# Patient Record
Sex: MALE | Race: WHITE | NOT HISPANIC OR LATINO | Employment: OTHER | URBAN - METROPOLITAN AREA
[De-identification: names, ages, dates, MRNs, and addresses within clinical notes are randomized per-mention and may not be internally consistent; named-entity substitution may affect disease eponyms.]

---

## 2023-05-05 LAB
ALANINE AMINOTRANSFERASE (SGPT) (U/L) IN SER/PLAS: 15 U/L (ref 10–52)
ALBUMIN (G/DL) IN SER/PLAS: 4.5 G/DL (ref 3.4–5)
ALKALINE PHOSPHATASE (U/L) IN SER/PLAS: 58 U/L (ref 33–136)
ANION GAP IN SER/PLAS: 12 MMOL/L (ref 10–20)
ASPARTATE AMINOTRANSFERASE (SGOT) (U/L) IN SER/PLAS: 15 U/L (ref 9–39)
BILIRUBIN TOTAL (MG/DL) IN SER/PLAS: 0.9 MG/DL (ref 0–1.2)
CALCIUM (MG/DL) IN SER/PLAS: 9.5 MG/DL (ref 8.6–10.3)
CARBON DIOXIDE, TOTAL (MMOL/L) IN SER/PLAS: 32 MMOL/L (ref 21–32)
CHLORIDE (MMOL/L) IN SER/PLAS: 102 MMOL/L (ref 98–107)
CREATININE (MG/DL) IN SER/PLAS: 0.96 MG/DL (ref 0.5–1.3)
GFR MALE: 84 ML/MIN/1.73M2
GLUCOSE (MG/DL) IN SER/PLAS: 153 MG/DL (ref 74–99)
POTASSIUM (MMOL/L) IN SER/PLAS: 3.9 MMOL/L (ref 3.5–5.3)
PROSTATE SPECIFIC AG (NG/ML) IN SER/PLAS: 0.35 NG/ML (ref 0–4)
PROTEIN TOTAL: 6.9 G/DL (ref 6.4–8.2)
SEDIMENTATION RATE, ERYTHROCYTE: 11 MM/H (ref 0–20)
SODIUM (MMOL/L) IN SER/PLAS: 142 MMOL/L (ref 136–145)
UREA NITROGEN (MG/DL) IN SER/PLAS: 13 MG/DL (ref 6–23)

## 2023-05-11 ENCOUNTER — OFFICE VISIT (OUTPATIENT)
Dept: PRIMARY CARE | Facility: CLINIC | Age: 73
End: 2023-05-11
Payer: MEDICARE

## 2023-05-11 VITALS
SYSTOLIC BLOOD PRESSURE: 126 MMHG | HEIGHT: 69 IN | HEART RATE: 65 BPM | BODY MASS INDEX: 22.96 KG/M2 | TEMPERATURE: 98.7 F | WEIGHT: 155 LBS | RESPIRATION RATE: 14 BRPM | OXYGEN SATURATION: 96 % | DIASTOLIC BLOOD PRESSURE: 60 MMHG

## 2023-05-11 DIAGNOSIS — I20.9 ANGINA PECTORIS (CMS-HCC): ICD-10-CM

## 2023-05-11 DIAGNOSIS — J44.9 CHRONIC OBSTRUCTIVE PULMONARY DISEASE, UNSPECIFIED COPD TYPE (MULTI): ICD-10-CM

## 2023-05-11 DIAGNOSIS — C61 PROSTATE CANCER (MULTI): Primary | ICD-10-CM

## 2023-05-11 DIAGNOSIS — R73.9 ELEVATED BLOOD SUGAR: ICD-10-CM

## 2023-05-11 DIAGNOSIS — I25.10 CORONARY ARTERY DISEASE INVOLVING NATIVE CORONARY ARTERY OF NATIVE HEART WITHOUT ANGINA PECTORIS: ICD-10-CM

## 2023-05-11 PROCEDURE — 1160F RVW MEDS BY RX/DR IN RCRD: CPT | Performed by: INTERNAL MEDICINE

## 2023-05-11 PROCEDURE — 1159F MED LIST DOCD IN RCRD: CPT | Performed by: INTERNAL MEDICINE

## 2023-05-11 PROCEDURE — 99213 OFFICE O/P EST LOW 20 MIN: CPT | Performed by: INTERNAL MEDICINE

## 2023-05-11 RX ORDER — ALBUTEROL SULFATE 90 UG/1
AEROSOL, METERED RESPIRATORY (INHALATION)
COMMUNITY
End: 2023-11-09 | Stop reason: SDUPTHER

## 2023-05-11 RX ORDER — RIVAROXABAN 20 MG/1
20 TABLET, FILM COATED ORAL DAILY
COMMUNITY
Start: 2023-05-08 | End: 2023-10-10 | Stop reason: ALTCHOICE

## 2023-05-11 RX ORDER — ROSUVASTATIN CALCIUM 40 MG/1
40 TABLET, COATED ORAL DAILY
COMMUNITY
End: 2023-05-11 | Stop reason: ALTCHOICE

## 2023-05-11 RX ORDER — BUPROPION HYDROCHLORIDE 150 MG/1
150 TABLET ORAL DAILY
COMMUNITY
Start: 2022-05-16 | End: 2023-05-11 | Stop reason: ALTCHOICE

## 2023-05-11 RX ORDER — BUDESONIDE 0.5 MG/2ML
0.5 INHALANT ORAL
COMMUNITY
Start: 2023-05-05 | End: 2024-05-04 | Stop reason: ENTERED-IN-ERROR

## 2023-05-11 RX ORDER — ASPIRIN 81 MG/1
1 TABLET ORAL DAILY
COMMUNITY
End: 2023-05-11 | Stop reason: ALTCHOICE

## 2023-05-11 RX ORDER — APIXABAN 5 MG/1
5 TABLET, FILM COATED ORAL 2 TIMES DAILY
COMMUNITY
Start: 2022-12-16 | End: 2023-05-11 | Stop reason: ALTCHOICE

## 2023-05-11 RX ORDER — NEBIVOLOL 2.5 MG/1
2.5 TABLET ORAL DAILY
COMMUNITY
Start: 2023-05-08 | End: 2023-11-09 | Stop reason: SDUPTHER

## 2023-05-11 RX ORDER — ACETAMINOPHEN 500 MG
TABLET ORAL
COMMUNITY
End: 2023-11-09 | Stop reason: ALTCHOICE

## 2023-05-11 RX ORDER — IPRATROPIUM BROMIDE AND ALBUTEROL SULFATE 2.5; .5 MG/3ML; MG/3ML
3 SOLUTION RESPIRATORY (INHALATION) 4 TIMES DAILY PRN
COMMUNITY
Start: 2023-05-05

## 2023-05-11 RX ORDER — TAMSULOSIN HYDROCHLORIDE 0.4 MG/1
1 CAPSULE ORAL DAILY
COMMUNITY
End: 2023-11-09 | Stop reason: SDUPTHER

## 2023-05-11 RX ORDER — TADALAFIL 5 MG/1
1 TABLET ORAL DAILY
COMMUNITY
Start: 2020-06-13 | End: 2023-05-11 | Stop reason: ALTCHOICE

## 2023-05-11 RX ORDER — PRAVASTATIN SODIUM 10 MG/1
10 TABLET ORAL DAILY
Qty: 90 TABLET | Refills: 3 | Status: SHIPPED | OUTPATIENT
Start: 2023-05-11 | End: 2023-11-09 | Stop reason: ALTCHOICE

## 2023-05-11 ASSESSMENT — ENCOUNTER SYMPTOMS
PALPITATIONS: 0
DIARRHEA: 0
WHEEZING: 1
SINUS PAIN: 0
SHORTNESS OF BREATH: 1
HYPERTENSION: 1
HEMATURIA: 1
COUGH: 1
CONSTIPATION: 0
ABDOMINAL PAIN: 0

## 2023-05-11 NOTE — PROGRESS NOTES
"Subjective   Patient ID: Raheel Cloud is a 72 y.o. male who presents for Hypertension (Follow up hypertension.).    Hypertension  Associated symptoms include shortness of breath. Pertinent negatives include no chest pain or palpitations.        Review of Systems   HENT:  Negative for postnasal drip and sinus pain.    Respiratory:  Positive for cough, shortness of breath and wheezing.    Cardiovascular:  Negative for chest pain and palpitations.   Gastrointestinal:  Negative for abdominal pain, constipation and diarrhea.   Genitourinary:  Positive for hematuria.        Prostate cancer       Objective   /60 (BP Location: Left arm, Patient Position: Standing, BP Cuff Size: Adult)   Pulse 65   Temp 37.1 °C (98.7 °F) (Tympanic)   Resp 14   Ht 1.753 m (5' 9\")   Wt 70.3 kg (155 lb)   SpO2 96%   BMI 22.89 kg/m²     Physical Exam  Vitals reviewed.   Constitutional:       Appearance: Normal appearance.   HENT:      Head: Normocephalic.   Cardiovascular:      Rate and Rhythm: Normal rate.   Pulmonary:      Effort: Pulmonary effort is normal.   Musculoskeletal:         General: Normal range of motion.   Neurological:      General: No focal deficit present.      Mental Status: He is alert.   Psychiatric:         Mood and Affect: Mood normal.         Assessment/Plan   Problem List Items Addressed This Visit          Circulatory    Angina pectoris (CMS/HCC)    Relevant Medications    nebivolol (Bystolic) 2.5 mg tablet       Genitourinary    Prostate cancer (CMS/HCC) - Primary     Other Visit Diagnoses       Chronic obstructive pulmonary disease, unspecified COPD type (CMS/HCC)        Elevated blood sugar        Relevant Orders    Basic Metabolic Panel    Hemoglobin A1C    Coronary artery disease involving native coronary artery of native heart without angina pectoris        Relevant Medications    nebivolol (Bystolic) 2.5 mg tablet    pravastatin (Pravachol) 10 mg tablet        Discussed all of the above.    He " states he had to stop the crestor due to muscle pains and weakness.  He is agreeable to trying pravastatin.    COPD has been progressive.  He continues to smoke.  He does follow with pulmonology.  Reviewed recent labs.  PSA trending down.    Follow up in 6 months - sooner if any issues.

## 2023-05-16 DIAGNOSIS — U07.1 COVID-19: Primary | ICD-10-CM

## 2023-05-20 DIAGNOSIS — J43.9 PULMONARY EMPHYSEMA, UNSPECIFIED EMPHYSEMA TYPE (MULTI): Primary | ICD-10-CM

## 2023-05-20 RX ORDER — PREDNISONE 10 MG/1
TABLET ORAL
Qty: 30 TABLET | Refills: 0 | Status: SHIPPED | OUTPATIENT
Start: 2023-05-20 | End: 2023-05-30

## 2023-05-22 ENCOUNTER — PATIENT OUTREACH (OUTPATIENT)
Dept: PRIMARY CARE | Facility: CLINIC | Age: 73
End: 2023-05-22
Payer: MEDICARE

## 2023-05-22 NOTE — PROGRESS NOTES
Discharge Facility: [facility]  Discharge Diagnosis: Hypoxia, COPD exacerbation, COVID  Admission Date:5/20/2023  Discharge Date: 5/21/2023    PCP Appointment Date:6/1/2023  Specialist Appointment Date: NONE  Hospital Encounter and Summary: Linked   See discharge assessment below for further details    Engagement  Call Start Time: 1148 (5/22/2023 11:48 AM)    Medications  Medications reviewed with patient/caregiver?: Yes (5/22/2023 11:48 AM)  Is the patient having any side effects they believe may be caused by any medication additions or changes?: No (5/22/2023 11:48 AM)  Does the patient have all medications ordered at discharge?: Yes (new meds: prednisone) (5/22/2023 11:48 AM)  Care Management Interventions: No intervention needed (5/22/2023 11:48 AM)  Is the patient taking all medications as directed (includes completed medication regime)?: Yes (5/22/2023 11:48 AM)  Medication Comments: see med list (5/22/2023 11:48 AM)    Appointments  Does the patient have a primary care provider?: Yes (5/22/2023 11:48 AM)  Care Management Interventions: Verified appointment date/time/provider (5/22/2023 11:48 AM)  Has the patient kept scheduled appointments due by today?: Yes (5/22/2023 11:48 AM)  Care Management Interventions: Advised patient to keep appointment (5/22/2023 11:48 AM)    Self Management  What is the home health agency?: none ordered (5/22/2023 11:48 AM)  Has home health visited the patient within 72 hours of discharge?: Not applicable (5/22/2023 11:48 AM)    Patient Teaching  Does the patient have access to their discharge instructions?: Yes (5/22/2023 11:48 AM)  Care Management Interventions: Reviewed instructions with patient (5/22/2023 11:48 AM)  What is the patient's perception of their health status since discharge?: Same (5/22/2023 11:48 AM)  Is the patient/caregiver able to teach back the hierarchy of who to call/visit for symptoms/problems? PCP, Specialist, Home Health nurse, Urgent Care, ED, 911: Yes  (5/22/2023 11:48 AM)    Wrap Up  Wrap Up Additional Comments: spoke with patient. he stated that he is doing about the same. he is checking his oxygen and is staying around 88-89%. has o2 at home. using as needed. 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 has follow up appt with pcp. (5/22/2023 11:48 AM)  Call End Time: 1151 (5/22/2023 11:48 AM)

## 2023-05-31 ENCOUNTER — TELEPHONE (OUTPATIENT)
Dept: PRIMARY CARE | Facility: CLINIC | Age: 73
End: 2023-05-31
Payer: MEDICARE

## 2023-06-01 ENCOUNTER — APPOINTMENT (OUTPATIENT)
Dept: PRIMARY CARE | Facility: CLINIC | Age: 73
End: 2023-06-01
Payer: MEDICARE

## 2023-06-01 PROBLEM — K42.9 UMBILICAL HERNIA WITHOUT OBSTRUCTION AND WITHOUT GANGRENE: Status: ACTIVE | Noted: 2023-06-01

## 2023-06-01 PROBLEM — R09.02 HYPOXIA: Status: ACTIVE | Noted: 2023-06-01

## 2023-06-01 PROBLEM — I49.3 FREQUENT PVCS: Status: ACTIVE | Noted: 2023-06-01

## 2023-06-01 PROBLEM — F17.200 SMOKER: Status: ACTIVE | Noted: 2023-06-01

## 2023-06-01 PROBLEM — N40.0 BPH (BENIGN PROSTATIC HYPERPLASIA): Status: ACTIVE | Noted: 2023-06-01

## 2023-06-01 PROBLEM — I49.1 PAC (PREMATURE ATRIAL CONTRACTION): Status: ACTIVE | Noted: 2023-06-01

## 2023-06-01 PROBLEM — E78.5 HLD (HYPERLIPIDEMIA): Status: ACTIVE | Noted: 2023-06-01

## 2023-06-06 ENCOUNTER — PATIENT OUTREACH (OUTPATIENT)
Dept: PRIMARY CARE | Facility: CLINIC | Age: 73
End: 2023-06-06
Payer: MEDICARE

## 2023-06-06 NOTE — PROGRESS NOTES
Unable to reach patient for call back after patient's scheduled follow up appointment with PCP. Patients appt was cancelled.   LVM with call back number for patient to call if needed   If no voicemail available call attempts x 2 were made to contact the patient to assist with any questions or concerns patient may have.

## 2023-07-06 ENCOUNTER — PATIENT OUTREACH (OUTPATIENT)
Dept: PRIMARY CARE | Facility: CLINIC | Age: 73
End: 2023-07-06
Payer: MEDICARE

## 2023-08-11 LAB — PROSTATE SPECIFIC AG (NG/ML) IN SER/PLAS: 0.43 NG/ML (ref 0–4)

## 2023-08-21 ENCOUNTER — PATIENT OUTREACH (OUTPATIENT)
Dept: PRIMARY CARE | Facility: CLINIC | Age: 73
End: 2023-08-21
Payer: MEDICARE

## 2023-09-29 ENCOUNTER — TRANSCRIBE ORDERS (OUTPATIENT)
Dept: CARDIAC REHAB | Facility: CLINIC | Age: 73
End: 2023-09-29
Payer: MEDICARE

## 2023-09-29 DIAGNOSIS — J44.9 CHRONIC OBSTRUCTIVE PULMONARY DISEASE, UNSPECIFIED COPD TYPE (MULTI): Primary | ICD-10-CM

## 2023-10-02 ENCOUNTER — CLINICAL SUPPORT (OUTPATIENT)
Dept: CARDIAC REHAB | Facility: CLINIC | Age: 73
End: 2023-10-02
Payer: MEDICARE

## 2023-10-02 DIAGNOSIS — J44.9 CHRONIC OBSTRUCTIVE PULMONARY DISEASE, UNSPECIFIED COPD TYPE (MULTI): ICD-10-CM

## 2023-10-02 PROCEDURE — 94625 PHY/QHP OP PULM RHB W/O MNTR: CPT | Performed by: INTERNAL MEDICINE

## 2023-10-04 ENCOUNTER — CLINICAL SUPPORT (OUTPATIENT)
Dept: CARDIAC REHAB | Facility: CLINIC | Age: 73
End: 2023-10-04
Payer: MEDICARE

## 2023-10-04 ENCOUNTER — APPOINTMENT (OUTPATIENT)
Dept: CARDIAC REHAB | Facility: CLINIC | Age: 73
End: 2023-10-04
Payer: MEDICARE

## 2023-10-04 ENCOUNTER — TRANSCRIBE ORDERS (OUTPATIENT)
Dept: CARDIAC REHAB | Facility: CLINIC | Age: 73
End: 2023-10-04
Payer: MEDICARE

## 2023-10-04 DIAGNOSIS — J44.9 CHRONIC OBSTRUCTIVE PULMONARY DISEASE, UNSPECIFIED COPD TYPE (MULTI): Primary | ICD-10-CM

## 2023-10-04 DIAGNOSIS — J44.9 CHRONIC OBSTRUCTIVE PULMONARY DISEASE, UNSPECIFIED COPD TYPE (MULTI): ICD-10-CM

## 2023-10-04 PROCEDURE — 94625 PHY/QHP OP PULM RHB W/O MNTR: CPT | Performed by: INTERNAL MEDICINE

## 2023-10-06 ENCOUNTER — APPOINTMENT (OUTPATIENT)
Dept: CARDIAC REHAB | Facility: CLINIC | Age: 73
End: 2023-10-06
Payer: MEDICARE

## 2023-10-06 ENCOUNTER — CLINICAL SUPPORT (OUTPATIENT)
Dept: CARDIAC REHAB | Facility: CLINIC | Age: 73
End: 2023-10-06
Payer: MEDICARE

## 2023-10-06 DIAGNOSIS — J44.9 CHRONIC OBSTRUCTIVE PULMONARY DISEASE, UNSPECIFIED COPD TYPE (MULTI): ICD-10-CM

## 2023-10-06 PROCEDURE — 94625 PHY/QHP OP PULM RHB W/O MNTR: CPT | Performed by: INTERNAL MEDICINE

## 2023-10-06 NOTE — PROGRESS NOTES
Cardiac Rehabilitation Phase II Physical Assessment    Raheel Cloud   [id number]   1950     AMB DESC CHEST PAIN: none,

## 2023-10-09 ENCOUNTER — TELEPHONE (OUTPATIENT)
Dept: PRIMARY CARE | Facility: CLINIC | Age: 73
End: 2023-10-09
Payer: MEDICARE

## 2023-10-09 ENCOUNTER — CLINICAL SUPPORT (OUTPATIENT)
Dept: CARDIAC REHAB | Facility: CLINIC | Age: 73
End: 2023-10-09
Payer: MEDICARE

## 2023-10-09 ENCOUNTER — APPOINTMENT (OUTPATIENT)
Dept: CARDIAC REHAB | Facility: CLINIC | Age: 73
End: 2023-10-09
Payer: MEDICARE

## 2023-10-09 DIAGNOSIS — J44.9 CHRONIC OBSTRUCTIVE PULMONARY DISEASE, UNSPECIFIED COPD TYPE (MULTI): ICD-10-CM

## 2023-10-09 PROCEDURE — 94625 PHY/QHP OP PULM RHB W/O MNTR: CPT | Performed by: INTERNAL MEDICINE

## 2023-10-10 ENCOUNTER — OFFICE VISIT (OUTPATIENT)
Dept: CARDIOLOGY | Facility: CLINIC | Age: 73
End: 2023-10-10
Payer: MEDICARE

## 2023-10-10 VITALS
HEART RATE: 75 BPM | BODY MASS INDEX: 23.85 KG/M2 | SYSTOLIC BLOOD PRESSURE: 120 MMHG | OXYGEN SATURATION: 95 % | DIASTOLIC BLOOD PRESSURE: 68 MMHG | WEIGHT: 161 LBS | HEIGHT: 69 IN

## 2023-10-10 DIAGNOSIS — I48.91 ATRIAL FIBRILLATION, UNSPECIFIED TYPE (MULTI): Primary | ICD-10-CM

## 2023-10-10 DIAGNOSIS — E78.5 HYPERLIPIDEMIA, UNSPECIFIED HYPERLIPIDEMIA TYPE: ICD-10-CM

## 2023-10-10 DIAGNOSIS — I48.0 PAROXYSMAL ATRIAL FIBRILLATION (MULTI): ICD-10-CM

## 2023-10-10 PROCEDURE — 1160F RVW MEDS BY RX/DR IN RCRD: CPT | Performed by: INTERNAL MEDICINE

## 2023-10-10 PROCEDURE — 99214 OFFICE O/P EST MOD 30 MIN: CPT | Performed by: INTERNAL MEDICINE

## 2023-10-10 PROCEDURE — 1126F AMNT PAIN NOTED NONE PRSNT: CPT | Performed by: INTERNAL MEDICINE

## 2023-10-10 PROCEDURE — 1159F MED LIST DOCD IN RCRD: CPT | Performed by: INTERNAL MEDICINE

## 2023-10-10 ASSESSMENT — ENCOUNTER SYMPTOMS
OCCASIONAL FEELINGS OF UNSTEADINESS: 0
DEPRESSION: 0
LOSS OF SENSATION IN FEET: 0

## 2023-10-10 ASSESSMENT — PAIN SCALES - GENERAL: PAINLEVEL: 0-NO PAIN

## 2023-10-10 NOTE — PROGRESS NOTES
Chief Complaint:   No chief complaint on file.     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    Patient had previously been seen by Dr. Valladares.    He had an episode of atrial fibrillation with rapid ventricular response.  Came to the hospital and converted shortly thereafter.  Was started on anticoagulation and has remained on it ever since.    Does have arthritic pains and used to use Aleve intermittently however has not been doing this because of his Xarelto use.    Denies any palpitations, chest pain or lightheadedness.  Does have shortness of breath which is likely combination of his COPD and recovering from COVID still.  Is on home oxygen but is hoping to get off of it soon.    Echocardiogram 1/10/2023: EF 60 to 65%.  Trace MR and trace to mild TR.    Treadmill stress echocardiogram 10/4/2022: Exercised for 2 minutes and 10 seconds on a standard Jose protocol achieving 95% of the age-predicted maximal heart rate.  EF 60% at baseline increasing to 80% at peak stress.     Past Medical History:  He has a past medical history of Personal history of other diseases of the respiratory system (06/23/2021).    Past Surgical History:  He has a past surgical history that includes Other surgical history (09/24/2020) and Other surgical history (09/24/2020).      Social History:  He reports that he has been smoking cigarettes. He has been smoking an average of 2 packs per day. He has never used smokeless tobacco. He reports that he does not currently use alcohol. He reports that he does not currently use drugs.    Family History:  No family history on file.     Allergies:  Patient has no known allergies.    Outpatient Medications:  Current Outpatient Medications   Medication Instructions    budesonide (PULMICORT) 0.5 mg,  "inhalation, Daily RT    cholecalciferol (Vitamin D-3) 50 mcg (2,000 unit) capsule oral    Flomax 0.4 mg 24 hr capsule 1 capsule, oral, Daily    ipratropium-albuteroL (Duo-Neb) 0.5-2.5 mg/3 mL nebulizer solution 3 mL, inhalation, 3 times daily    nebivolol (BYSTOLIC) 2.5 mg, oral, Daily    pravastatin (PRAVACHOL) 10 mg, oral, Daily    Ventolin HFA 90 mcg/actuation inhaler inhale 2 puffs by mouth and INTO THE LUNGS every 4 hours if neede...  (REFER TO PRESCRIPTION NOTES).    Xarelto 20 mg, oral, Daily       Last Recorded Vitals:  Visit Vitals  /68 (BP Location: Left arm, Patient Position: Sitting)   Pulse 75   Ht 1.753 m (5' 9\")   Wt 73 kg (161 lb)   SpO2 95%   BMI 23.78 kg/m²   Smoking Status Every Day   BSA 1.89 m²        Physical Exam:  In general: alert and in no acute distress.   HEENT: Mucous membranes moist, carotid upstrokes normal with no bruits. JVP is normal. No cervical lymphadenopathy. Cranial nerves II-XII grossly intact.  Pulmonary: Clear to auscultation bilaterally.  Cardiovascular: S1,S2, regular. No appreciable murmurs, rubs or gallops.   Lower extremities: Warm. 2+ distal pulses.  Trivial left lower extremity edema.  Skin: warm and dry. No obvious rashes visualized.  Psychiatric: Oriented to person, place and time. No obvious agitation.       Last Labs:  CBC -  Lab Results   Component Value Date    WBC 7.3 05/21/2023    HGB 12.7 (L) 05/21/2023    HCT 40.3 (L) 05/21/2023    MCV 90 05/21/2023     05/21/2023       CMP -  Lab Results   Component Value Date    CALCIUM 9.1 05/21/2023    PROT 7.1 05/21/2023    ALBUMIN 4.3 05/21/2023    AST 16 05/21/2023    ALT 13 05/21/2023    ALKPHOS 54 05/21/2023    BILITOT 0.6 05/21/2023       LIPID PANEL -   Lab Results   Component Value Date    CHOL 128 11/17/2022    TRIG 88 11/17/2022    HDL 46.0 11/17/2022    CHHDL 2.8 11/17/2022    LDLF 64 11/17/2022    VLDL 18 11/17/2022       RENAL FUNCTION PANEL -   Lab Results   Component Value Date    GLUCOSE 138 " (H) 05/21/2023     05/21/2023    K 4.2 05/21/2023     05/21/2023    CO2 26 05/21/2023    ANIONGAP 12 05/21/2023    BUN 15 05/21/2023    CREATININE 0.88 05/21/2023    GFRMALE >90 05/21/2023    CALCIUM 9.1 05/21/2023    ALBUMIN 4.3 05/21/2023        Lab Results   Component Value Date    BNP 50 01/03/2023         Assessment/Plan   1) atrial fibrillation: No symptomatic recurrences.  Would remain on rate control with nebivolol and anticoagulation with Xarelto.  We talked about the J-Rocket Trial in which the Japanese used a lower dose of Xarelto which showed similar stroke reduction risk.  Have changed Xarelto to 15 mg daily.    2) dyslipidemia: Continue statin therapy    3) arthritic pains: Told him that if he would really like to use Aleve intermittently that I would recommend using Pepcid routinely and then using Aleve only as needed.    For) follow-up: 6 months or sooner if needed      Paul Ibrahim MD

## 2023-10-11 ENCOUNTER — APPOINTMENT (OUTPATIENT)
Dept: CARDIAC REHAB | Facility: CLINIC | Age: 73
End: 2023-10-11
Payer: MEDICARE

## 2023-10-11 ENCOUNTER — CLINICAL SUPPORT (OUTPATIENT)
Dept: CARDIAC REHAB | Facility: CLINIC | Age: 73
End: 2023-10-11
Payer: MEDICARE

## 2023-10-11 DIAGNOSIS — J44.9 CHRONIC OBSTRUCTIVE PULMONARY DISEASE, UNSPECIFIED COPD TYPE (MULTI): ICD-10-CM

## 2023-10-11 PROCEDURE — 94625 PHY/QHP OP PULM RHB W/O MNTR: CPT | Performed by: INTERNAL MEDICINE

## 2023-10-13 ENCOUNTER — CLINICAL SUPPORT (OUTPATIENT)
Dept: CARDIAC REHAB | Facility: CLINIC | Age: 73
End: 2023-10-13
Payer: MEDICARE

## 2023-10-13 ENCOUNTER — APPOINTMENT (OUTPATIENT)
Dept: CARDIAC REHAB | Facility: CLINIC | Age: 73
End: 2023-10-13
Payer: MEDICARE

## 2023-10-13 DIAGNOSIS — J44.9 CHRONIC OBSTRUCTIVE PULMONARY DISEASE, UNSPECIFIED COPD TYPE (MULTI): ICD-10-CM

## 2023-10-13 PROCEDURE — 94625 PHY/QHP OP PULM RHB W/O MNTR: CPT | Performed by: INTERNAL MEDICINE

## 2023-10-16 ENCOUNTER — CLINICAL SUPPORT (OUTPATIENT)
Dept: CARDIAC REHAB | Facility: CLINIC | Age: 73
End: 2023-10-16
Payer: MEDICARE

## 2023-10-16 ENCOUNTER — APPOINTMENT (OUTPATIENT)
Dept: CARDIAC REHAB | Facility: CLINIC | Age: 73
End: 2023-10-16
Payer: MEDICARE

## 2023-10-16 DIAGNOSIS — J44.9 CHRONIC OBSTRUCTIVE PULMONARY DISEASE, UNSPECIFIED COPD TYPE (MULTI): ICD-10-CM

## 2023-10-16 PROCEDURE — 94625 PHY/QHP OP PULM RHB W/O MNTR: CPT | Performed by: INTERNAL MEDICINE

## 2023-10-18 ENCOUNTER — APPOINTMENT (OUTPATIENT)
Dept: CARDIAC REHAB | Facility: CLINIC | Age: 73
End: 2023-10-18
Payer: MEDICARE

## 2023-10-18 ENCOUNTER — CLINICAL SUPPORT (OUTPATIENT)
Dept: CARDIAC REHAB | Facility: CLINIC | Age: 73
End: 2023-10-18
Payer: MEDICARE

## 2023-10-18 DIAGNOSIS — J44.9 CHRONIC OBSTRUCTIVE PULMONARY DISEASE, UNSPECIFIED COPD TYPE (MULTI): ICD-10-CM

## 2023-10-18 PROCEDURE — 94625 PHY/QHP OP PULM RHB W/O MNTR: CPT | Performed by: INTERNAL MEDICINE

## 2023-10-20 ENCOUNTER — CLINICAL SUPPORT (OUTPATIENT)
Dept: CARDIAC REHAB | Facility: CLINIC | Age: 73
End: 2023-10-20
Payer: MEDICARE

## 2023-10-20 ENCOUNTER — APPOINTMENT (OUTPATIENT)
Dept: CARDIAC REHAB | Facility: CLINIC | Age: 73
End: 2023-10-20
Payer: MEDICARE

## 2023-10-20 DIAGNOSIS — J44.9 CHRONIC OBSTRUCTIVE PULMONARY DISEASE, UNSPECIFIED COPD TYPE (MULTI): ICD-10-CM

## 2023-10-20 PROCEDURE — 94625 PHY/QHP OP PULM RHB W/O MNTR: CPT | Performed by: INTERNAL MEDICINE

## 2023-10-23 ENCOUNTER — APPOINTMENT (OUTPATIENT)
Dept: CARDIAC REHAB | Facility: CLINIC | Age: 73
End: 2023-10-23
Payer: MEDICARE

## 2023-10-23 ENCOUNTER — CLINICAL SUPPORT (OUTPATIENT)
Dept: CARDIAC REHAB | Facility: CLINIC | Age: 73
End: 2023-10-23
Payer: MEDICARE

## 2023-10-23 DIAGNOSIS — J44.9 CHRONIC OBSTRUCTIVE PULMONARY DISEASE, UNSPECIFIED COPD TYPE (MULTI): ICD-10-CM

## 2023-10-23 PROCEDURE — 94625 PHY/QHP OP PULM RHB W/O MNTR: CPT | Performed by: INTERNAL MEDICINE

## 2023-10-25 ENCOUNTER — CLINICAL SUPPORT (OUTPATIENT)
Dept: CARDIAC REHAB | Facility: CLINIC | Age: 73
End: 2023-10-25
Payer: MEDICARE

## 2023-10-25 ENCOUNTER — APPOINTMENT (OUTPATIENT)
Dept: CARDIAC REHAB | Facility: CLINIC | Age: 73
End: 2023-10-25
Payer: MEDICARE

## 2023-10-25 DIAGNOSIS — J44.9 CHRONIC OBSTRUCTIVE PULMONARY DISEASE, UNSPECIFIED COPD TYPE (MULTI): ICD-10-CM

## 2023-10-25 PROCEDURE — 94625 PHY/QHP OP PULM RHB W/O MNTR: CPT | Performed by: INTERNAL MEDICINE

## 2023-10-27 ENCOUNTER — CLINICAL SUPPORT (OUTPATIENT)
Dept: CARDIAC REHAB | Facility: CLINIC | Age: 73
End: 2023-10-27
Payer: MEDICARE

## 2023-10-27 ENCOUNTER — APPOINTMENT (OUTPATIENT)
Dept: CARDIAC REHAB | Facility: CLINIC | Age: 73
End: 2023-10-27
Payer: MEDICARE

## 2023-10-27 DIAGNOSIS — J44.9 CHRONIC OBSTRUCTIVE PULMONARY DISEASE, UNSPECIFIED COPD TYPE (MULTI): ICD-10-CM

## 2023-10-27 PROCEDURE — 94625 PHY/QHP OP PULM RHB W/O MNTR: CPT | Performed by: INTERNAL MEDICINE

## 2023-10-30 ENCOUNTER — APPOINTMENT (OUTPATIENT)
Dept: CARDIAC REHAB | Facility: CLINIC | Age: 73
End: 2023-10-30
Payer: MEDICARE

## 2023-10-30 ENCOUNTER — CLINICAL SUPPORT (OUTPATIENT)
Dept: CARDIAC REHAB | Facility: CLINIC | Age: 73
End: 2023-10-30
Payer: MEDICARE

## 2023-10-30 DIAGNOSIS — J44.9 CHRONIC OBSTRUCTIVE PULMONARY DISEASE, UNSPECIFIED COPD TYPE (MULTI): ICD-10-CM

## 2023-10-30 PROCEDURE — 94625 PHY/QHP OP PULM RHB W/O MNTR: CPT | Performed by: INTERNAL MEDICINE

## 2023-11-01 ENCOUNTER — APPOINTMENT (OUTPATIENT)
Dept: CARDIAC REHAB | Facility: CLINIC | Age: 73
End: 2023-11-01
Payer: MEDICARE

## 2023-11-01 ENCOUNTER — CLINICAL SUPPORT (OUTPATIENT)
Dept: CARDIAC REHAB | Facility: CLINIC | Age: 73
End: 2023-11-01
Payer: MEDICARE

## 2023-11-01 DIAGNOSIS — J44.9 CHRONIC OBSTRUCTIVE PULMONARY DISEASE, UNSPECIFIED COPD TYPE (MULTI): ICD-10-CM

## 2023-11-01 PROCEDURE — 94625 PHY/QHP OP PULM RHB W/O MNTR: CPT

## 2023-11-02 NOTE — PROGRESS NOTES
FOLLOW UP PICTURE YOUR PLATE DIET ASSESSMENT  PULMONARY REHAB    SCORES:  Vegetables & Fruit (out of 12)                 8   Breads, Grains & Cereals (out of 12)        5  Red & Processed Meat (out of 12)         9  Poultry (out of 2)                                   2  Fish & Shellfish (out of 4)                      0  Beans, Nuts & Seeds (out of 4)             1  Milk & Dairy Foods (out of 6)              5  Toppings, Oils, Seasonings & Salt (out of 20)    14  Sweets, Snacks & Restaurant Food (out of 14)    10  Beverages (out of 10)          7     Overall Score (out of 96)    61  Pre vs Post Overall Score Change:  INCREASE/DECREASE: increase    GOALS  Aim to consume at least 5 fruits and vegetables/d.   Aim to limit intake of sugar sweetened beverages to <8oz/d.    GOALS MET:  YES /NO: Yes  YES /NO: No

## 2023-11-03 ENCOUNTER — CLINICAL SUPPORT (OUTPATIENT)
Dept: CARDIAC REHAB | Facility: CLINIC | Age: 73
End: 2023-11-03
Payer: MEDICARE

## 2023-11-03 ENCOUNTER — APPOINTMENT (OUTPATIENT)
Dept: CARDIAC REHAB | Facility: CLINIC | Age: 73
End: 2023-11-03
Payer: MEDICARE

## 2023-11-03 DIAGNOSIS — J44.9 CHRONIC OBSTRUCTIVE PULMONARY DISEASE, UNSPECIFIED COPD TYPE (MULTI): ICD-10-CM

## 2023-11-03 PROCEDURE — 94625 PHY/QHP OP PULM RHB W/O MNTR: CPT | Performed by: INTERNAL MEDICINE

## 2023-11-06 ENCOUNTER — APPOINTMENT (OUTPATIENT)
Dept: CARDIAC REHAB | Facility: CLINIC | Age: 73
End: 2023-11-06
Payer: MEDICARE

## 2023-11-06 ENCOUNTER — CLINICAL SUPPORT (OUTPATIENT)
Dept: CARDIAC REHAB | Facility: CLINIC | Age: 73
End: 2023-11-06
Payer: MEDICARE

## 2023-11-06 DIAGNOSIS — J44.9 CHRONIC OBSTRUCTIVE PULMONARY DISEASE, UNSPECIFIED COPD TYPE (MULTI): ICD-10-CM

## 2023-11-06 PROCEDURE — 94625 PHY/QHP OP PULM RHB W/O MNTR: CPT | Performed by: INTERNAL MEDICINE

## 2023-11-08 ENCOUNTER — APPOINTMENT (OUTPATIENT)
Dept: CARDIAC REHAB | Facility: CLINIC | Age: 73
End: 2023-11-08
Payer: MEDICARE

## 2023-11-08 ENCOUNTER — CLINICAL SUPPORT (OUTPATIENT)
Dept: CARDIAC REHAB | Facility: CLINIC | Age: 73
End: 2023-11-08
Payer: MEDICARE

## 2023-11-08 DIAGNOSIS — J44.9 CHRONIC OBSTRUCTIVE PULMONARY DISEASE, UNSPECIFIED COPD TYPE (MULTI): ICD-10-CM

## 2023-11-08 PROCEDURE — 94625 PHY/QHP OP PULM RHB W/O MNTR: CPT | Performed by: INTERNAL MEDICINE

## 2023-11-09 ENCOUNTER — OFFICE VISIT (OUTPATIENT)
Dept: PRIMARY CARE | Facility: CLINIC | Age: 73
End: 2023-11-09
Payer: MEDICARE

## 2023-11-09 VITALS
RESPIRATION RATE: 16 BRPM | HEART RATE: 65 BPM | BODY MASS INDEX: 23.55 KG/M2 | HEIGHT: 69 IN | TEMPERATURE: 97.6 F | OXYGEN SATURATION: 97 % | SYSTOLIC BLOOD PRESSURE: 130 MMHG | WEIGHT: 159 LBS | DIASTOLIC BLOOD PRESSURE: 80 MMHG

## 2023-11-09 DIAGNOSIS — E78.00 PURE HYPERCHOLESTEROLEMIA: ICD-10-CM

## 2023-11-09 DIAGNOSIS — R35.0 BENIGN PROSTATIC HYPERPLASIA WITH URINARY FREQUENCY: ICD-10-CM

## 2023-11-09 DIAGNOSIS — J44.9 CHRONIC OBSTRUCTIVE PULMONARY DISEASE, UNSPECIFIED COPD TYPE (MULTI): ICD-10-CM

## 2023-11-09 DIAGNOSIS — Z00.00 ROUTINE GENERAL MEDICAL EXAMINATION AT HEALTH CARE FACILITY: ICD-10-CM

## 2023-11-09 DIAGNOSIS — N40.1 BENIGN PROSTATIC HYPERPLASIA WITH URINARY FREQUENCY: ICD-10-CM

## 2023-11-09 DIAGNOSIS — D64.9 ANEMIA, UNSPECIFIED TYPE: ICD-10-CM

## 2023-11-09 DIAGNOSIS — R73.9 ELEVATED BLOOD SUGAR: ICD-10-CM

## 2023-11-09 DIAGNOSIS — I48.0 PAROXYSMAL ATRIAL FIBRILLATION (MULTI): Primary | ICD-10-CM

## 2023-11-09 DIAGNOSIS — Z12.5 SCREENING FOR PROSTATE CANCER: ICD-10-CM

## 2023-11-09 PROCEDURE — 1159F MED LIST DOCD IN RCRD: CPT | Performed by: INTERNAL MEDICINE

## 2023-11-09 PROCEDURE — G0439 PPPS, SUBSEQ VISIT: HCPCS | Performed by: INTERNAL MEDICINE

## 2023-11-09 PROCEDURE — 1126F AMNT PAIN NOTED NONE PRSNT: CPT | Performed by: INTERNAL MEDICINE

## 2023-11-09 PROCEDURE — 1160F RVW MEDS BY RX/DR IN RCRD: CPT | Performed by: INTERNAL MEDICINE

## 2023-11-09 PROCEDURE — 1170F FXNL STATUS ASSESSED: CPT | Performed by: INTERNAL MEDICINE

## 2023-11-09 PROCEDURE — 99214 OFFICE O/P EST MOD 30 MIN: CPT | Performed by: INTERNAL MEDICINE

## 2023-11-09 RX ORDER — ROFLUMILAST 250 UG/1
500 TABLET ORAL DAILY
Qty: 180 TABLET | Refills: 3 | Status: SHIPPED | OUTPATIENT
Start: 2023-11-09 | End: 2024-05-20 | Stop reason: ALTCHOICE

## 2023-11-09 RX ORDER — ALBUTEROL SULFATE 90 UG/1
2 AEROSOL, METERED RESPIRATORY (INHALATION) EVERY 4 HOURS PRN
Qty: 18 G | Refills: 3 | Status: SHIPPED | OUTPATIENT
Start: 2023-11-09 | End: 2024-01-18 | Stop reason: SDUPTHER

## 2023-11-09 RX ORDER — NEBIVOLOL 2.5 MG/1
2.5 TABLET ORAL DAILY
Qty: 90 TABLET | Refills: 3 | Status: SHIPPED | OUTPATIENT
Start: 2023-11-09 | End: 2024-01-30 | Stop reason: SDUPTHER

## 2023-11-09 RX ORDER — TAMSULOSIN HYDROCHLORIDE 0.4 MG/1
0.4 CAPSULE ORAL DAILY
Qty: 90 CAPSULE | Refills: 3 | Status: SHIPPED | OUTPATIENT
Start: 2023-11-09 | End: 2024-01-30 | Stop reason: SDUPTHER

## 2023-11-09 ASSESSMENT — ENCOUNTER SYMPTOMS
ABDOMINAL PAIN: 0
COUGH: 0
PALPITATIONS: 0
WHEEZING: 0
SHORTNESS OF BREATH: 0

## 2023-11-09 ASSESSMENT — ACTIVITIES OF DAILY LIVING (ADL)
GROCERY_SHOPPING: INDEPENDENT
DOING_HOUSEWORK: INDEPENDENT
MANAGING_FINANCES: INDEPENDENT
BATHING: INDEPENDENT
DRESSING: INDEPENDENT
TAKING_MEDICATION: INDEPENDENT

## 2023-11-09 ASSESSMENT — PATIENT HEALTH QUESTIONNAIRE - PHQ9
2. FEELING DOWN, DEPRESSED OR HOPELESS: NOT AT ALL
SUM OF ALL RESPONSES TO PHQ9 QUESTIONS 1 AND 2: 0
1. LITTLE INTEREST OR PLEASURE IN DOING THINGS: NOT AT ALL

## 2023-11-09 NOTE — PROGRESS NOTES
"Subjective   Patient ID: Raheel Cloud is a 73 y.o. male who presents for medical follow up and Medicare Annual Wellness Visit Subsequent.    Feeling well.  He has completed pulmonary rehab and is feeling really well.  No issues with CP, palpitations and SOB is stable.    We reviewed recent test results and current medications.      Review of Systems   Respiratory:  Negative for cough, shortness of breath and wheezing.    Cardiovascular:  Negative for chest pain and palpitations.   Gastrointestinal:  Negative for abdominal pain.       Objective   /80 (BP Location: Left arm, Patient Position: Sitting, BP Cuff Size: Adult)   Pulse 65   Temp 36.4 °C (97.6 °F) (Tympanic)   Resp 16   Ht 1.753 m (5' 9\")   Wt 72.1 kg (159 lb)   SpO2 97% Comment: 2 lpm  BMI 23.48 kg/m²     Physical Exam  Vitals reviewed.   Constitutional:       Appearance: Normal appearance.   HENT:      Head: Normocephalic.   Cardiovascular:      Rate and Rhythm: Normal rate and regular rhythm.   Pulmonary:      Effort: Pulmonary effort is normal.      Breath sounds: Normal breath sounds.   Musculoskeletal:         General: Normal range of motion.   Neurological:      General: No focal deficit present.      Mental Status: He is alert.   Psychiatric:         Mood and Affect: Mood normal.         Assessment/Plan   Problem List Items Addressed This Visit             ICD-10-CM    BPH (benign prostatic hyperplasia) N40.0    Relevant Medications    Flomax 0.4 mg 24 hr capsule    HLD (hyperlipidemia) E78.5    Relevant Orders    Lipid Panel    Comprehensive Metabolic Panel    Chronic obstructive pulmonary disease (CMS/HCC) J44.9    Relevant Medications    Ventolin HFA 90 mcg/actuation inhaler    roflumilast (Daliresp) 250 mcg tablet    Paroxysmal atrial fibrillation (CMS/HCC) - Primary I48.0    Relevant Medications    nebivolol (Bystolic) 2.5 mg tablet    Other Relevant Orders    Thyroid Stimulating Hormone     Other Visit Diagnoses         Codes    " Anemia, unspecified type     D64.9    Relevant Orders    CBC    Elevated blood sugar     R73.9    Relevant Orders    Hemoglobin A1C    Screening for prostate cancer     Z12.5    Relevant Orders    Prostate Specific Antigen    Routine general medical examination at health care facility     Z00.00        We reviewed and discussed all of the above.    We reviewed labs and previous scans including PET scan.   Previous elevated blood sugar, but likely due to prednisone.  We will continue to monitor.    COPD stable.    HTN stable.  A. Fib is clinically stable.  We discussed risk vs benefit of anticoagulation.    We will continue to monitor is ongoing medical issues.  Stressed the need to continue to work on own pulmonary rehab with exercise and physical activity.    Annual Wellness Visit questions and answers were reviewed and discussed including the importance of discussing end of life wishes as well as having a living will and health care power of .

## 2024-01-18 DIAGNOSIS — J44.9 CHRONIC OBSTRUCTIVE PULMONARY DISEASE, UNSPECIFIED COPD TYPE (MULTI): ICD-10-CM

## 2024-01-18 RX ORDER — ALBUTEROL SULFATE 90 UG/1
2 AEROSOL, METERED RESPIRATORY (INHALATION) EVERY 4 HOURS PRN
Qty: 18 G | Refills: 3 | Status: SHIPPED | OUTPATIENT
Start: 2024-01-18 | End: 2025-01-17

## 2024-01-30 DIAGNOSIS — I48.0 PAROXYSMAL ATRIAL FIBRILLATION (MULTI): ICD-10-CM

## 2024-01-30 DIAGNOSIS — N40.1 BENIGN PROSTATIC HYPERPLASIA WITH URINARY FREQUENCY: ICD-10-CM

## 2024-01-30 DIAGNOSIS — R35.0 BENIGN PROSTATIC HYPERPLASIA WITH URINARY FREQUENCY: ICD-10-CM

## 2024-01-30 RX ORDER — NEBIVOLOL 2.5 MG/1
2.5 TABLET ORAL DAILY
Qty: 90 TABLET | Refills: 3 | Status: SHIPPED | OUTPATIENT
Start: 2024-01-30 | End: 2024-05-27 | Stop reason: HOSPADM

## 2024-01-30 RX ORDER — TAMSULOSIN HYDROCHLORIDE 0.4 MG/1
0.4 CAPSULE ORAL DAILY
Qty: 90 CAPSULE | Refills: 3 | Status: SHIPPED | OUTPATIENT
Start: 2024-01-30 | End: 2025-01-29

## 2024-04-17 ENCOUNTER — LAB (OUTPATIENT)
Dept: LAB | Facility: LAB | Age: 74
End: 2024-04-17
Payer: MEDICARE

## 2024-04-17 DIAGNOSIS — C61 MALIGNANT NEOPLASM OF PROSTATE (MULTI): Primary | ICD-10-CM

## 2024-04-17 DIAGNOSIS — D64.9 ANEMIA, UNSPECIFIED TYPE: ICD-10-CM

## 2024-04-17 DIAGNOSIS — R73.9 ELEVATED BLOOD SUGAR: ICD-10-CM

## 2024-04-17 DIAGNOSIS — I48.0 PAROXYSMAL ATRIAL FIBRILLATION (MULTI): ICD-10-CM

## 2024-04-17 DIAGNOSIS — Z12.5 SCREENING FOR PROSTATE CANCER: ICD-10-CM

## 2024-04-17 DIAGNOSIS — E78.00 PURE HYPERCHOLESTEROLEMIA: ICD-10-CM

## 2024-04-17 LAB
ALBUMIN SERPL BCP-MCNC: 4.6 G/DL (ref 3.4–5)
ALP SERPL-CCNC: 61 U/L (ref 33–136)
ALT SERPL W P-5'-P-CCNC: 12 U/L (ref 10–52)
ANION GAP SERPL CALC-SCNC: 10 MMOL/L (ref 10–20)
AST SERPL W P-5'-P-CCNC: 13 U/L (ref 9–39)
BILIRUB SERPL-MCNC: 0.8 MG/DL (ref 0–1.2)
BUN SERPL-MCNC: 14 MG/DL (ref 6–23)
CALCIUM SERPL-MCNC: 9.5 MG/DL (ref 8.6–10.3)
CHLORIDE SERPL-SCNC: 105 MMOL/L (ref 98–107)
CHOLEST SERPL-MCNC: 160 MG/DL (ref 0–199)
CHOLESTEROL/HDL RATIO: 3.8
CO2 SERPL-SCNC: 31 MMOL/L (ref 21–32)
CREAT SERPL-MCNC: 0.89 MG/DL (ref 0.5–1.3)
EGFRCR SERPLBLD CKD-EPI 2021: 90 ML/MIN/1.73M*2
ERYTHROCYTE [DISTWIDTH] IN BLOOD BY AUTOMATED COUNT: 13.3 % (ref 11.5–14.5)
EST. AVERAGE GLUCOSE BLD GHB EST-MCNC: 117 MG/DL
GLUCOSE SERPL-MCNC: 92 MG/DL (ref 74–99)
HBA1C MFR BLD: 5.7 %
HCT VFR BLD AUTO: 40.2 % (ref 41–52)
HDLC SERPL-MCNC: 41.9 MG/DL
HGB BLD-MCNC: 12.9 G/DL (ref 13.5–17.5)
LDLC SERPL CALC-MCNC: 95 MG/DL
MCH RBC QN AUTO: 29.1 PG (ref 26–34)
MCHC RBC AUTO-ENTMCNC: 32.1 G/DL (ref 32–36)
MCV RBC AUTO: 91 FL (ref 80–100)
NON HDL CHOLESTEROL: 118 MG/DL (ref 0–149)
NRBC BLD-RTO: 0 /100 WBCS (ref 0–0)
PLATELET # BLD AUTO: 213 X10*3/UL (ref 150–450)
POTASSIUM SERPL-SCNC: 4.4 MMOL/L (ref 3.5–5.3)
PROT SERPL-MCNC: 6.8 G/DL (ref 6.4–8.2)
PSA SERPL-MCNC: 3.13 NG/ML
RBC # BLD AUTO: 4.43 X10*6/UL (ref 4.5–5.9)
SODIUM SERPL-SCNC: 142 MMOL/L (ref 136–145)
TRIGL SERPL-MCNC: 118 MG/DL (ref 0–149)
TSH SERPL-ACNC: 2.45 MIU/L (ref 0.44–3.98)
VLDL: 24 MG/DL (ref 0–40)
WBC # BLD AUTO: 6.5 X10*3/UL (ref 4.4–11.3)

## 2024-04-17 PROCEDURE — 84154 ASSAY OF PSA FREE: CPT

## 2024-04-17 PROCEDURE — 85027 COMPLETE CBC AUTOMATED: CPT

## 2024-04-17 PROCEDURE — 84443 ASSAY THYROID STIM HORMONE: CPT

## 2024-04-17 PROCEDURE — 80061 LIPID PANEL: CPT

## 2024-04-17 PROCEDURE — 84153 ASSAY OF PSA TOTAL: CPT

## 2024-04-17 PROCEDURE — 80053 COMPREHEN METABOLIC PANEL: CPT

## 2024-04-17 PROCEDURE — G0103 PSA SCREENING: HCPCS

## 2024-04-17 PROCEDURE — 83036 HEMOGLOBIN GLYCOSYLATED A1C: CPT

## 2024-04-17 PROCEDURE — 36415 COLL VENOUS BLD VENIPUNCTURE: CPT

## 2024-04-19 LAB
PSA FREE MFR SERPL: 3 %
PSA FREE SERPL-MCNC: 0.1 NG/ML
PSA SERPL IA-MCNC: 3.3 NG/ML (ref 0–4)

## 2024-04-25 ENCOUNTER — APPOINTMENT (OUTPATIENT)
Dept: RADIOLOGY | Facility: HOSPITAL | Age: 74
End: 2024-04-25
Payer: MEDICARE

## 2024-05-04 ENCOUNTER — APPOINTMENT (OUTPATIENT)
Dept: RADIOLOGY | Facility: HOSPITAL | Age: 74
DRG: 177 | End: 2024-05-04
Payer: MEDICARE

## 2024-05-04 ENCOUNTER — HOSPITAL ENCOUNTER (INPATIENT)
Facility: HOSPITAL | Age: 74
LOS: 8 days | Discharge: HOME HEALTH | DRG: 177 | End: 2024-05-12
Attending: EMERGENCY MEDICINE | Admitting: STUDENT IN AN ORGANIZED HEALTH CARE EDUCATION/TRAINING PROGRAM
Payer: MEDICARE

## 2024-05-04 ENCOUNTER — APPOINTMENT (OUTPATIENT)
Dept: CARDIOLOGY | Facility: HOSPITAL | Age: 74
DRG: 177 | End: 2024-05-04
Payer: MEDICARE

## 2024-05-04 DIAGNOSIS — I47.29 NONSUSTAINED VENTRICULAR TACHYCARDIA (MULTI): ICD-10-CM

## 2024-05-04 DIAGNOSIS — I47.10 SUPRAVENTRICULAR TACHYCARDIA (CMS-HCC): ICD-10-CM

## 2024-05-04 DIAGNOSIS — J18.9 COMMUNITY ACQUIRED PNEUMONIA, UNSPECIFIED LATERALITY: ICD-10-CM

## 2024-05-04 DIAGNOSIS — J44.1 COPD EXACERBATION (MULTI): Primary | ICD-10-CM

## 2024-05-04 LAB
ALBUMIN SERPL BCP-MCNC: 4.4 G/DL (ref 3.4–5)
ALP SERPL-CCNC: 48 U/L (ref 33–136)
ALT SERPL W P-5'-P-CCNC: 28 U/L (ref 10–52)
ANION GAP BLDV CALCULATED.4IONS-SCNC: 9 MMOL/L (ref 10–25)
ANION GAP SERPL CALC-SCNC: 11 MMOL/L (ref 10–20)
APTT PPP: 36 SECONDS (ref 27–38)
AST SERPL W P-5'-P-CCNC: 22 U/L (ref 9–39)
BASE EXCESS BLDV CALC-SCNC: 4.8 MMOL/L (ref -2–3)
BASOPHILS # BLD AUTO: 0.02 X10*3/UL (ref 0–0.1)
BASOPHILS NFR BLD AUTO: 0.2 %
BILIRUB SERPL-MCNC: 0.8 MG/DL (ref 0–1.2)
BNP SERPL-MCNC: 81 PG/ML (ref 0–99)
BODY TEMPERATURE: ABNORMAL
BUN SERPL-MCNC: 15 MG/DL (ref 6–23)
CA-I BLDV-SCNC: 1.23 MMOL/L (ref 1.1–1.33)
CALCIUM SERPL-MCNC: 9.5 MG/DL (ref 8.6–10.3)
CARDIAC TROPONIN I PNL SERPL HS: 4 NG/L (ref 0–20)
CARDIAC TROPONIN I PNL SERPL HS: 4 NG/L (ref 0–20)
CHLORIDE BLDV-SCNC: 103 MMOL/L (ref 98–107)
CHLORIDE SERPL-SCNC: 103 MMOL/L (ref 98–107)
CO2 SERPL-SCNC: 31 MMOL/L (ref 21–32)
CREAT SERPL-MCNC: 0.87 MG/DL (ref 0.5–1.3)
EGFRCR SERPLBLD CKD-EPI 2021: >90 ML/MIN/1.73M*2
EOSINOPHIL # BLD AUTO: 0.01 X10*3/UL (ref 0–0.4)
EOSINOPHIL NFR BLD AUTO: 0.1 %
ERYTHROCYTE [DISTWIDTH] IN BLOOD BY AUTOMATED COUNT: 13.5 % (ref 11.5–14.5)
FLUAV RNA RESP QL NAA+PROBE: NOT DETECTED
FLUBV RNA RESP QL NAA+PROBE: NOT DETECTED
GLUCOSE BLDV-MCNC: 176 MG/DL (ref 74–99)
GLUCOSE SERPL-MCNC: 172 MG/DL (ref 74–99)
HCO3 BLDV-SCNC: 30.9 MMOL/L (ref 22–26)
HCT VFR BLD AUTO: 39.9 % (ref 41–52)
HCT VFR BLD EST: 38 % (ref 41–52)
HGB BLD-MCNC: 12.6 G/DL (ref 13.5–17.5)
HGB BLDV-MCNC: 12.8 G/DL (ref 13.5–17.5)
HOLD SPECIMEN: NORMAL
IMM GRANULOCYTES # BLD AUTO: 0.02 X10*3/UL (ref 0–0.5)
IMM GRANULOCYTES NFR BLD AUTO: 0.2 % (ref 0–0.9)
INHALED O2 CONCENTRATION: 32 %
INR PPP: 1.5 (ref 0.9–1.1)
LACTATE BLDV-SCNC: 1.1 MMOL/L (ref 0.4–2)
LYMPHOCYTES # BLD AUTO: 0.19 X10*3/UL (ref 0.8–3)
LYMPHOCYTES NFR BLD AUTO: 2 %
MCH RBC QN AUTO: 29.1 PG (ref 26–34)
MCHC RBC AUTO-ENTMCNC: 31.6 G/DL (ref 32–36)
MCV RBC AUTO: 92 FL (ref 80–100)
MONOCYTES # BLD AUTO: 0.74 X10*3/UL (ref 0.05–0.8)
MONOCYTES NFR BLD AUTO: 8 %
NEUTROPHILS # BLD AUTO: 8.32 X10*3/UL (ref 1.6–5.5)
NEUTROPHILS NFR BLD AUTO: 89.5 %
NRBC BLD-RTO: 0 /100 WBCS (ref 0–0)
OXYHGB MFR BLDV: 93.8 % (ref 45–75)
PCO2 BLDV: 51 MM HG (ref 41–51)
PH BLDV: 7.39 PH (ref 7.33–7.43)
PLATELET # BLD AUTO: 207 X10*3/UL (ref 150–450)
PO2 BLDV: 75 MM HG (ref 35–45)
POTASSIUM BLDV-SCNC: 4.1 MMOL/L (ref 3.5–5.3)
POTASSIUM SERPL-SCNC: 4.2 MMOL/L (ref 3.5–5.3)
PROT SERPL-MCNC: 7.3 G/DL (ref 6.4–8.2)
PROTHROMBIN TIME: 17.5 SECONDS (ref 9.8–12.8)
RBC # BLD AUTO: 4.33 X10*6/UL (ref 4.5–5.9)
SAO2 % BLDV: 97 % (ref 45–75)
SARS-COV-2 RNA RESP QL NAA+PROBE: NOT DETECTED
SODIUM BLDV-SCNC: 139 MMOL/L (ref 136–145)
SODIUM SERPL-SCNC: 141 MMOL/L (ref 136–145)
WBC # BLD AUTO: 9.3 X10*3/UL (ref 4.4–11.3)

## 2024-05-04 PROCEDURE — 87635 SARS-COV-2 COVID-19 AMP PRB: CPT | Performed by: EMERGENCY MEDICINE

## 2024-05-04 PROCEDURE — 2500000004 HC RX 250 GENERAL PHARMACY W/ HCPCS (ALT 636 FOR OP/ED): Performed by: EMERGENCY MEDICINE

## 2024-05-04 PROCEDURE — 94660 CPAP INITIATION&MGMT: CPT

## 2024-05-04 PROCEDURE — 94640 AIRWAY INHALATION TREATMENT: CPT

## 2024-05-04 PROCEDURE — 84132 ASSAY OF SERUM POTASSIUM: CPT | Mod: 91 | Performed by: EMERGENCY MEDICINE

## 2024-05-04 PROCEDURE — 87081 CULTURE SCREEN ONLY: CPT | Mod: STJLAB | Performed by: STUDENT IN AN ORGANIZED HEALTH CARE EDUCATION/TRAINING PROGRAM

## 2024-05-04 PROCEDURE — 36415 COLL VENOUS BLD VENIPUNCTURE: CPT | Performed by: EMERGENCY MEDICINE

## 2024-05-04 PROCEDURE — 71045 X-RAY EXAM CHEST 1 VIEW: CPT | Performed by: RADIOLOGY

## 2024-05-04 PROCEDURE — 2500000004 HC RX 250 GENERAL PHARMACY W/ HCPCS (ALT 636 FOR OP/ED): Performed by: STUDENT IN AN ORGANIZED HEALTH CARE EDUCATION/TRAINING PROGRAM

## 2024-05-04 PROCEDURE — 2500000005 HC RX 250 GENERAL PHARMACY W/O HCPCS: Performed by: STUDENT IN AN ORGANIZED HEALTH CARE EDUCATION/TRAINING PROGRAM

## 2024-05-04 PROCEDURE — 83880 ASSAY OF NATRIURETIC PEPTIDE: CPT | Performed by: EMERGENCY MEDICINE

## 2024-05-04 PROCEDURE — 96375 TX/PRO/DX INJ NEW DRUG ADDON: CPT

## 2024-05-04 PROCEDURE — 87449 NOS EACH ORGANISM AG IA: CPT | Mod: STJLAB | Performed by: STUDENT IN AN ORGANIZED HEALTH CARE EDUCATION/TRAINING PROGRAM

## 2024-05-04 PROCEDURE — 2500000002 HC RX 250 W HCPCS SELF ADMINISTERED DRUGS (ALT 637 FOR MEDICARE OP, ALT 636 FOR OP/ED): Performed by: EMERGENCY MEDICINE

## 2024-05-04 PROCEDURE — 93005 ELECTROCARDIOGRAM TRACING: CPT

## 2024-05-04 PROCEDURE — S4991 NICOTINE PATCH NONLEGEND: HCPCS | Performed by: STUDENT IN AN ORGANIZED HEALTH CARE EDUCATION/TRAINING PROGRAM

## 2024-05-04 PROCEDURE — 85610 PROTHROMBIN TIME: CPT | Performed by: EMERGENCY MEDICINE

## 2024-05-04 PROCEDURE — 2060000001 HC INTERMEDIATE ICU ROOM DAILY

## 2024-05-04 PROCEDURE — 84484 ASSAY OF TROPONIN QUANT: CPT | Performed by: EMERGENCY MEDICINE

## 2024-05-04 PROCEDURE — 96365 THER/PROPH/DIAG IV INF INIT: CPT

## 2024-05-04 PROCEDURE — 99223 1ST HOSP IP/OBS HIGH 75: CPT | Performed by: STUDENT IN AN ORGANIZED HEALTH CARE EDUCATION/TRAINING PROGRAM

## 2024-05-04 PROCEDURE — 2500000001 HC RX 250 WO HCPCS SELF ADMINISTERED DRUGS (ALT 637 FOR MEDICARE OP): Performed by: STUDENT IN AN ORGANIZED HEALTH CARE EDUCATION/TRAINING PROGRAM

## 2024-05-04 PROCEDURE — 87205 SMEAR GRAM STAIN: CPT | Mod: STJLAB | Performed by: STUDENT IN AN ORGANIZED HEALTH CARE EDUCATION/TRAINING PROGRAM

## 2024-05-04 PROCEDURE — 87632 RESP VIRUS 6-11 TARGETS: CPT | Performed by: STUDENT IN AN ORGANIZED HEALTH CARE EDUCATION/TRAINING PROGRAM

## 2024-05-04 PROCEDURE — 2500000006 HC RX 250 W HCPCS SELF ADMINISTERED DRUGS (ALT 637 FOR ALL PAYERS): Mod: MUE | Performed by: STUDENT IN AN ORGANIZED HEALTH CARE EDUCATION/TRAINING PROGRAM

## 2024-05-04 PROCEDURE — 93010 ELECTROCARDIOGRAM REPORT: CPT | Performed by: INTERNAL MEDICINE

## 2024-05-04 PROCEDURE — 96367 TX/PROPH/DG ADDL SEQ IV INF: CPT

## 2024-05-04 PROCEDURE — 99285 EMERGENCY DEPT VISIT HI MDM: CPT | Performed by: EMERGENCY MEDICINE

## 2024-05-04 PROCEDURE — 87040 BLOOD CULTURE FOR BACTERIA: CPT | Mod: 59,STJLAB | Performed by: EMERGENCY MEDICINE

## 2024-05-04 PROCEDURE — 2500000002 HC RX 250 W HCPCS SELF ADMINISTERED DRUGS (ALT 637 FOR MEDICARE OP, ALT 636 FOR OP/ED): Performed by: STUDENT IN AN ORGANIZED HEALTH CARE EDUCATION/TRAINING PROGRAM

## 2024-05-04 PROCEDURE — 85730 THROMBOPLASTIN TIME PARTIAL: CPT | Performed by: EMERGENCY MEDICINE

## 2024-05-04 PROCEDURE — 85025 COMPLETE CBC W/AUTO DIFF WBC: CPT | Performed by: EMERGENCY MEDICINE

## 2024-05-04 PROCEDURE — 87899 AGENT NOS ASSAY W/OPTIC: CPT | Mod: STJLAB | Performed by: STUDENT IN AN ORGANIZED HEALTH CARE EDUCATION/TRAINING PROGRAM

## 2024-05-04 PROCEDURE — 71045 X-RAY EXAM CHEST 1 VIEW: CPT

## 2024-05-04 PROCEDURE — 99285 EMERGENCY DEPT VISIT HI MDM: CPT | Mod: 25

## 2024-05-04 PROCEDURE — 2500000005 HC RX 250 GENERAL PHARMACY W/O HCPCS: Performed by: EMERGENCY MEDICINE

## 2024-05-04 RX ORDER — BUDESONIDE 1 MG/2ML
1 INHALANT ORAL
COMMUNITY

## 2024-05-04 RX ORDER — CEFTRIAXONE 2 G/50ML
2 INJECTION, SOLUTION INTRAVENOUS ONCE
Status: COMPLETED | OUTPATIENT
Start: 2024-05-04 | End: 2024-05-04

## 2024-05-04 RX ORDER — CEFTRIAXONE 2 G/50ML
2 INJECTION, SOLUTION INTRAVENOUS EVERY 24 HOURS
Status: DISCONTINUED | OUTPATIENT
Start: 2024-05-05 | End: 2024-05-04

## 2024-05-04 RX ORDER — IPRATROPIUM BROMIDE AND ALBUTEROL SULFATE 2.5; .5 MG/3ML; MG/3ML
3 SOLUTION RESPIRATORY (INHALATION)
Status: DISCONTINUED | OUTPATIENT
Start: 2024-05-04 | End: 2024-05-07

## 2024-05-04 RX ORDER — ACETAMINOPHEN 325 MG/1
650 TABLET ORAL EVERY 4 HOURS PRN
Status: DISCONTINUED | OUTPATIENT
Start: 2024-05-04 | End: 2024-05-12 | Stop reason: HOSPADM

## 2024-05-04 RX ORDER — ALBUTEROL SULFATE 0.83 MG/ML
2.5 SOLUTION RESPIRATORY (INHALATION) EVERY 2 HOUR PRN
Status: DISCONTINUED | OUTPATIENT
Start: 2024-05-04 | End: 2024-05-12 | Stop reason: HOSPADM

## 2024-05-04 RX ORDER — TRIAMCINOLONE ACETONIDE 1 MG/G
1 OINTMENT TOPICAL 2 TIMES DAILY PRN
COMMUNITY

## 2024-05-04 RX ORDER — ONDANSETRON HYDROCHLORIDE 2 MG/ML
4 INJECTION, SOLUTION INTRAVENOUS EVERY 8 HOURS PRN
Status: DISCONTINUED | OUTPATIENT
Start: 2024-05-04 | End: 2024-05-12 | Stop reason: HOSPADM

## 2024-05-04 RX ORDER — NEBIVOLOL 2.5 MG/1
2.5 TABLET ORAL DAILY
Status: DISCONTINUED | OUTPATIENT
Start: 2024-05-04 | End: 2024-05-12 | Stop reason: HOSPADM

## 2024-05-04 RX ORDER — IBUPROFEN 200 MG
1 TABLET ORAL DAILY
Status: DISCONTINUED | OUTPATIENT
Start: 2024-05-04 | End: 2024-05-12 | Stop reason: HOSPADM

## 2024-05-04 RX ORDER — IPRATROPIUM BROMIDE AND ALBUTEROL SULFATE 2.5; .5 MG/3ML; MG/3ML
9 SOLUTION RESPIRATORY (INHALATION) ONCE
Status: COMPLETED | OUTPATIENT
Start: 2024-05-04 | End: 2024-05-04

## 2024-05-04 RX ORDER — FLUTICASONE PROPIONATE 50 MCG
2 SPRAY, SUSPENSION (ML) NASAL 2 TIMES DAILY
Status: DISCONTINUED | OUTPATIENT
Start: 2024-05-04 | End: 2024-05-12 | Stop reason: HOSPADM

## 2024-05-04 RX ORDER — ROFLUMILAST 500 UG/1
500 TABLET ORAL DAILY
Status: DISCONTINUED | OUTPATIENT
Start: 2024-05-04 | End: 2024-05-12 | Stop reason: HOSPADM

## 2024-05-04 RX ORDER — IPRATROPIUM BROMIDE AND ALBUTEROL 20; 100 UG/1; UG/1
1 SPRAY, METERED RESPIRATORY (INHALATION) EVERY 4 HOURS PRN
COMMUNITY

## 2024-05-04 RX ORDER — DOXYCYCLINE 100 MG/1
100 CAPSULE ORAL EVERY 12 HOURS SCHEDULED
Status: DISCONTINUED | OUTPATIENT
Start: 2024-05-05 | End: 2024-05-04

## 2024-05-04 RX ORDER — MAGNESIUM SULFATE HEPTAHYDRATE 40 MG/ML
2 INJECTION, SOLUTION INTRAVENOUS ONCE
Status: COMPLETED | OUTPATIENT
Start: 2024-05-04 | End: 2024-05-04

## 2024-05-04 RX ORDER — AMMONIUM LACTATE 12 G/100G
1 CREAM TOPICAL 2 TIMES DAILY PRN
Status: DISCONTINUED | OUTPATIENT
Start: 2024-05-04 | End: 2024-05-12 | Stop reason: HOSPADM

## 2024-05-04 RX ORDER — MICONAZOLE NITRATE 2 %
2 CREAM (GRAM) TOPICAL AS NEEDED
Status: DISCONTINUED | OUTPATIENT
Start: 2024-05-04 | End: 2024-05-12 | Stop reason: HOSPADM

## 2024-05-04 RX ORDER — ACETAMINOPHEN 500 MG
5 TABLET ORAL DAILY
Status: DISCONTINUED | OUTPATIENT
Start: 2024-05-04 | End: 2024-05-12 | Stop reason: HOSPADM

## 2024-05-04 RX ORDER — MAGNESIUM SULFATE HEPTAHYDRATE 40 MG/ML
2 INJECTION, SOLUTION INTRAVENOUS ONCE
Status: COMPLETED | OUTPATIENT
Start: 2024-05-04 | End: 2024-05-05

## 2024-05-04 RX ORDER — AMMONIUM LACTATE 12 G/100G
1 CREAM TOPICAL 2 TIMES DAILY PRN
COMMUNITY

## 2024-05-04 RX ORDER — METOPROLOL SUCCINATE 25 MG/1
25 TABLET, EXTENDED RELEASE ORAL DAILY
Status: DISCONTINUED | OUTPATIENT
Start: 2024-05-04 | End: 2024-05-04

## 2024-05-04 RX ORDER — GUAIFENESIN/DEXTROMETHORPHAN 100-10MG/5
5 SYRUP ORAL EVERY 4 HOURS PRN
Status: DISCONTINUED | OUTPATIENT
Start: 2024-05-04 | End: 2024-05-05

## 2024-05-04 RX ORDER — TAMSULOSIN HYDROCHLORIDE 0.4 MG/1
0.4 CAPSULE ORAL NIGHTLY
Status: DISCONTINUED | OUTPATIENT
Start: 2024-05-04 | End: 2024-05-12 | Stop reason: HOSPADM

## 2024-05-04 RX ORDER — BUDESONIDE 0.5 MG/2ML
0.5 INHALANT ORAL
Status: DISCONTINUED | OUTPATIENT
Start: 2024-05-04 | End: 2024-05-12 | Stop reason: HOSPADM

## 2024-05-04 RX ADMIN — RIVAROXABAN 15 MG: 15 TABLET, FILM COATED ORAL at 18:56

## 2024-05-04 RX ADMIN — Medication 5 MG: at 23:37

## 2024-05-04 RX ADMIN — FLUTICASONE PROPIONATE 2 SPRAY: 50 SPRAY, METERED NASAL at 21:00

## 2024-05-04 RX ADMIN — Medication 3 L/MIN: at 14:30

## 2024-05-04 RX ADMIN — IPRATROPIUM BROMIDE AND ALBUTEROL SULFATE 3 ML: 2.5; .5 SOLUTION RESPIRATORY (INHALATION) at 20:26

## 2024-05-04 RX ADMIN — BUDESONIDE 0.5 MG: 0.5 INHALANT RESPIRATORY (INHALATION) at 20:25

## 2024-05-04 RX ADMIN — Medication 3 L/MIN: at 15:10

## 2024-05-04 RX ADMIN — IPRATROPIUM BROMIDE AND ALBUTEROL SULFATE 9 ML: 2.5; .5 SOLUTION RESPIRATORY (INHALATION) at 14:35

## 2024-05-04 RX ADMIN — METHYLPREDNISOLONE SODIUM SUCCINATE 125 MG: 125 INJECTION, POWDER, FOR SOLUTION INTRAMUSCULAR; INTRAVENOUS at 15:25

## 2024-05-04 RX ADMIN — MAGNESIUM SULFATE HEPTAHYDRATE 2 G: 40 INJECTION, SOLUTION INTRAVENOUS at 14:43

## 2024-05-04 RX ADMIN — NEBIVOLOL HYDROCHLORIDE 2.5 MG: 2.5 TABLET ORAL at 23:37

## 2024-05-04 RX ADMIN — TAMSULOSIN HYDROCHLORIDE 0.4 MG: 0.4 CAPSULE ORAL at 23:37

## 2024-05-04 RX ADMIN — PIPERACILLIN SODIUM AND TAZOBACTAM SODIUM 3.38 G: 3; .375 INJECTION, SOLUTION INTRAVENOUS at 18:56

## 2024-05-04 RX ADMIN — Medication 3 L/MIN: at 18:30

## 2024-05-04 RX ADMIN — DOXYCYCLINE 100 MG: 100 INJECTION, POWDER, LYOPHILIZED, FOR SOLUTION INTRAVENOUS at 16:09

## 2024-05-04 RX ADMIN — METHYLPREDNISOLONE SODIUM SUCCINATE 40 MG: 40 INJECTION, POWDER, FOR SOLUTION INTRAMUSCULAR; INTRAVENOUS at 23:33

## 2024-05-04 RX ADMIN — ROFLUMILAST 500 MCG: 500 TABLET ORAL at 23:38

## 2024-05-04 RX ADMIN — Medication 3 L/MIN: at 23:16

## 2024-05-04 RX ADMIN — NICOTINE 1 PATCH: 21 PATCH, EXTENDED RELEASE TRANSDERMAL at 18:56

## 2024-05-04 RX ADMIN — CEFTRIAXONE SODIUM 2 G: 2 INJECTION, SOLUTION INTRAVENOUS at 15:26

## 2024-05-04 SDOH — HEALTH STABILITY: MENTAL HEALTH: EXPERIENCED ANY OF THE FOLLOWING LIFE EVENTS: DEATH OF FAMILY/FRIEND

## 2024-05-04 SDOH — ECONOMIC STABILITY: INCOME INSECURITY: HOW HARD IS IT FOR YOU TO PAY FOR THE VERY BASICS LIKE FOOD, HOUSING, MEDICAL CARE, AND HEATING?: NOT HARD AT ALL

## 2024-05-04 SDOH — SOCIAL STABILITY: SOCIAL INSECURITY: ABUSE: ADULT

## 2024-05-04 SDOH — SOCIAL STABILITY: SOCIAL INSECURITY: ARE THERE ANY APPARENT SIGNS OF INJURIES/BEHAVIORS THAT COULD BE RELATED TO ABUSE/NEGLECT?: NO

## 2024-05-04 SDOH — ECONOMIC STABILITY: HOUSING INSECURITY
IN THE LAST 12 MONTHS, WAS THERE A TIME WHEN YOU DID NOT HAVE A STEADY PLACE TO SLEEP OR SLEPT IN A SHELTER (INCLUDING NOW)?: NO

## 2024-05-04 SDOH — SOCIAL STABILITY: SOCIAL INSECURITY: WERE YOU ABLE TO COMPLETE ALL THE BEHAVIORAL HEALTH SCREENINGS?: YES

## 2024-05-04 SDOH — ECONOMIC STABILITY: INCOME INSECURITY: IN THE LAST 12 MONTHS, WAS THERE A TIME WHEN YOU WERE NOT ABLE TO PAY THE MORTGAGE OR RENT ON TIME?: NO

## 2024-05-04 SDOH — ECONOMIC STABILITY: HOUSING INSECURITY: IN THE LAST 12 MONTHS, HOW MANY PLACES HAVE YOU LIVED?: 1

## 2024-05-04 SDOH — SOCIAL STABILITY: SOCIAL INSECURITY: DO YOU FEEL ANYONE HAS EXPLOITED OR TAKEN ADVANTAGE OF YOU FINANCIALLY OR OF YOUR PERSONAL PROPERTY?: NO

## 2024-05-04 SDOH — SOCIAL STABILITY: SOCIAL INSECURITY: DO YOU FEEL UNSAFE GOING BACK TO THE PLACE WHERE YOU ARE LIVING?: NO

## 2024-05-04 SDOH — SOCIAL STABILITY: SOCIAL INSECURITY: HAVE YOU HAD THOUGHTS OF HARMING ANYONE ELSE?: NO

## 2024-05-04 SDOH — SOCIAL STABILITY: SOCIAL INSECURITY: DOES ANYONE TRY TO KEEP YOU FROM HAVING/CONTACTING OTHER FRIENDS OR DOING THINGS OUTSIDE YOUR HOME?: NO

## 2024-05-04 SDOH — SOCIAL STABILITY: SOCIAL INSECURITY: HAS ANYONE EVER THREATENED TO HURT YOUR FAMILY OR YOUR PETS?: NO

## 2024-05-04 SDOH — ECONOMIC STABILITY: TRANSPORTATION INSECURITY
IN THE PAST 12 MONTHS, HAS LACK OF TRANSPORTATION KEPT YOU FROM MEETINGS, WORK, OR FROM GETTING THINGS NEEDED FOR DAILY LIVING?: NO

## 2024-05-04 SDOH — SOCIAL STABILITY: SOCIAL INSECURITY: ARE YOU OR HAVE YOU BEEN THREATENED OR ABUSED PHYSICALLY, EMOTIONALLY, OR SEXUALLY BY ANYONE?: NO

## 2024-05-04 SDOH — ECONOMIC STABILITY: TRANSPORTATION INSECURITY
IN THE PAST 12 MONTHS, HAS THE LACK OF TRANSPORTATION KEPT YOU FROM MEDICAL APPOINTMENTS OR FROM GETTING MEDICATIONS?: NO

## 2024-05-04 SDOH — SOCIAL STABILITY: SOCIAL INSECURITY: HAVE YOU HAD ANY THOUGHTS OF HARMING ANYONE ELSE?: NO

## 2024-05-04 ASSESSMENT — LIFESTYLE VARIABLES
SUBSTANCE_ABUSE_PAST_12_MONTHS: NO
SKIP TO QUESTIONS 9-10: 1
HOW OFTEN DO YOU HAVE A DRINK CONTAINING ALCOHOL: NEVER
HOW MANY STANDARD DRINKS CONTAINING ALCOHOL DO YOU HAVE ON A TYPICAL DAY: PATIENT DOES NOT DRINK
HAVE YOU EVER FELT YOU SHOULD CUT DOWN ON YOUR DRINKING: NO
AUDIT-C TOTAL SCORE: 0
HOW MANY STANDARD DRINKS CONTAINING ALCOHOL DO YOU HAVE ON A TYPICAL DAY: PATIENT DOES NOT DRINK
AUDIT-C TOTAL SCORE: 0
PRESCIPTION_ABUSE_PAST_12_MONTHS: NO
EVER HAD A DRINK FIRST THING IN THE MORNING TO STEADY YOUR NERVES TO GET RID OF A HANGOVER: NO
TOTAL SCORE: 0
AUDIT-C TOTAL SCORE: 0
SKIP TO QUESTIONS 9-10: 1
EVER FELT BAD OR GUILTY ABOUT YOUR DRINKING: NO
HOW OFTEN DO YOU HAVE 6 OR MORE DRINKS ON ONE OCCASION: NEVER
HOW OFTEN DO YOU HAVE A DRINK CONTAINING ALCOHOL: NEVER
HOW OFTEN DO YOU HAVE 6 OR MORE DRINKS ON ONE OCCASION: NEVER
HAVE PEOPLE ANNOYED YOU BY CRITICIZING YOUR DRINKING: NO
AUDIT-C TOTAL SCORE: 0

## 2024-05-04 ASSESSMENT — PATIENT HEALTH QUESTIONNAIRE - PHQ9
2. FEELING DOWN, DEPRESSED OR HOPELESS: SEVERAL DAYS
1. LITTLE INTEREST OR PLEASURE IN DOING THINGS: SEVERAL DAYS
SUM OF ALL RESPONSES TO PHQ9 QUESTIONS 1 & 2: 4
1. LITTLE INTEREST OR PLEASURE IN DOING THINGS: MORE THAN HALF THE DAYS
2. FEELING DOWN, DEPRESSED OR HOPELESS: MORE THAN HALF THE DAYS
SUM OF ALL RESPONSES TO PHQ9 QUESTIONS 1 & 2: 2

## 2024-05-04 ASSESSMENT — PAIN SCALES - GENERAL
PAINLEVEL_OUTOF10: 0 - NO PAIN

## 2024-05-04 ASSESSMENT — COGNITIVE AND FUNCTIONAL STATUS - GENERAL
DAILY ACTIVITIY SCORE: 24
MOBILITY SCORE: 24
WALKING IN HOSPITAL ROOM: A LITTLE
MOVING TO AND FROM BED TO CHAIR: A LITTLE
MOBILITY SCORE: 20
DAILY ACTIVITIY SCORE: 24
PATIENT BASELINE BEDBOUND: NO
CLIMB 3 TO 5 STEPS WITH RAILING: A LITTLE
STANDING UP FROM CHAIR USING ARMS: A LITTLE

## 2024-05-04 ASSESSMENT — PAIN - FUNCTIONAL ASSESSMENT
PAIN_FUNCTIONAL_ASSESSMENT: 0-10

## 2024-05-04 ASSESSMENT — ACTIVITIES OF DAILY LIVING (ADL)
GROOMING: INDEPENDENT
BATHING: INDEPENDENT
WALKS IN HOME: INDEPENDENT
PATIENT'S MEMORY ADEQUATE TO SAFELY COMPLETE DAILY ACTIVITIES?: YES
HEARING - RIGHT EAR: FUNCTIONAL
HEARING - LEFT EAR: FUNCTIONAL
FEEDING YOURSELF: INDEPENDENT
JUDGMENT_ADEQUATE_SAFELY_COMPLETE_DAILY_ACTIVITIES: YES
DRESSING YOURSELF: INDEPENDENT
ADEQUATE_TO_COMPLETE_ADL: YES
TOILETING: NEEDS ASSISTANCE

## 2024-05-04 NOTE — ED PROVIDER NOTES
EMERGENCY DEPARTMENT ENCOUNTER      Pt Name: Raheel Cloud  MRN: [id number]  Birthdate 1950  Date of evaluation: 2024  Provider: Maico Palumbo DO    CHIEF COMPLAINT       Chief Complaint   Patient presents with    Shortness of Breath     HISTORY OF PRESENT ILLNESS    Raheel Cloud is a 73 y.o. year old male who presents to the ER for dyspnea for at least 1 week.  Grandkids recently visited with croup.  Cough, chest congestion, hasn't been productive  Spouse  10 days ago, burial was 2 days ago  No missed xarelto doses  Appetite has been diminished due to grief, no unexpected weight loss, no chest pain, no abdominal pain    On 4 days of azithro and prednisone 40qd  Last duoneb 1230    PMH COPD on 2-3L, AF on xarelto, prostate cancer treated with chemo and radiation; last leupron 1 year ago  PSH pSBO, inguinal herniorrhaphy  NKDA  100pk yr tobacco, no alcohol, no drugs  PAST MEDICAL HISTORY     Past Medical History:   Diagnosis Date    Personal history of other diseases of the respiratory system 2021    History of chronic obstructive lung disease     CURRENT MEDICATIONS       Current Discharge Medication List        CONTINUE these medications which have NOT CHANGED    Details   ammonium lactate (Amlactin) 12 % cream Apply 1 Application topically 2 times a day as needed for dry skin.      budesonide (Pulmicort) 1 mg/2 mL nebulizer solution Take 2 mL (1 mg) by nebulization 2 times a day. Rinse mouth with water after use to reduce aftertaste and incidence of candidiasis. Do not swallow.      Flomax 0.4 mg 24 hr capsule Take 1 capsule (0.4 mg) by mouth once daily.  Qty: 90 capsule, Refills: 3    Associated Diagnoses: Benign prostatic hyperplasia with urinary frequency      ipratropium-albuteroL (Combivent Respimat)  mcg/actuation inhaler Inhale 1 puff every 4 hours if needed for wheezing.      ipratropium-albuteroL (Duo-Neb) 0.5-2.5 mg/3 mL nebulizer solution Inhale 3 mL 4 times a day.       nebivolol (Bystolic) 2.5 mg tablet Take 1 tablet (2.5 mg) by mouth once daily.  Qty: 90 tablet, Refills: 3    Associated Diagnoses: Paroxysmal atrial fibrillation (Multi)      rivaroxaban (Xarelto) 15 mg tablet Take 1 tablet (15 mg) by mouth once daily in the evening. Take with meals. Take with food.  Qty: 30 tablet, Refills: 11    Associated Diagnoses: Atrial fibrillation, unspecified type (Multi)      roflumilast (Daliresp) 250 mcg tablet Take 2 tablets (500 mcg) by mouth once daily.  Qty: 180 tablet, Refills: 3    Associated Diagnoses: Chronic obstructive pulmonary disease, unspecified COPD type (Multi)      triamcinolone (Kenalog) 0.1 % ointment Apply 1 Application topically 2 times a day as needed.      Ventolin HFA 90 mcg/actuation inhaler Inhale 2 puffs every 4 hours if needed for wheezing.  Qty: 18 g, Refills: 3    Associated Diagnoses: Chronic obstructive pulmonary disease, unspecified COPD type (Multi)           SURGICAL HISTORY       Past Surgical History:   Procedure Laterality Date    OTHER SURGICAL HISTORY  09/24/2020    Umbilical hernia repair laparoscopic    OTHER SURGICAL HISTORY  09/24/2020    Peritoneal adhesiolysis     ALLERGIES     Patient has no known allergies.  FAMILY HISTORY     No family history on file.  SOCIAL HISTORY       Social History     Tobacco Use    Smoking status: Every Day     Current packs/day: 2.00     Types: Cigarettes    Smokeless tobacco: Never   Vaping Use    Vaping status: Never Used   Substance Use Topics    Alcohol use: Not Currently    Drug use: Not Currently     PHYSICAL EXAM  (up to 7 for level 4, 8 or more for level 5)     ED Triage Vitals   Temp Pulse Resp BP   -- -- -- --      SpO2 Temp src Heart Rate Source Patient Position   -- -- -- --      BP Location FiO2 (%)     -- --       Physical Exam  Vitals and nursing note reviewed.   Constitutional:       General: He is not in acute distress.     Appearance: Normal appearance. He is ill-appearing.   HENT:      Head:  Normocephalic and atraumatic. No raccoon eyes, Winslow's sign, contusion or laceration.      Jaw: No trismus or malocclusion.      Right Ear: External ear normal.      Left Ear: External ear normal.      Mouth/Throat:      Mouth: Mucous membranes are moist.      Pharynx: Oropharynx is clear.   Eyes:      Extraocular Movements: Extraocular movements intact.      Pupils: Pupils are equal, round, and reactive to light.   Neck:      Trachea: No tracheal deviation.   Cardiovascular:      Rate and Rhythm: Normal rate and regular rhythm.      Pulses: Normal pulses.   Pulmonary:      Effort: Tachypnea present. No respiratory distress.      Breath sounds: No stridor. Examination of the right-upper field reveals decreased breath sounds. Examination of the left-upper field reveals decreased breath sounds. Examination of the right-middle field reveals decreased breath sounds. Examination of the left-middle field reveals decreased breath sounds. Examination of the right-lower field reveals decreased breath sounds. Examination of the left-lower field reveals decreased breath sounds. Decreased breath sounds present. No wheezing, rhonchi or rales.      Comments: Wet cough, tripoding  Chest:      Chest wall: No tenderness.   Abdominal:      General: Abdomen is flat.      Palpations: Abdomen is soft. There is no mass.      Tenderness: There is no abdominal tenderness.   Musculoskeletal:         General: No tenderness or signs of injury.      Cervical back: Normal range of motion. No tenderness.      Right lower leg: No edema.      Left lower leg: No edema.   Skin:     Coloration: Skin is not jaundiced or pale.      Findings: No petechiae, rash or wound.   Neurological:      General: No focal deficit present.      Mental Status: He is alert and oriented to person, place, and time.   Psychiatric:         Speech: Speech normal.         Thought Content: Thought content does not include homicidal or suicidal ideation.        DIAGNOSTIC  RESULTS   LABS:  Labs Reviewed   CBC WITH AUTO DIFFERENTIAL - Abnormal       Result Value    WBC 9.3      nRBC 0.0      RBC 4.33 (*)     Hemoglobin 12.6 (*)     Hematocrit 39.9 (*)     MCV 92      MCH 29.1      MCHC 31.6 (*)     RDW 13.5      Platelets 207      Neutrophils % 89.5      Immature Granulocytes %, Automated 0.2      Lymphocytes % 2.0      Monocytes % 8.0      Eosinophils % 0.1      Basophils % 0.2      Neutrophils Absolute 8.32 (*)     Immature Granulocytes Absolute, Automated 0.02      Lymphocytes Absolute 0.19 (*)     Monocytes Absolute 0.74      Eosinophils Absolute 0.01      Basophils Absolute 0.02     COMPREHENSIVE METABOLIC PANEL - Abnormal    Glucose 172 (*)     Sodium 141      Potassium 4.2      Chloride 103      Bicarbonate 31      Anion Gap 11      Urea Nitrogen 15      Creatinine 0.87      eGFR >90      Calcium 9.5      Albumin 4.4      Alkaline Phosphatase 48      Total Protein 7.3      AST 22      Bilirubin, Total 0.8      ALT 28     PROTIME-INR - Abnormal    Protime 17.5 (*)     INR 1.5 (*)    BLOOD GAS VENOUS FULL PANEL - Abnormal    POCT pH, Venous 7.39      POCT pCO2, Venous 51      POCT pO2, Venous 75 (*)     POCT SO2, Venous 97 (*)     POCT Oxy Hemoglobin, Venous 93.8 (*)     POCT Hematocrit Calculated, Venous 38.0 (*)     POCT Sodium, Venous 139      POCT Potassium, Venous 4.1      POCT Chloride, Venous 103      POCT Ionized Calicum, Venous 1.23      POCT Glucose, Venous 176 (*)     POCT Lactate, Venous 1.1      POCT Base Excess, Venous 4.8 (*)     POCT HCO3 Calculated, Venous 30.9 (*)     POCT Hemoglobin, Venous 12.8 (*)     POCT Anion Gap, Venous 9.0 (*)     Patient Temperature        FiO2 32     APTT - Normal    aPTT 36      Narrative:     The APTT is no longer used for monitoring Unfractionated Heparin Therapy. For monitoring Heparin Therapy, use the Heparin Assay.   B-TYPE NATRIURETIC PEPTIDE - Normal    BNP 81      Narrative:        <100 pg/mL - Heart failure unlikely  100-299  pg/mL - Intermediate probability of acute heart                  failure exacerbation. Correlate with clinical                  context and patient history.    >=300 pg/mL - Heart Failure likely. Correlate with clinical                  context and patient history.    BNP testing is performed using different testing methodology at [facility] than at other [facility]. Direct result comparisons should only be made within the same method.      SERIAL TROPONIN-INITIAL - Normal    Troponin I, High Sensitivity 4      Narrative:     Less than 99th percentile of normal range cutoff-  Female and children under 18 years old <14 ng/L; Male <21 ng/L: Negative  Repeat testing should be performed if clinically indicated.     Female and children under 18 years old 14-50 ng/L; Male 21-50 ng/L:  Consistent with possible cardiac damage and possible increased clinical   risk. Serial measurements may help to assess extent of myocardial damage.     >50 ng/L: Consistent with cardiac damage, increased clinical risk and  myocardial infarction. Serial measurements may help assess extent of   myocardial damage.      NOTE: Children less than 1 year old may have higher baseline troponin   levels and results should be interpreted in conjunction with the overall   clinical context.     NOTE: Troponin I testing is performed using a different   testing methodology at [facility] than at other   [facility]. Direct result comparisons should only   be made within the same method.   SARS-COV-2 PCR - Normal    Coronavirus 2019, PCR Not Detected      Narrative:     This assay has received FDA Emergency Use Authorization (EUA) and is only authorized for the duration of time that circumstances exist to justify the authorization of the emergency use of in vitro diagnostic tests for the detection of SARS-CoV-2 virus and/or diagnosis of COVID-19 infection under section 564(b)(1) of the Act, 21 U.S.C. 360bbb-3(b)(1).  This assay is an in vitro diagnostic nucleic acid amplification test for the qualitative detection of SARS-CoV-2 from nasopharyngeal specimens and has been validated for use at [facility]. Negative results do not preclude COVID-19 infections and should not be used as the sole basis for diagnosis, treatment, or other management decisions.     INFLUENZA A AND B PCR - Normal    Flu A Result Not Detected      Flu B Result Not Detected      Narrative:     This assay is an in vitro diagnostic multiplex nucleic acid amplification test for the detection and discrimination of Influenza A & B from nasopharyngeal specimens, and has been validated for use at [facility]. Negative results do not preclude Influenza A/B infections, and should not be used as the sole basis for diagnosis, treatment, or other management decisions. If Influenza A/B and RSV PCR results are negative, testing for Parainfluenza virus, Adenovirus and Metapneumovirus is routinely performed for [facility]ology and intensive care inpatients, and is available on other patients by placing an add-on request.   SERIAL TROPONIN, 1 HOUR - Normal    Troponin I, High Sensitivity 4      Narrative:     Less than 99th percentile of normal range cutoff-  Female and children under 18 years old <14 ng/L; Male <21 ng/L: Negative  Repeat testing should be performed if clinically indicated.     Female and children under 18 years old 14-50 ng/L; Male 21-50 ng/L:  Consistent with possible cardiac damage and possible increased clinical   risk. Serial measurements may help to assess extent of myocardial damage.     >50 ng/L: Consistent with cardiac damage, increased clinical risk and  myocardial infarction. Serial measurements may help assess extent of   myocardial damage.      NOTE: Children less than 1 year old may have higher baseline troponin   levels and results should be interpreted in conjunction with the overall    clinical context.     NOTE: Troponin I testing is performed using a different   testing methodology at [facility] than at other   [facility] hospitals. Direct result comparisons should only   be made within the same method.   BLOOD CULTURE   BLOOD CULTURE   RESPIRATORY CULTURE/SMEAR   LEGIONELLA ANTIGEN, URINE   STREPTOCOCCUS PNEUMONIAE ANTIGEN, URINE   STAPHYLOCOCCUS AUREUS/MRSA COLONIZATION, CULTURE   TROPONIN SERIES- (INITIAL, 1 HR)    Narrative:     The following orders were created for panel order Troponin I Series, High Sensitivity (0, 1 HR).  Procedure                               Abnormality         Status                     ---------                               -----------         ------                     Troponin I, High Sensiti...[[id number]]  Normal              Final result               Troponin, High Sensitivi...[[id number]]  Normal              Final result                 Please view results for these tests on the individual orders.   GRAY TOP    Extra Tube Hold for add-ons.     RESPIRATORY VIRAL PANEL   CBC   BASIC METABOLIC PANEL   MAGNESIUM   PHOSPHORUS     All other labs were within normal range or not returned as of this dictation.  Imaging  XR chest 1 view   Final Result   No focal infiltrate or pneumothorax is identified.        MACRO:   None.        Signed by: Alli Castellanos 5/4/2024 3:17 PM   Dictation workstation:   [id number]         Procedure  Procedures  EMERGENCY DEPARTMENT COURSE/MDM:   Medical Decision Making    Vitals:    Vitals:    05/04/24 1625 05/04/24 1655 05/04/24 1732 05/04/24 2016   BP: 126/56 (!) 113/39 132/55 (!) 122/48   BP Location:  Right arm Right arm Right arm   Patient Position:  Sitting Sitting    Pulse: 97 91 94 88   Resp: (!) 26 (!) 24 23 (!) 28   Temp:    36 °C (96.8 °F)   TempSrc:    Temporal   SpO2: 97% 98% 98% 96%   Weight:       Height:         Raheel Cloud is a male 73 y.o. who presents to the ER for dyspnea. On arrival the patients vital  signs were: Afebrile, Reguar HR, Normotensive, Tachypneic, and Oxygenating well on supplemental oxygen. History obtained from: patient and Son.  Given concern for COPD exacerbation supplemental oxygenation provided along with 3 DuoNebs, steroids, mag.  Cardiac workup initiated.  ED Course as of 05/04/24 2155   Sat May 04, 2024   1447 BLOOD GAS VENOUS FULL PANEL(!)  No significant acidosis or alkalosis, no hypercapnia [CB]   1448 CBC and Auto Differential(!)  No acute leukocytosis, leukopenia, thrombocytosis, thrombocytopenia, or anemia [CB]   1456 Protime-INR(!)  Therapeutically anticoagulated [CB]   1506 Comprehensive Metabolic Panel(!)  Normoglycemic, no acute electrolyte or hepatorenal abnormality [CB]   1506 Troponin I Series, High Sensitivity (0, 1 HR)  Initial value not elevated, ACS less likely [CB]   1508 BNP: 81  Not elevated, CHF exacerbation unlikely [CB]   1532 XR chest 1 view  Appears to have multifocal infiltrates per my independent interpretation.  Given failure of azithromycin plan for Rocephin and Doxy for commune acquired pneumonia treatment. [CB]   1532 Coronavirus 2019, PCR: Not Detected [CB]   1532 Flu B Result: Not Detected [CB]   1532 Flu A Result: Not Detected [CB]      ED Course User Index  [CB] Maico Palumbo,          Diagnoses as of 05/04/24 2155   COPD exacerbation (Multi)   Community acquired pneumonia, unspecified laterality       External Records Reviewed: I reviewed recent and relevant outside records including inpatient notes, outpatient records  Prescription Drug Consideration: Azithromycin failed, doxycycline provided for atypical coverage    Shared decision making for disposition  Patient and/or patient´s representative was counseled regarding labs, imaging, likely diagnosis. All questions were answered. Recommendation was made   for Admission given the need for further escalation of care to inpatient management. Patient agreed and was admitted in stable condition. Admitting  team was notified of any pending labs or imaging to ensure continuity of care.     ED Medications administered this visit:    Medications   oxygen (O2) therapy (has no administration in time range)   acetaminophen (Tylenol) tablet 650 mg (has no administration in time range)   ondansetron (Zofran) injection 4 mg (has no administration in time range)   dextromethorphan-guaifenesin (Robitussin DM)  mg/5 mL oral liquid 5 mL (has no administration in time range)   benzocaine-menthol (Cepastat Sore Throat) lozenge 1 lozenge (has no administration in time range)   ipratropium-albuteroL (Duo-Neb) 0.5-2.5 mg/3 mL nebulizer solution 3 mL (3 mL nebulization Given 5/4/24 2026)   albuterol 2.5 mg /3 mL (0.083 %) nebulizer solution 2.5 mg (has no administration in time range)   methylPREDNISolone sod succinate (SOLU-Medrol) 40 mg/mL injection 40 mg (has no administration in time range)   budesonide (Pulmicort) 0.5 mg/2 mL nebulizer solution 0.5 mg (0.5 mg nebulization Given 5/4/24 2025)   nicotine (Nicoderm CQ) 21 mg/24 hr patch 1 patch (1 patch transdermal Medication Applied 5/4/24 1856)   nicotine polacrilex (Nicorette) gum 2 mg (has no administration in time range)   fluticasone (Flonase) nasal spray 2 spray (has no administration in time range)   sodium chloride (Ocean) 0.65 % nasal spray 1 spray (has no administration in time range)   oxygen (O2) therapy (3 L/min inhalation Start 5/4/24 1830)   ammonium lactate (Amlactin) 12 % cream 1 Application (has no administration in time range)   tamsulosin (Flomax) 24 hr capsule 0.4 mg (has no administration in time range)   metoprolol succinate XL (Toprol-XL) 24 hr tablet 25 mg (25 mg oral Not Given 5/4/24 1830)   rivaroxaban (Xarelto) tablet 15 mg (15 mg oral Given 5/4/24 1856)   roflumilast (Daliresp) tablet 500 mcg (has no administration in time range)   piperacillin-tazobactam-dextrose (Zosyn) IV 3.375 g (3.375 g intravenous New Bag 5/4/24 6974)   ipratropium-albuteroL  (Duo-Neb) 0.5-2.5 mg/3 mL nebulizer solution 9 mL (9 mL nebulization Given 5/4/24 1435)   magnesium sulfate IV 2 g (0 g intravenous Stopped 5/4/24 1535)   methylPREDNISolone sod succinate (SOLU-Medrol) injection 125 mg (125 mg intravenous Given 5/4/24 1525)   cefTRIAXone (Rocephin) 2 g in dextrose 5% in water (D5W) 50 mL (0 g intravenous Stopped 5/4/24 1556)   doxycycline (Vibramycin) in sodium chloride 0.9 % 100 mL  mg (100 mg intravenous New Bag 5/4/24 1609)          Final Impression:   1. COPD exacerbation (Multi)    2. Community acquired pneumonia, unspecified laterality          Please excuse any misspellings or unintended errors related to the Dragon speech recognition software used to dictate this note.    I reviewed the case with the attending ED physician. The attending ED physician agrees with the plan.      Maico Palumbo,   Resident  05/04/24 6161

## 2024-05-04 NOTE — H&P
History Of Present Illness  Raheel Cloud is a 73 y.o. male presenting with shortness of breath.  Patient reports over the last 4 days he has had worsening dyspnea on exertion and even shortness of breath at rest.  He has been feeling rundown over the last 7 to 10 days as approximately 10 days ago his wife passed away.  He has had family visiting him who were also sick including grandkids with croup.  He states that he has not been sleeping or eating well during this time of grief.  He states that he then started to feel more short of breath and got to the point overnight that he felt like he could not sleep because it was either breath or sleep. He also reports cough and congestion. He also reports a history of COPD with as needed oxygen use.  He reports he primarily uses it with activity at baseline however over the last 4 days he has been using it continuously.    In the ED vital signs were significant for respiratory rate of 37.  Labs were significant for an INR of 1.5, hemoglobin 12.6.  Troponin was 4 x 2.  COVID and flu were negative.  VBG showed a pH of 7.39, pCO2 51, pO2 75.  Blood cultures were collected.  CXR read showed no focal infiltrate or pneumothorax however imaging suspicious for pneumonia.  Patient was given 2 g of magnesium, 125 mg of IV Solu-Medrol, 3 DuoNeb treatments as well as doxycycline and Rocephin.  Patient was noted to have respiratory distress and was placed on BiPAP with improvement.  He will be admitted for acute on chronic hypoxic respiratory failure secondary to COPD exacerbation with suspected pneumonia.     FULL CODE    Past Medical History  He has a past medical history of Personal history of other diseases of the respiratory system (06/23/2021).    Surgical History  He has a past surgical history that includes Other surgical history (09/24/2020) and Other surgical history (09/24/2020).     Social History  He reports that he has been smoking cigarettes. He has never used  smokeless tobacco. He reports that he does not currently use alcohol. He reports that he does not currently use drugs.    Family History  No family history on file.     Allergies  Patient has no known allergies.    Review of Systems   As per HPI    Physical Exam   Constitutional: Well developed, no distress though looks comfortable, sitting forward, calm and cooperative  Skin: Warm and dry  Eyes: EOMI, clear sclera  ENMT: mucous membranes moist  Respiratory: tight and diminished posteriorly, faint wheeze anteriorly, pursed lip breathing, NC in place, wet cough, mild conversational dyspnea    Cardiovascular: Regular, rate and rhythm  Abdominal: Nondistended, soft, non-tender, +BS  MSK: ROM intact, +1 bilateral LE pitting edema  Neuro: alert and oriented x3    Last Recorded Vitals  BP (!) 113/39 (BP Location: Right arm, Patient Position: Sitting)   Pulse 91   Temp 36.5 °C (97.7 °F) (Temporal)   Resp (!) 24   Wt 64.9 kg (143 lb)   SpO2 98%     Relevant Results  XR chest 1 view    Result Date: 5/4/2024  Interpreted By:  Alli Castellanos, STUDY: XR CHEST 1 VIEW  5/4/2024 2:46 pm   INDICATION: Signs/Symptoms:Chest Pain   COMPARISON: 05/21/2023   ACCESSION NUMBER(S): [id number]   ORDERING CLINICIAN: BESS TIWARI   TECHNIQUE: 2 AP portable radiographs of the chest were obtained   FINDINGS: Multiple cardiac monitoring leads are seen over the chest.   No focal infiltrate, pleural effusion or pneumothorax is identified. The cardiac silhouette is within normal limits for size.       No focal infiltrate or pneumothorax is identified.   MACRO: None.   Signed by: Alli Castellanos 5/4/2024 3:17 PM Dictation workstation:   [id number]     Results for orders placed or performed during the hospital encounter of 05/04/24 (from the past 24 hour(s))   CBC and Auto Differential   Result Value Ref Range    WBC 9.3 4.4 - 11.3 x10*3/uL    nRBC 0.0 0.0 - 0.0 /100 WBCs    RBC 4.33 (L) 4.50 - 5.90 x10*6/uL    Hemoglobin 12.6 (L) 13.5 - 17.5  g/dL    Hematocrit 39.9 (L) 41.0 - 52.0 %    MCV 92 80 - 100 fL    MCH 29.1 26.0 - 34.0 pg    MCHC 31.6 (L) 32.0 - 36.0 g/dL    RDW 13.5 11.5 - 14.5 %    Platelets 207 150 - 450 x10*3/uL    Neutrophils % 89.5 40.0 - 80.0 %    Immature Granulocytes %, Automated 0.2 0.0 - 0.9 %    Lymphocytes % 2.0 13.0 - 44.0 %    Monocytes % 8.0 2.0 - 10.0 %    Eosinophils % 0.1 0.0 - 6.0 %    Basophils % 0.2 0.0 - 2.0 %    Neutrophils Absolute 8.32 (H) 1.60 - 5.50 x10*3/uL    Immature Granulocytes Absolute, Automated 0.02 0.00 - 0.50 x10*3/uL    Lymphocytes Absolute 0.19 (L) 0.80 - 3.00 x10*3/uL    Monocytes Absolute 0.74 0.05 - 0.80 x10*3/uL    Eosinophils Absolute 0.01 0.00 - 0.40 x10*3/uL    Basophils Absolute 0.02 0.00 - 0.10 x10*3/uL   Comprehensive Metabolic Panel   Result Value Ref Range    Glucose 172 (H) 74 - 99 mg/dL    Sodium 141 136 - 145 mmol/L    Potassium 4.2 3.5 - 5.3 mmol/L    Chloride 103 98 - 107 mmol/L    Bicarbonate 31 21 - 32 mmol/L    Anion Gap 11 10 - 20 mmol/L    Urea Nitrogen 15 6 - 23 mg/dL    Creatinine 0.87 0.50 - 1.30 mg/dL    eGFR >90 >60 mL/min/1.73m*2    Calcium 9.5 8.6 - 10.3 mg/dL    Albumin 4.4 3.4 - 5.0 g/dL    Alkaline Phosphatase 48 33 - 136 U/L    Total Protein 7.3 6.4 - 8.2 g/dL    AST 22 9 - 39 U/L    Bilirubin, Total 0.8 0.0 - 1.2 mg/dL    ALT 28 10 - 52 U/L   aPTT   Result Value Ref Range    aPTT 36 27 - 38 seconds   Protime-INR   Result Value Ref Range    Protime 17.5 (H) 9.8 - 12.8 seconds    INR 1.5 (H) 0.9 - 1.1   B-Type Natriuretic Peptide   Result Value Ref Range    BNP 81 0 - 99 pg/mL   Troponin I, High Sensitivity, Initial   Result Value Ref Range    Troponin I, High Sensitivity 4 0 - 20 ng/L   Sars-CoV-2 PCR   Result Value Ref Range    Coronavirus 2019, PCR Not Detected Not Detected   Influenza A, and B PCR   Result Value Ref Range    Flu A Result Not Detected Not Detected    Flu B Result Not Detected Not Detected   BLOOD GAS VENOUS FULL PANEL   Result Value Ref Range    POCT pH,  Venous 7.39 7.33 - 7.43 pH    POCT pCO2, Venous 51 41 - 51 mm Hg    POCT pO2, Venous 75 (H) 35 - 45 mm Hg    POCT SO2, Venous 97 (H) 45 - 75 %    POCT Oxy Hemoglobin, Venous 93.8 (H) 45.0 - 75.0 %    POCT Hematocrit Calculated, Venous 38.0 (L) 41.0 - 52.0 %    POCT Sodium, Venous 139 136 - 145 mmol/L    POCT Potassium, Venous 4.1 3.5 - 5.3 mmol/L    POCT Chloride, Venous 103 98 - 107 mmol/L    POCT Ionized Calicum, Venous 1.23 1.10 - 1.33 mmol/L    POCT Glucose, Venous 176 (H) 74 - 99 mg/dL    POCT Lactate, Venous 1.1 0.4 - 2.0 mmol/L    POCT Base Excess, Venous 4.8 (H) -2.0 - 3.0 mmol/L    POCT HCO3 Calculated, Venous 30.9 (H) 22.0 - 26.0 mmol/L    POCT Hemoglobin, Venous 12.8 (L) 13.5 - 17.5 g/dL    POCT Anion Gap, Venous 9.0 (L) 10.0 - 25.0 mmol/L    Patient Temperature      FiO2 32 %   Gray Top   Result Value Ref Range    Extra Tube Hold for add-ons.    Troponin, High Sensitivity, 1 Hour   Result Value Ref Range    Troponin I, High Sensitivity 4 0 - 20 ng/L          Assessment/Plan   Principal Problem:    COPD exacerbation (Multi)  Pneumonia  Acute on chronic hypoxic respiratory failure   pAfib on xarelto   Hx prostate Ca  HLD  Nicotine use    -xray imaging suspicious for pneumonia despite read  -recently staying in the hospital with family, will broaden coverage   -start zosyn   -f/u MRSA, legionella, strep pn  -f/u respiratory viral panel  -f/u sputum culture   -f/u Bcx   -covid/flu neg   -home O2 2L PRN, currently on 3L and PRN bipap   -continue supplemental O2 to maintain SpO2 > 90%  -acapella  -duonebs q4hrs, PRN albuterol q2hr   -budesonide  -robitussin PRN cough  -received solumedrol 125mg in ED  -continue solumedrol 40mg q8hrs   -received magnesium 2g in ED  -flonase BID, PRN ocean nasal spray   -tele monitoring   -continue home meds     ANNAI   -encourage oral hydration   -replete electrolytes PRN  -regular diet   -GI ppx: n/a  -DVT ppx: SCDs, xarelto    Dispo: This is a 73-year-old male who is admitted  with acute on chronic hypoxic respiratory failure secondary to COPD exacerbation and suspected pneumonia.  Currently receiving frequent breathing treatments, IV steroids and IV antibiotics.  Will likely stay greater than 2 midnights.    Mago Sales, DO

## 2024-05-04 NOTE — NURSING NOTE
Arrived from ED via stretcher, assisted to bed, short of breath with any exertion, requesting to take a break from bipap at this time, RT aware, MD in to see patient, pt's son at bedside, see flowsheet for complete assessment

## 2024-05-05 ENCOUNTER — APPOINTMENT (OUTPATIENT)
Dept: RADIOLOGY | Facility: HOSPITAL | Age: 74
DRG: 177 | End: 2024-05-05
Payer: MEDICARE

## 2024-05-05 ENCOUNTER — APPOINTMENT (OUTPATIENT)
Dept: CARDIOLOGY | Facility: HOSPITAL | Age: 74
DRG: 177 | End: 2024-05-05
Payer: MEDICARE

## 2024-05-05 LAB
ANION GAP SERPL CALC-SCNC: 9 MMOL/L (ref 10–20)
ATRIAL RATE: 89 BPM
ATRIAL RATE: 91 BPM
BACTERIA SPEC RESP CULT: ABNORMAL
BUN SERPL-MCNC: 18 MG/DL (ref 6–23)
CALCIUM SERPL-MCNC: 8.9 MG/DL (ref 8.6–10.3)
CHLORIDE SERPL-SCNC: 104 MMOL/L (ref 98–107)
CO2 SERPL-SCNC: 31 MMOL/L (ref 21–32)
CREAT SERPL-MCNC: 0.88 MG/DL (ref 0.5–1.3)
EGFRCR SERPLBLD CKD-EPI 2021: >90 ML/MIN/1.73M*2
ERYTHROCYTE [DISTWIDTH] IN BLOOD BY AUTOMATED COUNT: 13.6 % (ref 11.5–14.5)
GLUCOSE SERPL-MCNC: 157 MG/DL (ref 74–99)
GRAM STN SPEC: ABNORMAL
HCT VFR BLD AUTO: 36.8 % (ref 41–52)
HGB BLD-MCNC: 11.1 G/DL (ref 13.5–17.5)
LEGIONELLA AG UR QL: NEGATIVE
MAGNESIUM SERPL-MCNC: 3.01 MG/DL (ref 1.6–2.4)
MCH RBC QN AUTO: 28.3 PG (ref 26–34)
MCHC RBC AUTO-ENTMCNC: 30.2 G/DL (ref 32–36)
MCV RBC AUTO: 94 FL (ref 80–100)
NRBC BLD-RTO: 0 /100 WBCS (ref 0–0)
P AXIS: 78 DEGREES
P AXIS: 78 DEGREES
P OFFSET: 220 MS
P OFFSET: 225 MS
P ONSET: 166 MS
P ONSET: 167 MS
PHOSPHATE SERPL-MCNC: 4.1 MG/DL (ref 2.5–4.9)
PLATELET # BLD AUTO: 210 X10*3/UL (ref 150–450)
POTASSIUM SERPL-SCNC: 4.5 MMOL/L (ref 3.5–5.3)
PR INTERVAL: 124 MS
PR INTERVAL: 124 MS
Q ONSET: 228 MS
Q ONSET: 229 MS
QRS COUNT: 14 BEATS
QRS COUNT: 15 BEATS
QRS DURATION: 84 MS
QRS DURATION: 86 MS
QT INTERVAL: 366 MS
QT INTERVAL: 370 MS
QTC CALCULATION(BAZETT): 445 MS
QTC CALCULATION(BAZETT): 455 MS
QTC FREDERICIA: 417 MS
QTC FREDERICIA: 425 MS
R AXIS: 89 DEGREES
R AXIS: 94 DEGREES
RBC # BLD AUTO: 3.92 X10*6/UL (ref 4.5–5.9)
S PNEUM AG UR QL: NEGATIVE
SODIUM SERPL-SCNC: 139 MMOL/L (ref 136–145)
T AXIS: 54 DEGREES
T AXIS: 62 DEGREES
T OFFSET: 411 MS
T OFFSET: 414 MS
VENTRICULAR RATE: 89 BPM
VENTRICULAR RATE: 91 BPM
WBC # BLD AUTO: 7.4 X10*3/UL (ref 4.4–11.3)

## 2024-05-05 PROCEDURE — 71250 CT THORAX DX C-: CPT

## 2024-05-05 PROCEDURE — 2060000001 HC INTERMEDIATE ICU ROOM DAILY

## 2024-05-05 PROCEDURE — 93005 ELECTROCARDIOGRAM TRACING: CPT

## 2024-05-05 PROCEDURE — 80048 BASIC METABOLIC PNL TOTAL CA: CPT | Performed by: STUDENT IN AN ORGANIZED HEALTH CARE EDUCATION/TRAINING PROGRAM

## 2024-05-05 PROCEDURE — 99233 SBSQ HOSP IP/OBS HIGH 50: CPT | Performed by: STUDENT IN AN ORGANIZED HEALTH CARE EDUCATION/TRAINING PROGRAM

## 2024-05-05 PROCEDURE — 84100 ASSAY OF PHOSPHORUS: CPT | Performed by: STUDENT IN AN ORGANIZED HEALTH CARE EDUCATION/TRAINING PROGRAM

## 2024-05-05 PROCEDURE — 2500000005 HC RX 250 GENERAL PHARMACY W/O HCPCS: Performed by: STUDENT IN AN ORGANIZED HEALTH CARE EDUCATION/TRAINING PROGRAM

## 2024-05-05 PROCEDURE — 2500000001 HC RX 250 WO HCPCS SELF ADMINISTERED DRUGS (ALT 637 FOR MEDICARE OP): Performed by: STUDENT IN AN ORGANIZED HEALTH CARE EDUCATION/TRAINING PROGRAM

## 2024-05-05 PROCEDURE — 94640 AIRWAY INHALATION TREATMENT: CPT

## 2024-05-05 PROCEDURE — 2500000004 HC RX 250 GENERAL PHARMACY W/ HCPCS (ALT 636 FOR OP/ED): Performed by: STUDENT IN AN ORGANIZED HEALTH CARE EDUCATION/TRAINING PROGRAM

## 2024-05-05 PROCEDURE — 71250 CT THORAX DX C-: CPT | Performed by: RADIOLOGY

## 2024-05-05 PROCEDURE — 2500000002 HC RX 250 W HCPCS SELF ADMINISTERED DRUGS (ALT 637 FOR MEDICARE OP, ALT 636 FOR OP/ED): Mod: MUE | Performed by: STUDENT IN AN ORGANIZED HEALTH CARE EDUCATION/TRAINING PROGRAM

## 2024-05-05 PROCEDURE — 36415 COLL VENOUS BLD VENIPUNCTURE: CPT | Performed by: STUDENT IN AN ORGANIZED HEALTH CARE EDUCATION/TRAINING PROGRAM

## 2024-05-05 PROCEDURE — 2500000006 HC RX 250 W HCPCS SELF ADMINISTERED DRUGS (ALT 637 FOR ALL PAYERS): Mod: MUE | Performed by: STUDENT IN AN ORGANIZED HEALTH CARE EDUCATION/TRAINING PROGRAM

## 2024-05-05 PROCEDURE — S4991 NICOTINE PATCH NONLEGEND: HCPCS | Performed by: STUDENT IN AN ORGANIZED HEALTH CARE EDUCATION/TRAINING PROGRAM

## 2024-05-05 PROCEDURE — 5A09357 ASSISTANCE WITH RESPIRATORY VENTILATION, LESS THAN 24 CONSECUTIVE HOURS, CONTINUOUS POSITIVE AIRWAY PRESSURE: ICD-10-PCS | Performed by: INTERNAL MEDICINE

## 2024-05-05 PROCEDURE — 85027 COMPLETE CBC AUTOMATED: CPT | Performed by: STUDENT IN AN ORGANIZED HEALTH CARE EDUCATION/TRAINING PROGRAM

## 2024-05-05 PROCEDURE — 83735 ASSAY OF MAGNESIUM: CPT | Performed by: STUDENT IN AN ORGANIZED HEALTH CARE EDUCATION/TRAINING PROGRAM

## 2024-05-05 PROCEDURE — 94660 CPAP INITIATION&MGMT: CPT

## 2024-05-05 RX ORDER — GUAIFENESIN/DEXTROMETHORPHAN 100-10MG/5
5 SYRUP ORAL EVERY 4 HOURS
Status: DISCONTINUED | OUTPATIENT
Start: 2024-05-05 | End: 2024-05-06

## 2024-05-05 RX ORDER — FORMOTEROL FUMARATE DIHYDRATE 20 UG/2ML
20 SOLUTION RESPIRATORY (INHALATION)
Status: DISCONTINUED | OUTPATIENT
Start: 2024-05-05 | End: 2024-05-07

## 2024-05-05 RX ADMIN — RIVAROXABAN 15 MG: 15 TABLET, FILM COATED ORAL at 17:00

## 2024-05-05 RX ADMIN — NEBIVOLOL HYDROCHLORIDE 2.5 MG: 2.5 TABLET ORAL at 22:24

## 2024-05-05 RX ADMIN — FLUTICASONE PROPIONATE 2 SPRAY: 50 SPRAY, METERED NASAL at 09:24

## 2024-05-05 RX ADMIN — GUAIFENESIN SYRUP AND DEXTROMETHORPHAN 5 ML: 100; 10 SYRUP ORAL at 22:21

## 2024-05-05 RX ADMIN — METHYLPREDNISOLONE SODIUM SUCCINATE 40 MG: 40 INJECTION, POWDER, FOR SOLUTION INTRAMUSCULAR; INTRAVENOUS at 06:35

## 2024-05-05 RX ADMIN — METHYLPREDNISOLONE SODIUM SUCCINATE 40 MG: 40 INJECTION, POWDER, FOR SOLUTION INTRAMUSCULAR; INTRAVENOUS at 15:58

## 2024-05-05 RX ADMIN — PIPERACILLIN SODIUM AND TAZOBACTAM SODIUM 3.38 G: 3; .375 INJECTION, SOLUTION INTRAVENOUS at 02:45

## 2024-05-05 RX ADMIN — MAGNESIUM SULFATE HEPTAHYDRATE 2 G: 40 INJECTION, SOLUTION INTRAVENOUS at 00:27

## 2024-05-05 RX ADMIN — PIPERACILLIN SODIUM AND TAZOBACTAM SODIUM 3.38 G: 3; .375 INJECTION, SOLUTION INTRAVENOUS at 17:56

## 2024-05-05 RX ADMIN — IPRATROPIUM BROMIDE AND ALBUTEROL SULFATE 3 ML: 2.5; .5 SOLUTION RESPIRATORY (INHALATION) at 03:00

## 2024-05-05 RX ADMIN — IPRATROPIUM BROMIDE AND ALBUTEROL SULFATE 3 ML: 2.5; .5 SOLUTION RESPIRATORY (INHALATION) at 08:15

## 2024-05-05 RX ADMIN — FORMOTEROL FUMARATE 20 MCG: 20 SOLUTION RESPIRATORY (INHALATION) at 08:16

## 2024-05-05 RX ADMIN — Medication 3 L/MIN: at 08:18

## 2024-05-05 RX ADMIN — PIPERACILLIN SODIUM AND TAZOBACTAM SODIUM 3.38 G: 3; .375 INJECTION, SOLUTION INTRAVENOUS at 10:48

## 2024-05-05 RX ADMIN — FLUTICASONE PROPIONATE 2 SPRAY: 50 SPRAY, METERED NASAL at 22:22

## 2024-05-05 RX ADMIN — BUDESONIDE 0.5 MG: 0.5 INHALANT RESPIRATORY (INHALATION) at 08:15

## 2024-05-05 RX ADMIN — IPRATROPIUM BROMIDE AND ALBUTEROL SULFATE 3 ML: 2.5; .5 SOLUTION RESPIRATORY (INHALATION) at 20:00

## 2024-05-05 RX ADMIN — IPRATROPIUM BROMIDE AND ALBUTEROL SULFATE 3 ML: 2.5; .5 SOLUTION RESPIRATORY (INHALATION) at 00:05

## 2024-05-05 RX ADMIN — ROFLUMILAST 500 MCG: 500 TABLET ORAL at 22:23

## 2024-05-05 RX ADMIN — GUAIFENESIN SYRUP AND DEXTROMETHORPHAN 5 ML: 100; 10 SYRUP ORAL at 15:59

## 2024-05-05 RX ADMIN — GUAIFENESIN SYRUP AND DEXTROMETHORPHAN 5 ML: 100; 10 SYRUP ORAL at 09:33

## 2024-05-05 RX ADMIN — IPRATROPIUM BROMIDE AND ALBUTEROL SULFATE 3 ML: 2.5; .5 SOLUTION RESPIRATORY (INHALATION) at 12:13

## 2024-05-05 RX ADMIN — Medication 5 MG: at 22:20

## 2024-05-05 RX ADMIN — FORMOTEROL FUMARATE 20 MCG: 20 SOLUTION RESPIRATORY (INHALATION) at 20:00

## 2024-05-05 RX ADMIN — NICOTINE 1 PATCH: 21 PATCH, EXTENDED RELEASE TRANSDERMAL at 09:24

## 2024-05-05 RX ADMIN — IPRATROPIUM BROMIDE AND ALBUTEROL SULFATE 3 ML: 2.5; .5 SOLUTION RESPIRATORY (INHALATION) at 22:44

## 2024-05-05 RX ADMIN — METHYLPREDNISOLONE SODIUM SUCCINATE 40 MG: 40 INJECTION, POWDER, FOR SOLUTION INTRAMUSCULAR; INTRAVENOUS at 22:19

## 2024-05-05 RX ADMIN — BUDESONIDE 0.5 MG: 0.5 INHALANT RESPIRATORY (INHALATION) at 20:00

## 2024-05-05 RX ADMIN — IPRATROPIUM BROMIDE AND ALBUTEROL SULFATE 3 ML: 2.5; .5 SOLUTION RESPIRATORY (INHALATION) at 16:39

## 2024-05-05 RX ADMIN — TAMSULOSIN HYDROCHLORIDE 0.4 MG: 0.4 CAPSULE ORAL at 22:20

## 2024-05-05 ASSESSMENT — COGNITIVE AND FUNCTIONAL STATUS - GENERAL
CLIMB 3 TO 5 STEPS WITH RAILING: A LITTLE
MOBILITY SCORE: 23
DAILY ACTIVITIY SCORE: 24

## 2024-05-05 ASSESSMENT — PAIN SCALES - GENERAL
PAINLEVEL_OUTOF10: 0 - NO PAIN

## 2024-05-05 ASSESSMENT — PAIN - FUNCTIONAL ASSESSMENT
PAIN_FUNCTIONAL_ASSESSMENT: 0-10

## 2024-05-05 NOTE — PROGRESS NOTES
Spiritual Care Visit    Clinical Encounter Type  Visited With: Patient and family together  Routine Visit: Introduction  Continue Visiting: Yes    Restoration Encounters  Restoration Needs: Prayer         Sacramental Encounters  Communion: Patient wants communion  Communion Given Indicator: Yes  Sacrament of Sick-Anointing: Anointed, Patient requested anointing                             Taxonomy  Intended Effects: Establish rapport and connectedness, Yenny affirmation, Build relationship of care and support, Demonstrate caring and concern    His wife Rissa Hurt  here last week.

## 2024-05-05 NOTE — PROGRESS NOTES
"Raheel Cloud \"Raheel MOORE" is a 73 y.o. male on day 1 of admission presenting with COPD exacerbation (Multi).    Subjective   Patient seen and examined at bedside.  Patient reports that he is feeling a little bit better today than he was yesterday.  He reports that he still did not get a lot of sleep last night.  He states that when he coughs he brings up phlegm and then when he has the BiPAP on he feels like he is forced to swallow it and then he cannot breathe.  He states he fell asleep from about 3 until 4:30am and slept well during that time, he hopes that maybe he can have less interruptions tonight.       Objective     Physical Exam    Constitutional: Well developed, no distress, looks comfortable, sitting forward, calm and cooperative  Skin: Warm and dry  Eyes: EOMI, clear sclera  ENMT: mucous membranes moist  Respiratory: improved aeration to bases, no wheezes noted, pursed lip breathing, NC in place, wet persistent cough, mild conversational dyspnea    Cardiovascular: Regular, rate and rhythm  Abdominal: Nondistended, soft, non-tender, +BS  MSK: ROM intact, +1 bilateral LE pitting edema  Neuro: alert and oriented x3    Last Recorded Vitals  Blood pressure (!) 108/46, pulse 84, temperature 36.4 °C (97.5 °F), temperature source Temporal, resp. rate 26, height 1.753 m (5' 9\"), weight 68 kg (149 lb 14.6 oz), SpO2 97%.  Intake/Output last 3 Shifts:  I/O last 3 completed shifts:  In: 121.7 (1.8 mL/kg) [I.V.:71.7 (1.1 mL/kg); IV Piggyback:50]  Out: 450 (6.6 mL/kg) [Urine:450 (0.2 mL/kg/hr)]  Weight: 68 kg     Relevant Results  Scheduled medications  budesonide, 0.5 mg, nebulization, BID  dextromethorphan-guaifenesin, 5 mL, oral, q4h  fluticasone, 2 spray, Each Nostril, BID  formoterol, 20 mcg, nebulization, BID  ipratropium-albuteroL, 3 mL, nebulization, q4h  melatonin, 5 mg, oral, Daily  methylPREDNISolone sodium succinate (PF), 40 mg, intravenous, q8h  nebivolol, 2.5 mg, oral, Daily  nicotine, 1 patch, " transdermal, Daily  piperacillin-tazobactam, 3.375 g, intravenous, q8h  rivaroxaban, 15 mg, oral, Daily with evening meal  roflumilast, 500 mcg, oral, Daily  tamsulosin, 0.4 mg, oral, Nightly      Continuous medications     PRN medications  PRN medications: acetaminophen, albuterol, ammonium lactate, benzocaine-menthol, nicotine polacrilex, ondansetron, oxygen, oxygen, sodium chloride  Results from last 7 days   Lab Units 05/05/24  0428 05/04/24  1433   WBC AUTO x10*3/uL 7.4 9.3   RBC AUTO x10*6/uL 3.92* 4.33*   HEMOGLOBIN g/dL 11.1* 12.6*     Results from last 7 days   Lab Units 05/05/24  0429 05/04/24  1433   SODIUM mmol/L 139 141   POTASSIUM mmol/L 4.5 4.2   CHLORIDE mmol/L 104 103   CO2 mmol/L 31 31   BUN mg/dL 18 15   CREATININE mg/dL 0.88 0.87   CALCIUM mg/dL 8.9 9.5   PHOSPHORUS mg/dL 4.1  --    MAGNESIUM mg/dL 3.01*  --    BILIRUBIN TOTAL mg/dL  --  0.8   ALT U/L  --  28   AST U/L  --  22       ECG 12 lead    Result Date: 5/4/2024  Normal sinus rhythm with sinus arrhythmia Normal ECG When compared with ECG of 20-MAY-2023 23:55, No significant change was found    ECG 12 lead    Result Date: 5/4/2024  Normal sinus rhythm Rightward axis Borderline ECG When compared with ECG of 04-MAY-2024 14:34, (unconfirmed) No significant change was found    XR chest 1 view    Result Date: 5/4/2024  Interpreted By:  Alli Castellanos, STUDY: XR CHEST 1 VIEW  5/4/2024 2:46 pm   INDICATION: Signs/Symptoms:Chest Pain   COMPARISON: 05/21/2023   ACCESSION NUMBER(S): [id number]   ORDERING CLINICIAN: BESS TIWARI   TECHNIQUE: 2 AP portable radiographs of the chest were obtained   FINDINGS: Multiple cardiac monitoring leads are seen over the chest.   No focal infiltrate, pleural effusion or pneumothorax is identified. The cardiac silhouette is within normal limits for size.       No focal infiltrate or pneumothorax is identified.   MACRO: None.   Signed by: Alli Castellanos 5/4/2024 3:17 PM Dictation workstation:   [id number]       Assessment/Plan   Principal Problem:    COPD exacerbation (Multi)  Pneumonia  Acute on chronic hypoxic respiratory failure   pAfib on xarelto   Hx prostate Ca  HLD  Nicotine use     -improved aeration on PE today   -xray imaging suspicious for pneumonia despite read  -f/u CT chest   -recently staying in the hospital with family, will broaden coverage   -continue zosyn   -f/u MRSA, legionella, strep pn  -f/u respiratory viral panel  -f/u sputum culture   -f/u Bcx   -covid/flu neg   -home O2 2L PRN, currently on 3L and PRN bipap   -continue supplemental O2 to maintain SpO2 > 90%  -acapella  -duonebs q4hrs, PRN albuterol q2hr   -budesonide/formoterol  -scheduled robitussin DM  -continue solumedrol 40mg q8hrs   -flonase BID, PRN ocean nasal spray   -tele monitoring   -continue home meds      FENGI   -encourage oral hydration   -replete electrolytes PRN  -regular diet   -GI ppx: n/a  -DVT ppx: SCDs, xarelto     Dispo: This is a 73-year-old male who is admitted with acute on chronic hypoxic respiratory failure secondary to COPD exacerbation and suspected pneumonia.  Currently receiving frequent breathing treatments, IV steroids and IV antibiotics.  Will likely stay greater than 2 midnights.     Mago Sales DO

## 2024-05-05 NOTE — CARE PLAN
The patient's goals for the shift include to not be transferred to ICU    The clinical goals for the shift include pulse ox will be >92% by end shift    Over the shift, the patient did not make progress toward the following goals. Barriers to progression include needing bipap today. Recommendations to address these barriers include continuing plan of care..    Patient relates is feeling better since admission, IV antibiotics and oxygen usage continue as order. Multiple family member in today providing emotional support. To aide in recovery, pt requests minimal interruptions if sleeping.

## 2024-05-05 NOTE — CARE PLAN
S: Shift note.   B: Pt admitted for copd exacerbation.   A: Pt with increased work of breathing at start of this shift while on 3lpm nasal cannula, pt sitting leaning forward onto bedside table, lungs diminished. Pt report no improvement in breathing after breathing treatment and being placed on bipap. Dr Alvarenga at bedside to evaluate patient at 2236 for further recommendations. Pt medicated with one time order for melatonin and iv magnesium. Pt slept at intervals throughout this shift, pt able to lay back into bed appearing more comfortable and less tense. Pt able to sit in bed and not need to lean onto table for breaths. Cough becoming more productive throughout this shift with thick yellow/white sputum.   R: Plan for ct of chest today. Continue with iv steroid and zoysn as ordered. Awaiting further recommendations from attending.

## 2024-05-06 ENCOUNTER — APPOINTMENT (OUTPATIENT)
Dept: CARDIOLOGY | Facility: HOSPITAL | Age: 74
DRG: 177 | End: 2024-05-06
Payer: MEDICARE

## 2024-05-06 LAB
ANION GAP SERPL CALC-SCNC: 11 MMOL/L (ref 10–20)
BUN SERPL-MCNC: 17 MG/DL (ref 6–23)
CALCIUM SERPL-MCNC: 9.1 MG/DL (ref 8.6–10.3)
CHLORIDE SERPL-SCNC: 101 MMOL/L (ref 98–107)
CO2 SERPL-SCNC: 33 MMOL/L (ref 21–32)
CREAT SERPL-MCNC: 0.88 MG/DL (ref 0.5–1.3)
EGFRCR SERPLBLD CKD-EPI 2021: >90 ML/MIN/1.73M*2
ERYTHROCYTE [DISTWIDTH] IN BLOOD BY AUTOMATED COUNT: 13.4 % (ref 11.5–14.5)
GLUCOSE SERPL-MCNC: 144 MG/DL (ref 74–99)
HCT VFR BLD AUTO: 38.9 % (ref 41–52)
HGB BLD-MCNC: 12 G/DL (ref 13.5–17.5)
MCH RBC QN AUTO: 28.9 PG (ref 26–34)
MCHC RBC AUTO-ENTMCNC: 30.8 G/DL (ref 32–36)
MCV RBC AUTO: 94 FL (ref 80–100)
NRBC BLD-RTO: 0 /100 WBCS (ref 0–0)
PLATELET # BLD AUTO: 223 X10*3/UL (ref 150–450)
POTASSIUM SERPL-SCNC: 4.7 MMOL/L (ref 3.5–5.3)
RBC # BLD AUTO: 4.15 X10*6/UL (ref 4.5–5.9)
SODIUM SERPL-SCNC: 140 MMOL/L (ref 136–145)
WBC # BLD AUTO: 8 X10*3/UL (ref 4.4–11.3)

## 2024-05-06 PROCEDURE — 93308 TTE F-UP OR LMTD: CPT | Performed by: INTERNAL MEDICINE

## 2024-05-06 PROCEDURE — 2060000001 HC INTERMEDIATE ICU ROOM DAILY

## 2024-05-06 PROCEDURE — 85027 COMPLETE CBC AUTOMATED: CPT | Performed by: STUDENT IN AN ORGANIZED HEALTH CARE EDUCATION/TRAINING PROGRAM

## 2024-05-06 PROCEDURE — 94640 AIRWAY INHALATION TREATMENT: CPT

## 2024-05-06 PROCEDURE — 2500000005 HC RX 250 GENERAL PHARMACY W/O HCPCS: Performed by: STUDENT IN AN ORGANIZED HEALTH CARE EDUCATION/TRAINING PROGRAM

## 2024-05-06 PROCEDURE — 93325 DOPPLER ECHO COLOR FLOW MAPG: CPT | Performed by: INTERNAL MEDICINE

## 2024-05-06 PROCEDURE — 93321 DOPPLER ECHO F-UP/LMTD STD: CPT | Performed by: INTERNAL MEDICINE

## 2024-05-06 PROCEDURE — 2500000002 HC RX 250 W HCPCS SELF ADMINISTERED DRUGS (ALT 637 FOR MEDICARE OP, ALT 636 FOR OP/ED): Performed by: STUDENT IN AN ORGANIZED HEALTH CARE EDUCATION/TRAINING PROGRAM

## 2024-05-06 PROCEDURE — 80048 BASIC METABOLIC PNL TOTAL CA: CPT | Performed by: STUDENT IN AN ORGANIZED HEALTH CARE EDUCATION/TRAINING PROGRAM

## 2024-05-06 PROCEDURE — 99233 SBSQ HOSP IP/OBS HIGH 50: CPT | Performed by: STUDENT IN AN ORGANIZED HEALTH CARE EDUCATION/TRAINING PROGRAM

## 2024-05-06 PROCEDURE — 2500000004 HC RX 250 GENERAL PHARMACY W/ HCPCS (ALT 636 FOR OP/ED): Performed by: STUDENT IN AN ORGANIZED HEALTH CARE EDUCATION/TRAINING PROGRAM

## 2024-05-06 PROCEDURE — 2500000001 HC RX 250 WO HCPCS SELF ADMINISTERED DRUGS (ALT 637 FOR MEDICARE OP): Performed by: STUDENT IN AN ORGANIZED HEALTH CARE EDUCATION/TRAINING PROGRAM

## 2024-05-06 PROCEDURE — S4991 NICOTINE PATCH NONLEGEND: HCPCS | Performed by: STUDENT IN AN ORGANIZED HEALTH CARE EDUCATION/TRAINING PROGRAM

## 2024-05-06 PROCEDURE — 2500000006 HC RX 250 W HCPCS SELF ADMINISTERED DRUGS (ALT 637 FOR ALL PAYERS): Performed by: STUDENT IN AN ORGANIZED HEALTH CARE EDUCATION/TRAINING PROGRAM

## 2024-05-06 PROCEDURE — 36415 COLL VENOUS BLD VENIPUNCTURE: CPT | Performed by: STUDENT IN AN ORGANIZED HEALTH CARE EDUCATION/TRAINING PROGRAM

## 2024-05-06 PROCEDURE — 94660 CPAP INITIATION&MGMT: CPT

## 2024-05-06 PROCEDURE — 93308 TTE F-UP OR LMTD: CPT

## 2024-05-06 RX ORDER — CEFTRIAXONE 1 G/50ML
1 INJECTION, SOLUTION INTRAVENOUS EVERY 24 HOURS
Status: DISCONTINUED | OUTPATIENT
Start: 2024-05-06 | End: 2024-05-06

## 2024-05-06 RX ORDER — GUAIFENESIN/DEXTROMETHORPHAN 100-10MG/5
10 SYRUP ORAL EVERY 4 HOURS
Status: DISCONTINUED | OUTPATIENT
Start: 2024-05-06 | End: 2024-05-07

## 2024-05-06 RX ORDER — CEFTRIAXONE 2 G/50ML
2 INJECTION, SOLUTION INTRAVENOUS EVERY 24 HOURS
Status: DISCONTINUED | OUTPATIENT
Start: 2024-05-06 | End: 2024-05-11

## 2024-05-06 RX ORDER — BISMUTH SUBSALICYLATE 262 MG/1
524 TABLET ORAL EVERY 6 HOURS PRN
Status: DISCONTINUED | OUTPATIENT
Start: 2024-05-06 | End: 2024-05-12 | Stop reason: HOSPADM

## 2024-05-06 RX ADMIN — METHYLPREDNISOLONE SODIUM SUCCINATE 40 MG: 40 INJECTION, POWDER, FOR SOLUTION INTRAMUSCULAR; INTRAVENOUS at 23:20

## 2024-05-06 RX ADMIN — FLUTICASONE PROPIONATE 2 SPRAY: 50 SPRAY, METERED NASAL at 09:07

## 2024-05-06 RX ADMIN — Medication 2 L/MIN: at 09:23

## 2024-05-06 RX ADMIN — CEFTRIAXONE SODIUM 2 G: 2 INJECTION, SOLUTION INTRAVENOUS at 21:33

## 2024-05-06 RX ADMIN — GUAIFENESIN SYRUP AND DEXTROMETHORPHAN 10 ML: 100; 10 SYRUP ORAL at 15:02

## 2024-05-06 RX ADMIN — FLUTICASONE PROPIONATE 2 SPRAY: 50 SPRAY, METERED NASAL at 23:20

## 2024-05-06 RX ADMIN — Medication 5 MG: at 21:33

## 2024-05-06 RX ADMIN — FORMOTEROL FUMARATE 20 MCG: 20 SOLUTION RESPIRATORY (INHALATION) at 22:35

## 2024-05-06 RX ADMIN — GUAIFENESIN SYRUP AND DEXTROMETHORPHAN 10 ML: 100; 10 SYRUP ORAL at 18:37

## 2024-05-06 RX ADMIN — FORMOTEROL FUMARATE 20 MCG: 20 SOLUTION RESPIRATORY (INHALATION) at 09:23

## 2024-05-06 RX ADMIN — BUDESONIDE 0.5 MG: 0.5 INHALANT RESPIRATORY (INHALATION) at 09:23

## 2024-05-06 RX ADMIN — GUAIFENESIN SYRUP AND DEXTROMETHORPHAN 10 ML: 100; 10 SYRUP ORAL at 21:34

## 2024-05-06 RX ADMIN — Medication 2 L/MIN: at 08:00

## 2024-05-06 RX ADMIN — GUAIFENESIN SYRUP AND DEXTROMETHORPHAN 5 ML: 100; 10 SYRUP ORAL at 05:44

## 2024-05-06 RX ADMIN — NEBIVOLOL HYDROCHLORIDE 2.5 MG: 2.5 TABLET ORAL at 21:00

## 2024-05-06 RX ADMIN — IPRATROPIUM BROMIDE AND ALBUTEROL SULFATE 3 ML: 2.5; .5 SOLUTION RESPIRATORY (INHALATION) at 09:23

## 2024-05-06 RX ADMIN — IPRATROPIUM BROMIDE AND ALBUTEROL SULFATE 3 ML: 2.5; .5 SOLUTION RESPIRATORY (INHALATION) at 17:44

## 2024-05-06 RX ADMIN — BISMUTH SUBSALICYLATE 524 MG: 262 TABLET, CHEWABLE ORAL at 09:17

## 2024-05-06 RX ADMIN — GUAIFENESIN SYRUP AND DEXTROMETHORPHAN 5 ML: 100; 10 SYRUP ORAL at 02:39

## 2024-05-06 RX ADMIN — ROFLUMILAST 500 MCG: 500 TABLET ORAL at 21:48

## 2024-05-06 RX ADMIN — PIPERACILLIN SODIUM AND TAZOBACTAM SODIUM 3.38 G: 3; .375 INJECTION, SOLUTION INTRAVENOUS at 13:56

## 2024-05-06 RX ADMIN — Medication 3 L/MIN: at 17:44

## 2024-05-06 RX ADMIN — RIVAROXABAN 15 MG: 15 TABLET, FILM COATED ORAL at 17:07

## 2024-05-06 RX ADMIN — IPRATROPIUM BROMIDE AND ALBUTEROL SULFATE 3 ML: 2.5; .5 SOLUTION RESPIRATORY (INHALATION) at 22:34

## 2024-05-06 RX ADMIN — NICOTINE 1 PATCH: 21 PATCH, EXTENDED RELEASE TRANSDERMAL at 09:08

## 2024-05-06 RX ADMIN — IPRATROPIUM BROMIDE AND ALBUTEROL SULFATE 3 ML: 2.5; .5 SOLUTION RESPIRATORY (INHALATION) at 02:43

## 2024-05-06 RX ADMIN — BUDESONIDE 0.5 MG: 0.5 INHALANT RESPIRATORY (INHALATION) at 22:34

## 2024-05-06 RX ADMIN — PIPERACILLIN SODIUM AND TAZOBACTAM SODIUM 3.38 G: 3; .375 INJECTION, SOLUTION INTRAVENOUS at 05:43

## 2024-05-06 RX ADMIN — TAMSULOSIN HYDROCHLORIDE 0.4 MG: 0.4 CAPSULE ORAL at 21:37

## 2024-05-06 RX ADMIN — GUAIFENESIN SYRUP AND DEXTROMETHORPHAN 5 ML: 100; 10 SYRUP ORAL at 11:20

## 2024-05-06 RX ADMIN — METHYLPREDNISOLONE SODIUM SUCCINATE 40 MG: 40 INJECTION, POWDER, FOR SOLUTION INTRAMUSCULAR; INTRAVENOUS at 15:02

## 2024-05-06 RX ADMIN — METHYLPREDNISOLONE SODIUM SUCCINATE 40 MG: 40 INJECTION, POWDER, FOR SOLUTION INTRAMUSCULAR; INTRAVENOUS at 05:43

## 2024-05-06 RX ADMIN — BISMUTH SUBSALICYLATE 524 MG: 262 TABLET, CHEWABLE ORAL at 17:07

## 2024-05-06 ASSESSMENT — PAIN - FUNCTIONAL ASSESSMENT
PAIN_FUNCTIONAL_ASSESSMENT: 0-10

## 2024-05-06 ASSESSMENT — COGNITIVE AND FUNCTIONAL STATUS - GENERAL: MOBILITY SCORE: 24

## 2024-05-06 ASSESSMENT — PAIN SCALES - GENERAL
PAINLEVEL_OUTOF10: 0 - NO PAIN

## 2024-05-06 NOTE — PROGRESS NOTES
"Raheel Cloud \"Raheel MOORE" is a 73 y.o. male on day 2 of admission presenting with COPD exacerbation (Multi).    Subjective   Patient seen and examined at bedside.  Patient reports that he is doing ok today. He still has ANN but doesn't feel as labored as he did when he came in. He thinks he slept solidly for about 3hrs last night. He dozed a little after that. He would like higher dose of robitussin. He had 2 episodes of diarrhea today, requested pepto, feels it is likely related to antibiotics. Daughter at bedside. All questions answered.        Objective     Physical Exam    Constitutional: Well developed, no distress, looks comfortable, calm and cooperative, looks improved today from prior   Skin: Warm and dry  Eyes: EOMI, clear sclera  ENMT: mucous membranes moist  Respiratory: improved aeration slightly diminished still, no wheezes or rhonchi noted, occasional pursed lip breathing, NC in place, wet cough, mild conversational dyspnea improving   Cardiovascular: Regular, rate and rhythm  Abdominal: Nondistended, soft, non-tender, +BS  MSK: ROM intact  Neuro: alert and oriented x3    Last Recorded Vitals  Blood pressure (!) 110/44, pulse 78, temperature 36.6 °C (97.9 °F), temperature source Temporal, resp. rate 22, height 1.753 m (5' 9\"), weight 68.5 kg (151 lb 0.2 oz), SpO2 97%.  Intake/Output last 3 Shifts:  I/O last 3 completed shifts:  In: 1751.7 (25.6 mL/kg) [P.O.:1580; I.V.:71.7 (1 mL/kg); IV Piggyback:100]  Out: 450 (6.6 mL/kg) [Urine:450 (0.2 mL/kg/hr)]  Weight: 68.5 kg     Relevant Results  Scheduled medications  budesonide, 0.5 mg, nebulization, BID  dextromethorphan-guaifenesin, 10 mL, oral, q4h  fluticasone, 2 spray, Each Nostril, BID  formoterol, 20 mcg, nebulization, BID  ipratropium-albuteroL, 3 mL, nebulization, q4h  melatonin, 5 mg, oral, Daily  methylPREDNISolone sodium succinate (PF), 40 mg, intravenous, q8h  nebivolol, 2.5 mg, oral, Daily  nicotine, 1 patch, transdermal, " Daily  piperacillin-tazobactam, 3.375 g, intravenous, q8h  rivaroxaban, 15 mg, oral, Daily with evening meal  roflumilast, 500 mcg, oral, Daily  tamsulosin, 0.4 mg, oral, Nightly      Continuous medications     PRN medications  PRN medications: acetaminophen, albuterol, ammonium lactate, benzocaine-menthol, bismuth subsalicylate, nicotine polacrilex, ondansetron, oxygen, oxygen, sodium chloride  Results from last 7 days   Lab Units 05/06/24 0758 05/05/24 0428 05/04/24  1433   WBC AUTO x10*3/uL 8.0 7.4 9.3   RBC AUTO x10*6/uL 4.15* 3.92* 4.33*   HEMOGLOBIN g/dL 12.0* 11.1* 12.6*     Results from last 7 days   Lab Units 05/06/24 0758 05/05/24  0429 05/04/24  1433   SODIUM mmol/L 140 139 141   POTASSIUM mmol/L 4.7 4.5 4.2   CHLORIDE mmol/L 101 104 103   CO2 mmol/L 33* 31 31   BUN mg/dL 17 18 15   CREATININE mg/dL 0.88 0.88 0.87   CALCIUM mg/dL 9.1 8.9 9.5   PHOSPHORUS mg/dL  --  4.1  --    MAGNESIUM mg/dL  --  3.01*  --    BILIRUBIN TOTAL mg/dL  --   --  0.8   ALT U/L  --   --  28   AST U/L  --   --  22       ECG 12 lead    Result Date: 5/5/2024  Normal sinus rhythm Rightward axis Borderline ECG When compared with ECG of 04-MAY-2024 14:34, (unconfirmed) No significant change was found Confirmed by Sedrick Myles ([id number]) on 5/5/2024 2:20:22 PM    ECG 12 lead    Result Date: 5/5/2024  Sinus rhythm with sinus arrhythmia Borderline Low voltage in frontal leads Otherwise normal ECG When compared with ECG of 20-MAY-2023 23:55, No significant change was found Confirmed by Sedrick Myles ([id number]) on 5/5/2024 2:19:50 PM    CT chest wo IV contrast    Result Date: 5/5/2024  Interpreted By:  Schoenberger, Joseph, STUDY: CT CHEST WO IV CONTRAST;  5/5/2024 8:53 am   INDICATION: Signs/Symptoms:Pneumonia vs viral bronchitis/AECOPD, hx of multiple pulmnary nodules.   COMPARISON: 05/21/2023   ACCESSION NUMBER(S): [id number]   ORDERING CLINICIAN: ROB CORREA   TECHNIQUE: Helical data acquisition of the chest was obtained   without IV contrast material.  Images were reformatted in axial, coronal, and sagittal planes.   FINDINGS: LUNGS AND AIRWAYS: The trachea and central airways are patent. No endobronchial lesion.   In the interval since the prior exam, there are multifocal peribronchovascular ground-glass opacities which are new from the prior exam. There are nonspecific though viral infectious etiology is consideration. There is no pleural effusion or pneumothorax or airspace consolidation. There are moderate 2 severe findings of some upper lung predominant centrilobular emphysema. The bulb lung nodule in the periphery of the anterolateral lower right lower lobe is stable from the prior measuring 12 mm in longitudinal dimension.   MEDIASTINUM AND CAROLYN, LOWER NECK AND AXILLA: The visualized thyroid gland is within normal limits.   No evidence of thoracic lymphadenopathy by CT criteria.   Esophagus appears within normal limits as seen.   HEART AND VESSELS: The thoracic aorta is normal in course and caliber with mild to moderate atherosclerotic calcifications.   Main pulmonary artery and its branches are normal in caliber.   There are moderate coronary atherosclerotic calcifications. The study is not optimized for evaluation of coronary arteries.   There is no enlargement of cardiac chambers.   There is trace pericardial effusion.   UPPER ABDOMEN: The images of the upper abdomen are unremarkable except for right renal cysts also apparent previously.   CHEST WALL AND OSSEOUS STRUCTURES: There are no suspicious osseous lesions. Multilevel degenerative changes are present       1.  In interval development of of peribronchial vascular ground-glass infiltrates superimposed upon emphysematous changes as detailed above. See discussion above   MACRO: None   Signed by: Joseph Schoenberger 5/5/2024 1:43 PM Dictation workstation:   [id number]    XR chest 1 view    Result Date: 5/4/2024  Interpreted By:  Alli Castellanos, STUDY: XR CHEST 1 VIEW   5/4/2024 2:46 pm   INDICATION: Signs/Symptoms:Chest Pain   COMPARISON: 05/21/2023   ACCESSION NUMBER(S): [id number]   ORDERING CLINICIAN: BESS TIWARI   TECHNIQUE: 2 AP portable radiographs of the chest were obtained   FINDINGS: Multiple cardiac monitoring leads are seen over the chest.   No focal infiltrate, pleural effusion or pneumothorax is identified. The cardiac silhouette is within normal limits for size.       No focal infiltrate or pneumothorax is identified.   MACRO: None.   Signed by: Alli Castellanos 5/4/2024 3:17 PM Dictation workstation:   [id number]      Assessment/Plan   Principal Problem:    COPD exacerbation (Multi)  Pneumonia  Acute on chronic hypoxic respiratory failure   pAfib on xarelto   Hx prostate Ca  HLD  Nicotine use     -improving  -xray imaging suspicious for pneumonia despite read  -CT chest reviewed - interval development of of peribronchial vascular ground-glass infiltrates superimposed upon emphysematous changes  -continue zosyn   -f/u MRSA  -legionella neg, strep pn neg  -f/u respiratory viral panel  -sputum culture had salivary contamination   -Bcx NG x1d  -covid/flu neg   -home O2 2L PRN, currently on 3L and PRN bipap   -continue supplemental O2 to maintain SpO2 > 90%  -acapella  -duonebs q4hrs, PRN albuterol q2hr   -budesonide/formoterol  -scheduled robitussin DM 10ml q4hr  -continue solumedrol 40mg q8hrs, anticipate transition to BID tomorrow   -flonase BID, PRN ocean nasal spray   -tele monitoring   -f/u echo  -continue home meds      FENGI   -encourage oral hydration   -replete electrolytes PRN  -regular diet   -GI ppx: n/a  -DVT ppx: SCDs, xarelto     Dispo: This is a 73-year-old male who is admitted with acute on chronic hypoxic respiratory failure secondary to COPD exacerbation and suspected pneumonia.  Currently receiving frequent breathing treatments, IV steroids and IV antibiotics.  Will likely stay greater than 2 midnights.     Mago Sales DO

## 2024-05-06 NOTE — NURSING NOTE
Pt called rn to room to have bipap mask removed. Pt placed back onto 3lpm nasal cannula. Pt requesting break from bipap. Notified patient to call rn when ready to be placed back on bipap. Call light remains in reach.

## 2024-05-06 NOTE — CARE PLAN
The patient's goals for the shift include to not be transferred to ICU.    The clinical goals for the shift include pt will remain free from respiratory distress.    Over the shift, the patient did not make progress toward the following goals. Barriers to progression include n/a. Recommendations to address these barriers include continue with respiratory treatments, steroids, antibiotics, and robitussin cough syrrup as ordered.  Pt tolerated bipap from approximately 2300 until 0230. Pt c/o sleeping ok but requesting a break from bipap. Placed on 2lpm nasal cannula per home routine, pulse ox remains >92%. Pt unable to fall back to sleep.

## 2024-05-06 NOTE — NURSING NOTE
An attempt was made to do LUNG program (COPD) education. The patient was either busy with staff or not available at this time. An additional attempt to initiate education will be made if warranted. There is a do not disturb sign as this patient is trying to get sleep. Per chart he recently lost his wife and that has taken a toll on him.

## 2024-05-06 NOTE — CARE PLAN
Problem: Discharge Planning  Goal: Discharge to home or other facility with appropriate resources  Outcome: Progressing     Problem: Chronic Conditions and Co-morbidities  Goal: Patient's chronic conditions and co-morbidity symptoms are monitored and maintained or improved  Outcome: Progressing     Problem: Respiratory  Goal: Clear secretions with interventions this shift  Outcome: Progressing  Goal: Minimal/no exertional discomfort or dyspnea this shift  Outcome: Progressing  Goal: No signs of respiratory distress (eg. Use of accessory muscles. Peds grunting)  Outcome: Progressing  Goal: Verbalize decreased shortness of breath this shift  Outcome: Progressing  Goal: Wean oxygen to maintain O2 saturation per order/standard this shift  Outcome: Progressing  Goal: Increase self care and/or family involvement in next 24 hours  Outcome: Progressing   The patient's goals for the shift include to not be transferred to ICU    The clinical goals for the shift include Pt will maintain pulse ox >92% this shift.

## 2024-05-07 ENCOUNTER — APPOINTMENT (OUTPATIENT)
Dept: PRIMARY CARE | Facility: CLINIC | Age: 74
End: 2024-05-07
Payer: MEDICARE

## 2024-05-07 LAB
ADENOVIRUS RVP, VIRC: NOT DETECTED
ANION GAP SERPL CALC-SCNC: 11 MMOL/L (ref 10–20)
BUN SERPL-MCNC: 17 MG/DL (ref 6–23)
CALCIUM SERPL-MCNC: 8.9 MG/DL (ref 8.6–10.3)
CHLORIDE SERPL-SCNC: 100 MMOL/L (ref 98–107)
CO2 SERPL-SCNC: 35 MMOL/L (ref 21–32)
CREAT SERPL-MCNC: 0.87 MG/DL (ref 0.5–1.3)
EGFRCR SERPLBLD CKD-EPI 2021: >90 ML/MIN/1.73M*2
EJECTION FRACTION APICAL 4 CHAMBER: 52.9
ENTEROVIRUS/RHINOVIRUS RVP, VIRC: NOT DETECTED
ERYTHROCYTE [DISTWIDTH] IN BLOOD BY AUTOMATED COUNT: 13.5 % (ref 11.5–14.5)
GLUCOSE SERPL-MCNC: 129 MG/DL (ref 74–99)
HCT VFR BLD AUTO: 38.9 % (ref 41–52)
HGB BLD-MCNC: 12 G/DL (ref 13.5–17.5)
HUMAN BOCAVIRUS RVP, VIRC: NOT DETECTED
HUMAN CORONAVIRUS RVP, VIRC: NOT DETECTED
INFLUENZA A , VIRC: NOT DETECTED
INFLUENZA A H1N1-09 , VIRC: NOT DETECTED
INFLUENZA B PCR, VIRC: NOT DETECTED
LEFT VENTRICLE INTERNAL DIMENSION DIASTOLE: 4.5 CM (ref 3.5–6)
MCH RBC QN AUTO: 28.8 PG (ref 26–34)
MCHC RBC AUTO-ENTMCNC: 30.8 G/DL (ref 32–36)
MCV RBC AUTO: 93 FL (ref 80–100)
METAPNEUMOVIRUS , VIRC: NOT DETECTED
MITRAL VALVE E/A RATIO: 0.71
NRBC BLD-RTO: 0 /100 WBCS (ref 0–0)
PARAINFLUENZA PCR, VIRC: NOT DETECTED
PLATELET # BLD AUTO: 228 X10*3/UL (ref 150–450)
POTASSIUM SERPL-SCNC: 4.6 MMOL/L (ref 3.5–5.3)
RBC # BLD AUTO: 4.17 X10*6/UL (ref 4.5–5.9)
RSV PCR, RVP, VIRC: NOT DETECTED
SODIUM SERPL-SCNC: 141 MMOL/L (ref 136–145)
STAPHYLOCOCCUS SPEC CULT: NORMAL
WBC # BLD AUTO: 8.8 X10*3/UL (ref 4.4–11.3)

## 2024-05-07 PROCEDURE — 99223 1ST HOSP IP/OBS HIGH 75: CPT | Performed by: INTERNAL MEDICINE

## 2024-05-07 PROCEDURE — 2500000004 HC RX 250 GENERAL PHARMACY W/ HCPCS (ALT 636 FOR OP/ED): Performed by: STUDENT IN AN ORGANIZED HEALTH CARE EDUCATION/TRAINING PROGRAM

## 2024-05-07 PROCEDURE — 2500000006 HC RX 250 W HCPCS SELF ADMINISTERED DRUGS (ALT 637 FOR ALL PAYERS): Performed by: STUDENT IN AN ORGANIZED HEALTH CARE EDUCATION/TRAINING PROGRAM

## 2024-05-07 PROCEDURE — 36415 COLL VENOUS BLD VENIPUNCTURE: CPT | Performed by: STUDENT IN AN ORGANIZED HEALTH CARE EDUCATION/TRAINING PROGRAM

## 2024-05-07 PROCEDURE — 2500000005 HC RX 250 GENERAL PHARMACY W/O HCPCS: Performed by: STUDENT IN AN ORGANIZED HEALTH CARE EDUCATION/TRAINING PROGRAM

## 2024-05-07 PROCEDURE — 82435 ASSAY OF BLOOD CHLORIDE: CPT | Performed by: STUDENT IN AN ORGANIZED HEALTH CARE EDUCATION/TRAINING PROGRAM

## 2024-05-07 PROCEDURE — 2500000001 HC RX 250 WO HCPCS SELF ADMINISTERED DRUGS (ALT 637 FOR MEDICARE OP): Performed by: STUDENT IN AN ORGANIZED HEALTH CARE EDUCATION/TRAINING PROGRAM

## 2024-05-07 PROCEDURE — S4991 NICOTINE PATCH NONLEGEND: HCPCS | Performed by: STUDENT IN AN ORGANIZED HEALTH CARE EDUCATION/TRAINING PROGRAM

## 2024-05-07 PROCEDURE — 85027 COMPLETE CBC AUTOMATED: CPT | Performed by: STUDENT IN AN ORGANIZED HEALTH CARE EDUCATION/TRAINING PROGRAM

## 2024-05-07 PROCEDURE — 99233 SBSQ HOSP IP/OBS HIGH 50: CPT | Performed by: STUDENT IN AN ORGANIZED HEALTH CARE EDUCATION/TRAINING PROGRAM

## 2024-05-07 PROCEDURE — 2060000001 HC INTERMEDIATE ICU ROOM DAILY

## 2024-05-07 PROCEDURE — 2500000002 HC RX 250 W HCPCS SELF ADMINISTERED DRUGS (ALT 637 FOR MEDICARE OP, ALT 636 FOR OP/ED): Performed by: STUDENT IN AN ORGANIZED HEALTH CARE EDUCATION/TRAINING PROGRAM

## 2024-05-07 PROCEDURE — 94640 AIRWAY INHALATION TREATMENT: CPT

## 2024-05-07 RX ORDER — BENZONATATE 100 MG/1
100 CAPSULE ORAL 3 TIMES DAILY PRN
Status: DISCONTINUED | OUTPATIENT
Start: 2024-05-07 | End: 2024-05-12 | Stop reason: HOSPADM

## 2024-05-07 RX ORDER — GUAIFENESIN/DEXTROMETHORPHAN 100-10MG/5
10 SYRUP ORAL EVERY 4 HOURS PRN
Status: DISCONTINUED | OUTPATIENT
Start: 2024-05-07 | End: 2024-05-12 | Stop reason: HOSPADM

## 2024-05-07 RX ORDER — GUAIFENESIN/DEXTROMETHORPHAN 100-10MG/5
10 SYRUP ORAL EVERY 4 HOURS PRN
Status: DISCONTINUED | OUTPATIENT
Start: 2024-05-07 | End: 2024-05-07

## 2024-05-07 RX ORDER — IPRATROPIUM BROMIDE AND ALBUTEROL SULFATE 2.5; .5 MG/3ML; MG/3ML
3 SOLUTION RESPIRATORY (INHALATION) EVERY 4 HOURS PRN
Status: DISCONTINUED | OUTPATIENT
Start: 2024-05-07 | End: 2024-05-08

## 2024-05-07 RX ORDER — IPRATROPIUM BROMIDE AND ALBUTEROL SULFATE 2.5; .5 MG/3ML; MG/3ML
3 SOLUTION RESPIRATORY (INHALATION) 4 TIMES DAILY
Status: DISCONTINUED | OUTPATIENT
Start: 2024-05-08 | End: 2024-05-08

## 2024-05-07 RX ORDER — LOPERAMIDE HYDROCHLORIDE 2 MG/1
2 CAPSULE ORAL 4 TIMES DAILY PRN
Status: DISCONTINUED | OUTPATIENT
Start: 2024-05-07 | End: 2024-05-12 | Stop reason: HOSPADM

## 2024-05-07 RX ADMIN — IPRATROPIUM BROMIDE AND ALBUTEROL SULFATE 3 ML: 2.5; .5 SOLUTION RESPIRATORY (INHALATION) at 05:22

## 2024-05-07 RX ADMIN — BENZOCAINE AND MENTHOL 1 LOZENGE: 15; 3.6 LOZENGE ORAL at 18:59

## 2024-05-07 RX ADMIN — BUDESONIDE 0.5 MG: 0.5 INHALANT RESPIRATORY (INHALATION) at 08:45

## 2024-05-07 RX ADMIN — IPRATROPIUM BROMIDE AND ALBUTEROL SULFATE 3 ML: 2.5; .5 SOLUTION RESPIRATORY (INHALATION) at 01:40

## 2024-05-07 RX ADMIN — METHYLPREDNISOLONE SODIUM SUCCINATE 40 MG: 40 INJECTION, POWDER, FOR SOLUTION INTRAMUSCULAR; INTRAVENOUS at 18:53

## 2024-05-07 RX ADMIN — BUDESONIDE 0.5 MG: 0.5 INHALANT RESPIRATORY (INHALATION) at 21:00

## 2024-05-07 RX ADMIN — IPRATROPIUM BROMIDE AND ALBUTEROL SULFATE 3 ML: 2.5; .5 SOLUTION RESPIRATORY (INHALATION) at 13:15

## 2024-05-07 RX ADMIN — FLUTICASONE PROPIONATE 2 SPRAY: 50 SPRAY, METERED NASAL at 21:18

## 2024-05-07 RX ADMIN — TAMSULOSIN HYDROCHLORIDE 0.4 MG: 0.4 CAPSULE ORAL at 21:18

## 2024-05-07 RX ADMIN — METHYLPREDNISOLONE SODIUM SUCCINATE 40 MG: 40 INJECTION, POWDER, FOR SOLUTION INTRAMUSCULAR; INTRAVENOUS at 06:26

## 2024-05-07 RX ADMIN — RIVAROXABAN 15 MG: 15 TABLET, FILM COATED ORAL at 18:53

## 2024-05-07 RX ADMIN — NEBIVOLOL HYDROCHLORIDE 2.5 MG: 2.5 TABLET ORAL at 21:18

## 2024-05-07 RX ADMIN — FLUTICASONE PROPIONATE 2 SPRAY: 50 SPRAY, METERED NASAL at 08:45

## 2024-05-07 RX ADMIN — Medication 2 L/MIN: at 19:47

## 2024-05-07 RX ADMIN — LOPERAMIDE HYDROCHLORIDE 2 MG: 2 CAPSULE ORAL at 05:21

## 2024-05-07 RX ADMIN — Medication 2.5 L/MIN: at 13:15

## 2024-05-07 RX ADMIN — CEFTRIAXONE SODIUM 2 G: 2 INJECTION, SOLUTION INTRAVENOUS at 21:17

## 2024-05-07 RX ADMIN — IPRATROPIUM BROMIDE AND ALBUTEROL SULFATE 3 ML: 2.5; .5 SOLUTION RESPIRATORY (INHALATION) at 08:45

## 2024-05-07 RX ADMIN — NICOTINE 1 PATCH: 21 PATCH, EXTENDED RELEASE TRANSDERMAL at 08:45

## 2024-05-07 RX ADMIN — IPRATROPIUM BROMIDE AND ALBUTEROL SULFATE 3 ML: 2.5; .5 SOLUTION RESPIRATORY (INHALATION) at 21:00

## 2024-05-07 RX ADMIN — SALINE NASAL SPRAY 1 SPRAY: 1.5 SOLUTION NASAL at 21:57

## 2024-05-07 RX ADMIN — LOPERAMIDE HYDROCHLORIDE 2 MG: 2 CAPSULE ORAL at 15:23

## 2024-05-07 RX ADMIN — ROFLUMILAST 500 MCG: 500 TABLET ORAL at 21:18

## 2024-05-07 ASSESSMENT — COGNITIVE AND FUNCTIONAL STATUS - GENERAL
MOBILITY SCORE: 24
DAILY ACTIVITIY SCORE: 24

## 2024-05-07 ASSESSMENT — PAIN - FUNCTIONAL ASSESSMENT
PAIN_FUNCTIONAL_ASSESSMENT: 0-10

## 2024-05-07 ASSESSMENT — PAIN SCALES - GENERAL
PAINLEVEL_OUTOF10: 0 - NO PAIN

## 2024-05-07 NOTE — CARE PLAN
Problem: Discharge Planning  Goal: Discharge to home or other facility with appropriate resources  Outcome: Progressing     Problem: Chronic Conditions and Co-morbidities  Goal: Patient's chronic conditions and co-morbidity symptoms are monitored and maintained or improved  Outcome: Progressing     Problem: Respiratory  Goal: Clear secretions with interventions this shift  Outcome: Progressing  Goal: Minimal/no exertional discomfort or dyspnea this shift  Outcome: Progressing  Goal: No signs of respiratory distress (eg. Use of accessory muscles. Peds grunting)  Outcome: Progressing  Goal: Tolerate mechanical ventilation evidenced by VS/agitation level this shift  Outcome: Progressing  Goal: Tolerate pulmonary toileting this shift  Outcome: Progressing  Goal: Verbalize decreased shortness of breath this shift  Outcome: Progressing  Goal: Wean oxygen to maintain O2 saturation per order/standard this shift  Outcome: Progressing  Goal: Increase self care and/or family involvement in next 24 hours  Outcome: Progressing   The patient's goals for the shift include to not be transferred to ICU    The clinical goals for the shift include Pt will maintain pulse ox >92% this shift.

## 2024-05-07 NOTE — DOCUMENTATION CLARIFICATION NOTE
"    PATIENT:               KENNA BRENNAN  ACCT #:                  [id number]  MRN:                       [id number]  :                       1950  ADMIT DATE:       2024 2:26 PM  DISCH DATE:  RESPONDING PROVIDER #:        [id number]          PROVIDER RESPONSE TEXT:    concern for gram negative pna    CDI QUERY TEXT:    Clarification    Instruction:    Based on your assessment of the patient and the clinical information, please provide the requested documentation by clicking on the appropriate radio button and enter any additional information if prompted.    Question: Please further specify the type of pneumonia being treated    When answering this query, please exercise your independent professional judgment. The fact that a question is being asked, does not imply that any particular answer is desired or expected.    The patient's clinical indicators include:  Clinical Information:  73M presents for SOB    Clinical Indicators:  - Strep. Pneumo/Legionella, : negative  - Flu/COVID, : negative  - Sputum, : contaminated  - CT chest, : In interval development of of peribronchial vascular ground-glass infiltrates superimposed upon emphysematous changes  - , , Alley: \"admitted for acute on chronic hypoxic respiratory failure secondary to COPD exacerbation with suspected pneumonia...recently staying in the hospital with family, will broaden coverage -start zosyn...home O2 2L PRN, currently on 3L and PRN bipap\"    Treatment:  Zosyn, IVF, CXR, CT chest, sputum    Risk Factors:  COPD, BiPAP, Chronic Resp Failure  Options provided:  -- Gram Negative PNA  -- Viral PNA  -- Other - I will add my own diagnosis  -- Refer to Clinical Documentation Reviewer    Query created by: Lorraine Meehan on 2024 9:56 AM      Electronically signed by:  KADIE LAWTON DO 2024 11:24 AM          "

## 2024-05-07 NOTE — PROGRESS NOTES
"Raheel Cloud \"Raheel MOORE" is a 73 y.o. male on day 3 of admission presenting with COPD exacerbation (Multi).    Subjective   Patient seen and examined at bedside.  Patient reports that he is doing fine today. He reports he was up with frequent diarrhea overnight. He states he took some imodium and hopes that will help a bit. He does not believe it is cdif and it is not watery but is explosive. He agrees it is likely related to antibiotic use. He reports his breathing is better and he is talking more and trying to be more active in the room. He still believes he is pursed lip breathing when he ambulates but not while at rest. He reports phlegm and cough worse when he lays down and would like to stop the robitussin now. He will consider switching to mucinex and is open to trying tessalon perles as needed. We discuss tele monitoring and echo results as well.        Objective     Physical Exam  Constitutional: Well developed, no distress, calm and cooperative, looks better today  Skin: Warm and dry  Eyes: EOMI, clear sclera  ENMT: mucous membranes moist  Respiratory: improved aeration slightly diminished still R>L, no wheezes or rhonchi noted, NC in place, wet cough, conversational dyspnea much improved and has long sentences only interrupted with occasional cough  Cardiovascular: Regular, rate and rhythm  Abdominal: Nondistended, soft, non-tender, +BS  MSK: ROM intact, 2+ pitting LE edema up to mid calf (1+ up to knee) with venous stasis skin changes  Neuro: alert and oriented x3    Last Recorded Vitals  Blood pressure 95/55, pulse 72, temperature 36.3 °C (97.3 °F), temperature source Temporal, resp. rate 20, height 1.753 m (5' 9\"), weight 66.7 kg (147 lb 0.8 oz), SpO2 94%.  Intake/Output last 3 Shifts:  I/O last 3 completed shifts:  In: 1130 (16.9 mL/kg) [P.O.:980; IV Piggyback:150]  Out: - (0 mL/kg)   Weight: 66.7 kg     Relevant Results  Scheduled medications  budesonide, 0.5 mg, nebulization, BID  cefTRIAXone, 2 g, " intravenous, q24h  fluticasone, 2 spray, Each Nostril, BID  ipratropium-albuteroL, 3 mL, nebulization, q4h  melatonin, 5 mg, oral, Daily  methylPREDNISolone sodium succinate (PF), 40 mg, intravenous, q12h  nebivolol, 2.5 mg, oral, Daily  nicotine, 1 patch, transdermal, Daily  rivaroxaban, 15 mg, oral, Daily with evening meal  roflumilast, 500 mcg, oral, Daily  tamsulosin, 0.4 mg, oral, Nightly      Continuous medications     PRN medications  PRN medications: acetaminophen, albuterol, ammonium lactate, benzocaine-menthol, benzonatate, bismuth subsalicylate, dextromethorphan-guaifenesin, loperamide, nicotine polacrilex, ondansetron, oxygen, oxygen, sodium chloride  Results from last 7 days   Lab Units 05/07/24  0741 05/06/24  0758 05/05/24  0428   WBC AUTO x10*3/uL 8.8 8.0 7.4   RBC AUTO x10*6/uL 4.17* 4.15* 3.92*   HEMOGLOBIN g/dL 12.0* 12.0* 11.1*     Results from last 7 days   Lab Units 05/07/24  0741 05/06/24  0758 05/05/24  0429 05/04/24  1433   SODIUM mmol/L 141 140 139 141   POTASSIUM mmol/L 4.6 4.7 4.5 4.2   CHLORIDE mmol/L 100 101 104 103   CO2 mmol/L 35* 33* 31 31   BUN mg/dL 17 17 18 15   CREATININE mg/dL 0.87 0.88 0.88 0.87   CALCIUM mg/dL 8.9 9.1 8.9 9.5   PHOSPHORUS mg/dL  --   --  4.1  --    MAGNESIUM mg/dL  --   --  3.01*  --    BILIRUBIN TOTAL mg/dL  --   --   --  0.8   ALT U/L  --   --   --  28   AST U/L  --   --   --  22       Transthoracic Echo (TTE) Limited    Result Date: 5/7/2024            [facility] [address] [facility] [address]    Tel [phone] Fax [phone] TRANSTHORACIC ECHOCARDIOGRAM REPORT  Patient Name:     KENNA MIKA Pardo Physician:   [id number] Paul Ibrahim MD Study Date:       5/6/2024             Ordering Provider:   [id number] KADIE LAWTON MRN/PID:          [id number]             Fellow: Accession#:       [id number]         Nurse:  Date of           1950 / 73 years Sonographer:         Cielo Myers RDCS Birth/Age: Gender:           M                    Additional Staff: Height:           175.26 cm            Admit Date: Weight:           68.49 kg             Admission Status:    Inpatient - Routine BSA / BMI:        1.83 m2 / 22.30      Department Location: [facility] Echo Lab                   kg/m2 Blood Pressure: 132 /57 mmHg Study Type:    TRANSTHORACIC ECHO (TTE) LIMITED Diagnosis/ICD: Supraventricular tachycardia-I47.1 Indication:    Vtach CPT Codes:     Echo Limited-[id number]  Study Detail: The following Echo studies were performed: 2D, Doppler and color               flow. Technically challenging study due to poor acoustic windows               and prominent lung artifact.  PHYSICIAN INTERPRETATION: Left Ventricle: Left ventricular systolic function is normal, with an estimated ejection fraction of 55%. Wall motion is abnormal. The left ventricular cavity size is normal. Spectral Doppler shows an impaired relaxation pattern of left ventricular diastolic filling. LV Wall Scoring: The basal and mid inferior septum and mid anteroseptal segment are hypokinetic. All remaining scored segments are normal. Left Atrium: The left atrium is normal in size. Right Ventricle: The right ventricle is normal in size. There is normal right ventricular global systolic function. Right Atrium: The right atrium is normal in size. Aortic Valve: The aortic valve is trileaflet. Aortic valve regurgitation was not assessed. Mitral Valve: The mitral valve is normal in structure. There is trace mitral valve regurgitation. Tricuspid Valve: The tricuspid valve is structurally normal. There is trace tricuspid regurgitation. Pulmonic Valve: The pulmonic valve is not well visualized. The pulmonic valve regurgitation was not assessed. Pericardium: There is a trivial pericardial effusion. Aorta: The aortic root is normal. Systemic Veins: The inferior vena cava appears to be  of normal size. There is IVC inspiratory collapse greater than 50%.  CONCLUSIONS:  1. Left ventricular systolic function is normal with a 55% estimated ejection fraction.  2. Basal and mid inferior septum and mid anteroseptal segment are abnormal.  3. Spectral Doppler shows an impaired relaxation pattern of left ventricular diastolic filling. QUANTITATIVE DATA SUMMARY: 2D MEASUREMENTS:                           Normal Ranges: IVSd:          0.79 cm    (0.6-1.1cm) LVPWd:         1.15 cm    (0.6-1.1cm) LVIDd:         4.50 cm    (3.9-5.9cm) LV Mass Index: 119.8 g/m2 LV SYSTOLIC FUNCTION BY 2D PLANIMETRY (MOD):                     Normal Ranges: EF-A4C View: 52.9 % (>=55%) LV DIASTOLIC FUNCTION:                     Normal Ranges: MV Peak E: 0.83 m/s (0.7-1.2 m/s) MV Peak A: 1.16 m/s (0.42-0.7 m/s) E/A Ratio: 0.71     (1.0-2.2) MITRAL VALVE:                 Normal Ranges: MV DT: 134 msec (150-240msec)  [id number] Paul Ibrahim MD Electronically signed on 5/7/2024 at 8:59:49 AM  Wall Scoring  ** Final **     Electrocardiogram, 12-lead PRN ACS symptoms    Result Date: 5/6/2024  Normal sinus rhythm Normal ECG When compared with ECG of 04-MAY-2024 15:51, No significant change was found      Assessment/Plan   Principal Problem:    COPD exacerbation (Multi)  Pneumonia  Acute on chronic hypoxic respiratory failure   Diarrhea   pAfib on xarelto   Hx prostate Ca  HLD  Nicotine use     -improving  -xray imaging suspicious for pneumonia despite read  -CT chest reviewed - interval development of of peribronchial vascular ground-glass infiltrates superimposed upon emphysematous changes  -deescalate abx to rocephin  -legionella neg, strep pn neg, MRSA neg   -f/u respiratory viral panel (spoke with microbiology, panel was a send out, will continue to watch for results)  -sputum culture had salivary contamination   -Bcx NG x2d  -covid/flu neg   -home O2 2L PRN, currently on 2L and PRN bipap   -continue supplemental O2 to maintain SpO2 >  90%  -acapella  -duonebs q4hrs, PRN albuterol q2hr   -budesonide  -d/c formoterol  -robitussin DM 10ml q4hr PRN  -tessalon perles PRN   -solumedrol 40mg q12hrs    -flonase BID, PRN ocean nasal spray   -tele monitoring   -echo - EF 55%, diastolic dysfunction, basal, mid inferior septum and mid anteroseptal segment are hypokinetic   -diarrhea, likely related to zosyn, which has now been d/c'd  -continue PRN pepto or imodium   -continue home meds      FENGI   -encourage oral hydration   -replete electrolytes PRN  -regular diet   -GI ppx: n/a  -DVT ppx: SCDs, xarelto     Dispo: This is a 73-year-old male who is admitted with acute on chronic hypoxic respiratory failure secondary to COPD exacerbation and suspected pneumonia.  Currently receiving frequent breathing treatments, IV steroids and IV antibiotics.  Will likely stay greater than 2 midnights.     Mago Sales DO

## 2024-05-07 NOTE — PROGRESS NOTES
I spoke with this patient about COPD and the benefits of self-management. The patient is aware that this is a guideline and does not replace medical advice from their physician. We discussed pursed-lip and diaphragmatic breathing, recognizing their personal yellow zone, and reviewed the  Living Well With COPD book.   Is this a 30 re-admission?   Smoking cessation? yes  Wears home oxygen? At night  Does the patient know why they are here? yes  Most recent PFT:     This patient is very pleasant to speak with and very knowledgeable about his condition. He does have some desire to quit smoking. He is interested in pulmonary rehab as well. 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

## 2024-05-07 NOTE — CONSULTS
Pulmonary Disease Consult    PATIENT NAME: Raheel Cloud    MRN: [id number]  SERVICE DATE:  5/7/2024   SERVICE TIME:  4:28 PM        PRIMARY CARE PHYSICIAN: Silvestre Rock DO  REASON FOR CONSULT: severe copd exacerbation  REQUESTING PHYSICIAN: Mónica        ASSESSMENT  PLAN:  Acute on chronic hypoxic hypercapnic respiratory failure    COPD and bronchiectasis exacerbation (Multi), around stage IV COPD  History of bronchiectasis and chronic bronchitis on home treatment with vest therapy and bronchodilators.  Atypical/viral ?pneumonia  Nicotine use  Diarrhea after antibiotics  pAfib on xarelto   Card arrhythmia      IMPRESSION:  1.  In interval development of of peribronchial vascular ground-glass  infiltrates superimposed upon emphysematous changes as detailed    Patient had almost severe exacerbation to the point he was tripoding to breathe initially on admission, had variable tolerance to the BiPAP with the work of breathing and increased sputum production.  Antibiotics and high doses of steroids were used between ceftriaxone and Zosyn, in addition to Solu-Medrol every 8 hourly.  He thinks his diarrhea is causing him also generalized tiredness and weakness and is most likely due to  the use of Zosyn, he is refusing to have Zosyn anymore.  To be resumed back on ceftriaxone daily as tolerated  Agree with the current prednisone treatment of PID 40 and continue with bronchodilators.  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  He is also known with chronic hypoxic respiratory failure on home oxygen 2 to 3 L/min, using vest regularly for sputum induction, and nebulizer therapy with DuoNeb and budesonide.  Unfortunately he lost his wife around 2 weeks ago after an acute critical illness.  He is also been under the workup for cardiac arrhythmia and cardiac evaluation.  He was admitted with worsening respiratory status with shortness of breath chest tightness work creased work of breathing despite being on oxygen after exposure to viral illness with one of his grandchildren.    He is to start feeling some improvement he denies any fever chills or hemoptysis  Still having difficulty with expectoration although able to give a sputum sample, usually he uses his vest at home  Mild leg swelling he thinks after the steroid use.  Been having also loose stools for the last few days but he thinks it is worse after the use of Zosyn          PAST MEDICAL HISTORY:   Past Medical History:   Diagnosis Date    Personal history of other diseases of the respiratory system 06/23/2021    History of chronic obstructive lung disease     PAST SURGICAL HISTORY:   Past Surgical History:   Procedure Laterality Date    OTHER SURGICAL HISTORY  09/24/2020    Umbilical hernia repair laparoscopic    OTHER SURGICAL HISTORY  09/24/2020    Peritoneal adhesiolysis     FAMILY HISTORY: No family history on file.  SOCIAL HISTORY:   Social History     Tobacco Use    Smoking status: Every Day     Current packs/day: 2.00     Types: Cigarettes    Smokeless tobacco: Never   Vaping Use    Vaping status: Never Used   Substance Use Topics    Alcohol use: Not Currently    Drug use: Not Currently     CURRENT ALLERGIES:   Allergies as of 05/04/2024    (No Known  Allergies)       MEDICATIONS:  Scheduled medications  budesonide, 0.5 mg, nebulization, BID  cefTRIAXone, 2 g, intravenous, q24h  fluticasone, 2 spray, Each Nostril, BID  ipratropium-albuteroL, 3 mL, nebulization, q4h  melatonin, 5 mg, oral, Daily  methylPREDNISolone sodium succinate (PF), 40 mg, intravenous, q12h  nebivolol, 2.5 mg, oral, Daily  nicotine, 1 patch, transdermal, Daily  rivaroxaban, 15 mg, oral, Daily with evening meal  roflumilast, 500 mcg, oral, Daily  tamsulosin, 0.4 mg, oral, Nightly      Continuous medications     PRN medications  PRN medications: acetaminophen, albuterol, ammonium lactate, benzocaine-menthol, benzonatate, bismuth subsalicylate, dextromethorphan-guaifenesin, loperamide, nicotine polacrilex, ondansetron, oxygen, oxygen, sodium chloride      ROS  Review of Systems      Physical Exam  Vitals reviewed.   Constitutional:       Appearance: Normal appearance.   HENT:      Head: Normocephalic and atraumatic.   Eyes:      Extraocular Movements: Extraocular movements intact.      Pupils: Pupils are equal, round, and reactive to light.   Cardiovascular:      Rate and Rhythm: Normal rate and regular rhythm.      Heart sounds: No murmur heard.  Pulmonary:      Effort: Decreased breath sounds bilateral and mild respiratory distress present.      Breath sounds: No stridor. Wheezing and rhonchi present.   Chest:      Chest wall: No tenderness.   Abdominal:      General: Bowel sounds are normal. There is no distension.      Palpations: There is no mass.      Tenderness: There is no abdominal tenderness. There is no rebound.   Musculoskeletal:         General: Bilateral leg swelling present. No tenderness or deformity.      Cervical back: No rigidity.   Lymphadenopathy:      Cervical: No cervical adenopathy.   Skin:     General: Skin is warm.      Coloration: Skin is pale. Skin is not jaundiced.      Findings: No bruising.   Neurological:      General: No focal deficit present.      Mental Status:  She is alert and oriented to person, place, and time. Mental status is at baseline.      Cranial Nerves: No cranial nerve deficit.      Motor: Weakness present.   Psychiatric:         Mood and Affect: Mood normal.         Behavior: Behavior normal.     Patient Vitals for the past 24 hrs:   BP Temp Temp src Pulse Resp SpO2 Weight   05/07/24 1600 112/53 36.2 °C (97.2 °F) Temporal 78 -- 94 % --   05/07/24 1500 -- -- -- 78 -- 97 % --   05/07/24 1400 -- -- -- 82 -- 95 % --   05/07/24 1300 -- -- -- 82 -- 95 % --   05/07/24 1220 125/50 36.6 °C (97.9 °F) Temporal 74 -- 94 % --   05/07/24 1215 -- -- -- 78 -- 93 % --   05/07/24 1200 -- -- -- 80 -- 93 % --   05/07/24 1100 -- -- -- 80 -- 93 % --   05/07/24 1000 -- -- -- 84 -- 94 % --   05/07/24 0900 95/55 -- -- 72 -- 94 % --   05/07/24 0845 (!) 106/36 36.3 °C (97.3 °F) Temporal 74 -- 92 % --   05/07/24 0800 -- -- -- 78 -- 93 % --   05/07/24 0700 -- -- -- 74 -- 97 % --   05/07/24 0600 -- -- -- 78 -- 97 % --   05/07/24 0400 (!) 114/49 36.6 °C (97.9 °F) Temporal 72 20 98 % 66.7 kg (147 lb 0.8 oz)   05/07/24 0000 (!) 114/43 36 °C (96.8 °F) Temporal 70 20 98 % --   05/06/24 2000 131/51 36.4 °C (97.5 °F) Temporal 76 20 96 % --     Body mass index is 21.72 kg/m².      Gen: NAD  Neck: symmetric, no mass  Cardiovascular: RRR  Respiratory: No distress   GI: Abd soft, nontender, non-distended  Extremities: no leg edema  Skin: Warm and dry.  Neuro: alert and oriented times 3  : no carias     Labs:  Lab Results   Component Value Date    WBC 8.8 05/07/2024    HGB 12.0 (L) 05/07/2024    HCT 38.9 (L) 05/07/2024    MCV 93 05/07/2024     05/07/2024     Lab Results   Component Value Date    GLUCOSE 129 (H) 05/07/2024    CALCIUM 8.9 05/07/2024     05/07/2024    K 4.6 05/07/2024    CO2 35 (H) 05/07/2024     05/07/2024    BUN 17 05/07/2024    CREATININE 0.87 05/07/2024   ESR: --  Lab Results   Component Value Date    SEDRATE 11 05/05/2023     Lab Results   Component Value Date     CRP 11.87 (A) 06/10/2020     Lab Results   Component Value Date    ALT 28 05/04/2024    AST 22 05/04/2024    ALKPHOS 48 05/04/2024    BILITOT 0.8 05/04/2024   @ABG@      DATA:   Diagnostic tests reviewed for today's visit:    Labs this admission reviewed  Imagings this admission reviewed  Cultures: Reviewed    Transthoracic Echo (TTE) Limited   Final Result      CT chest wo IV contrast   Final Result   1.  In interval development of of peribronchial vascular ground-glass   infiltrates superimposed upon emphysematous changes as detailed   above. See discussion above        MACRO:   None        Signed by: Joseph Schoenberger 5/5/2024 1:43 PM   Dictation workstation:   [id number]      XR chest 1 view   Final Result   No focal infiltrate or pneumothorax is identified.        MACRO:   None.        Signed by: Alli Castellanos 5/4/2024 3:17 PM   Dictation workstation:   [id number]               Will continue to follow     Thank you so much for this consultation     Aracelis Velazquez MD

## 2024-05-08 LAB
ANION GAP SERPL CALC-SCNC: 8 MMOL/L (ref 10–20)
ATRIAL RATE: 85 BPM
BUN SERPL-MCNC: 17 MG/DL (ref 6–23)
CALCIUM SERPL-MCNC: 8.4 MG/DL (ref 8.6–10.3)
CHLORIDE SERPL-SCNC: 100 MMOL/L (ref 98–107)
CO2 SERPL-SCNC: 38 MMOL/L (ref 21–32)
CREAT SERPL-MCNC: 0.8 MG/DL (ref 0.5–1.3)
EGFRCR SERPLBLD CKD-EPI 2021: >90 ML/MIN/1.73M*2
GLUCOSE SERPL-MCNC: 106 MG/DL (ref 74–99)
P AXIS: 79 DEGREES
P OFFSET: 219 MS
P ONSET: 163 MS
POTASSIUM SERPL-SCNC: 3.9 MMOL/L (ref 3.5–5.3)
PR INTERVAL: 132 MS
Q ONSET: 229 MS
QRS COUNT: 14 BEATS
QRS DURATION: 80 MS
QT INTERVAL: 382 MS
QTC CALCULATION(BAZETT): 454 MS
QTC FREDERICIA: 429 MS
R AXIS: 88 DEGREES
SODIUM SERPL-SCNC: 142 MMOL/L (ref 136–145)
T AXIS: 63 DEGREES
T OFFSET: 420 MS
VENTRICULAR RATE: 85 BPM

## 2024-05-08 PROCEDURE — 2060000001 HC INTERMEDIATE ICU ROOM DAILY

## 2024-05-08 PROCEDURE — 2500000001 HC RX 250 WO HCPCS SELF ADMINISTERED DRUGS (ALT 637 FOR MEDICARE OP): Performed by: STUDENT IN AN ORGANIZED HEALTH CARE EDUCATION/TRAINING PROGRAM

## 2024-05-08 PROCEDURE — S4991 NICOTINE PATCH NONLEGEND: HCPCS | Performed by: STUDENT IN AN ORGANIZED HEALTH CARE EDUCATION/TRAINING PROGRAM

## 2024-05-08 PROCEDURE — 2500000002 HC RX 250 W HCPCS SELF ADMINISTERED DRUGS (ALT 637 FOR MEDICARE OP, ALT 636 FOR OP/ED): Performed by: STUDENT IN AN ORGANIZED HEALTH CARE EDUCATION/TRAINING PROGRAM

## 2024-05-08 PROCEDURE — 80048 BASIC METABOLIC PNL TOTAL CA: CPT | Performed by: STUDENT IN AN ORGANIZED HEALTH CARE EDUCATION/TRAINING PROGRAM

## 2024-05-08 PROCEDURE — 99233 SBSQ HOSP IP/OBS HIGH 50: CPT | Performed by: STUDENT IN AN ORGANIZED HEALTH CARE EDUCATION/TRAINING PROGRAM

## 2024-05-08 PROCEDURE — 94660 CPAP INITIATION&MGMT: CPT

## 2024-05-08 PROCEDURE — 2500000005 HC RX 250 GENERAL PHARMACY W/O HCPCS: Performed by: STUDENT IN AN ORGANIZED HEALTH CARE EDUCATION/TRAINING PROGRAM

## 2024-05-08 PROCEDURE — 2500000006 HC RX 250 W HCPCS SELF ADMINISTERED DRUGS (ALT 637 FOR ALL PAYERS): Performed by: STUDENT IN AN ORGANIZED HEALTH CARE EDUCATION/TRAINING PROGRAM

## 2024-05-08 PROCEDURE — 36415 COLL VENOUS BLD VENIPUNCTURE: CPT | Performed by: STUDENT IN AN ORGANIZED HEALTH CARE EDUCATION/TRAINING PROGRAM

## 2024-05-08 PROCEDURE — 2500000002 HC RX 250 W HCPCS SELF ADMINISTERED DRUGS (ALT 637 FOR MEDICARE OP, ALT 636 FOR OP/ED)

## 2024-05-08 PROCEDURE — 94640 AIRWAY INHALATION TREATMENT: CPT

## 2024-05-08 PROCEDURE — 2500000001 HC RX 250 WO HCPCS SELF ADMINISTERED DRUGS (ALT 637 FOR MEDICARE OP)

## 2024-05-08 PROCEDURE — 99233 SBSQ HOSP IP/OBS HIGH 50: CPT

## 2024-05-08 PROCEDURE — 2500000004 HC RX 250 GENERAL PHARMACY W/ HCPCS (ALT 636 FOR OP/ED): Performed by: STUDENT IN AN ORGANIZED HEALTH CARE EDUCATION/TRAINING PROGRAM

## 2024-05-08 RX ORDER — IPRATROPIUM BROMIDE AND ALBUTEROL SULFATE 2.5; .5 MG/3ML; MG/3ML
SOLUTION RESPIRATORY (INHALATION)
Status: COMPLETED
Start: 2024-05-08 | End: 2024-05-08

## 2024-05-08 RX ORDER — IPRATROPIUM BROMIDE AND ALBUTEROL SULFATE 2.5; .5 MG/3ML; MG/3ML
3 SOLUTION RESPIRATORY (INHALATION)
Status: DISCONTINUED | OUTPATIENT
Start: 2024-05-08 | End: 2024-05-08

## 2024-05-08 RX ORDER — PANTOPRAZOLE SODIUM 40 MG/1
40 TABLET, DELAYED RELEASE ORAL
Status: DISCONTINUED | OUTPATIENT
Start: 2024-05-08 | End: 2024-05-12 | Stop reason: HOSPADM

## 2024-05-08 RX ORDER — ALBUTEROL SULFATE 0.83 MG/ML
2.5 SOLUTION RESPIRATORY (INHALATION) EVERY 8 HOURS
Status: DISCONTINUED | OUTPATIENT
Start: 2024-05-08 | End: 2024-05-12 | Stop reason: HOSPADM

## 2024-05-08 RX ORDER — IPRATROPIUM BROMIDE AND ALBUTEROL SULFATE 2.5; .5 MG/3ML; MG/3ML
3 SOLUTION RESPIRATORY (INHALATION)
Status: DISCONTINUED | OUTPATIENT
Start: 2024-05-08 | End: 2024-05-12 | Stop reason: HOSPADM

## 2024-05-08 RX ORDER — ALBUTEROL SULFATE 0.83 MG/ML
2.5 SOLUTION RESPIRATORY (INHALATION)
Status: DISCONTINUED | OUTPATIENT
Start: 2024-05-08 | End: 2024-05-08

## 2024-05-08 RX ADMIN — LOPERAMIDE HYDROCHLORIDE 2 MG: 2 CAPSULE ORAL at 20:23

## 2024-05-08 RX ADMIN — Medication 1 CAPSULE: at 20:23

## 2024-05-08 RX ADMIN — LOPERAMIDE HYDROCHLORIDE 2 MG: 2 CAPSULE ORAL at 06:04

## 2024-05-08 RX ADMIN — PANTOPRAZOLE SODIUM 40 MG: 40 TABLET, DELAYED RELEASE ORAL at 17:01

## 2024-05-08 RX ADMIN — IPRATROPIUM BROMIDE AND ALBUTEROL SULFATE 3 ML: 2.5; .5 SOLUTION RESPIRATORY (INHALATION) at 16:49

## 2024-05-08 RX ADMIN — NEBIVOLOL HYDROCHLORIDE 2.5 MG: 2.5 TABLET ORAL at 20:23

## 2024-05-08 RX ADMIN — Medication 2.5 L/MIN: at 19:52

## 2024-05-08 RX ADMIN — RIVAROXABAN 15 MG: 15 TABLET, FILM COATED ORAL at 17:01

## 2024-05-08 RX ADMIN — IPRATROPIUM BROMIDE AND ALBUTEROL SULFATE 3 ML: 2.5; .5 SOLUTION RESPIRATORY (INHALATION) at 08:50

## 2024-05-08 RX ADMIN — ALBUTEROL SULFATE 2.5 MG: 2.5 SOLUTION RESPIRATORY (INHALATION) at 21:08

## 2024-05-08 RX ADMIN — TAMSULOSIN HYDROCHLORIDE 0.4 MG: 0.4 CAPSULE ORAL at 20:23

## 2024-05-08 RX ADMIN — NICOTINE 1 PATCH: 21 PATCH, EXTENDED RELEASE TRANSDERMAL at 08:43

## 2024-05-08 RX ADMIN — LOPERAMIDE HYDROCHLORIDE 2 MG: 2 CAPSULE ORAL at 12:46

## 2024-05-08 RX ADMIN — Medication 2.5 L/MIN: at 08:00

## 2024-05-08 RX ADMIN — FLUTICASONE PROPIONATE 2 SPRAY: 50 SPRAY, METERED NASAL at 20:23

## 2024-05-08 RX ADMIN — BUDESONIDE 0.5 MG: 0.5 INHALANT RESPIRATORY (INHALATION) at 21:08

## 2024-05-08 RX ADMIN — BUDESONIDE 0.5 MG: 0.5 INHALANT RESPIRATORY (INHALATION) at 09:04

## 2024-05-08 RX ADMIN — ROFLUMILAST 500 MCG: 500 TABLET ORAL at 20:23

## 2024-05-08 RX ADMIN — METHYLPREDNISOLONE SODIUM SUCCINATE 40 MG: 40 INJECTION, POWDER, FOR SOLUTION INTRAMUSCULAR; INTRAVENOUS at 06:04

## 2024-05-08 RX ADMIN — Medication 1 CAPSULE: at 12:46

## 2024-05-08 RX ADMIN — IPRATROPIUM BROMIDE AND ALBUTEROL SULFATE 3 ML: 2.5; .5 SOLUTION RESPIRATORY (INHALATION) at 13:01

## 2024-05-08 RX ADMIN — METHYLPREDNISOLONE SODIUM SUCCINATE 40 MG: 40 INJECTION, POWDER, FOR SOLUTION INTRAMUSCULAR; INTRAVENOUS at 18:38

## 2024-05-08 RX ADMIN — LOPERAMIDE HYDROCHLORIDE 2 MG: 2 CAPSULE ORAL at 18:38

## 2024-05-08 RX ADMIN — IPRATROPIUM BROMIDE AND ALBUTEROL SULFATE 3 ML: 2.5; .5 SOLUTION RESPIRATORY (INHALATION) at 03:29

## 2024-05-08 RX ADMIN — CEFTRIAXONE SODIUM 2 G: 2 INJECTION, SOLUTION INTRAVENOUS at 21:34

## 2024-05-08 RX ADMIN — FLUTICASONE PROPIONATE 2 SPRAY: 50 SPRAY, METERED NASAL at 08:42

## 2024-05-08 RX ADMIN — Medication 1 APPLICATION: at 12:46

## 2024-05-08 ASSESSMENT — PAIN - FUNCTIONAL ASSESSMENT
PAIN_FUNCTIONAL_ASSESSMENT: 0-10

## 2024-05-08 ASSESSMENT — PAIN SCALES - GENERAL
PAINLEVEL_OUTOF10: 0 - NO PAIN

## 2024-05-08 ASSESSMENT — COGNITIVE AND FUNCTIONAL STATUS - GENERAL
DAILY ACTIVITIY SCORE: 24
MOBILITY SCORE: 24

## 2024-05-08 NOTE — PROGRESS NOTES
Raheel Cloud is a 73 y.o. male on day 4 of admission presenting with COPD exacerbation (Multi).      Subjective   -Patient seen resting comfortably in bed on 4 L via nasal cannula  -Patient endorsed 1 hour of sleep overnight due to difficulty breathing while lying flat  --> Tolerated BiPAP for approximately 1 hour duration last night  -Patient endorsed shortness of breath, increased secretions, cough, and diarrhea from antibiotics but denied CP, palpitations, lightheadedness, dizziness, nausea, vomiting.       Objective     Last Recorded Vitals  /52 (BP Location: Left arm, Patient Position: Lying)   Pulse 80   Temp 36.6 °C (97.9 °F) (Temporal)   Resp 22   Wt 66 kg (145 lb 8.1 oz)   SpO2 94%   Intake/Output last 3 Shifts:  No intake or output data in the 24 hours ending 05/08/24 1546    Admission Weight  Weight: 64.9 kg (143 lb) (05/04/24 1452)    Daily Weight  05/08/24 : 66 kg (145 lb 8.1 oz)    Image Results  Transthoracic Echo (TTE) [facility]  [address] [address]     Tel [phone] Fax [phone]    TRANSTHORACIC ECHOCARDIOGRAM REPORT       Patient Name:     RAHEEL CLOUD        Reading Physician:   [id number] Paul Ibrahim MD  Study Date:       5/6/2024             Ordering Provider:   [id number] KADIE LAWTON  MRN/PID:          [id number]             Fellow:  Accession#:       [id number]         Nurse:  Date of           1950 / 73 years Sonographer:         Cielo Myers RDCS  Birth/Age:  Gender:           M                    Additional Staff:  Height:           175.26 cm            Admit Date:  Weight:           68.49 kg             Admission Status:    Inpatient - Routine  BSA / BMI:        1.83 m2 / 22.30      Department Location: [facility] Echo Lab                    kg/m2  Blood Pressure: 132 /57 mmHg    Study Type:     TRANSTHORACIC ECHO (TTE) LIMITED  Diagnosis/ICD: Supraventricular tachycardia-I47.1  Indication:    Vtach  CPT Codes:     Echo Limited-04287   Study Detail: The following Echo studies were performed: 2D, Doppler and color                flow. Technically challenging study due to poor acoustic windows                and prominent lung artifact.       PHYSICIAN INTERPRETATION:  Left Ventricle: Left ventricular systolic function is normal, with an estimated ejection fraction of 55%. Wall motion is abnormal. The left ventricular cavity size is normal. Spectral Doppler shows an impaired relaxation pattern of left ventricular diastolic filling.  LV Wall Scoring:  The basal and mid inferior septum and mid anteroseptal segment are hypokinetic.  All remaining scored segments are normal.    Left Atrium: The left atrium is normal in size.  Right Ventricle: The right ventricle is normal in size. There is normal right ventricular global systolic function.  Right Atrium: The right atrium is normal in size.  Aortic Valve: The aortic valve is trileaflet. Aortic valve regurgitation was not assessed.  Mitral Valve: The mitral valve is normal in structure. There is trace mitral valve regurgitation.  Tricuspid Valve: The tricuspid valve is structurally normal. There is trace tricuspid regurgitation.  Pulmonic Valve: The pulmonic valve is not well visualized. The pulmonic valve regurgitation was not assessed.  Pericardium: There is a trivial pericardial effusion.  Aorta: The aortic root is normal.  Systemic Veins: The inferior vena cava appears to be of normal size. There is IVC inspiratory collapse greater than 50%.       CONCLUSIONS:   1. Left ventricular systolic function is normal with a 55% estimated ejection fraction.   2. Basal and mid inferior septum and mid anteroseptal segment are abnormal.   3. Spectral Doppler shows an impaired relaxation pattern of left ventricular diastolic filling.    QUANTITATIVE DATA SUMMARY:  2D  MEASUREMENTS:                            Normal Ranges:  IVSd:          0.79 cm    (0.6-1.1cm)  LVPWd:         1.15 cm    (0.6-1.1cm)  LVIDd:         4.50 cm    (3.9-5.9cm)  LV Mass Index: 119.8 g/m2    LV SYSTOLIC FUNCTION BY 2D PLANIMETRY (MOD):                      Normal Ranges:  EF-A4C View: 52.9 % (>=55%)    LV DIASTOLIC FUNCTION:                      Normal Ranges:  MV Peak E: 0.83 m/s (0.7-1.2 m/s)  MV Peak A: 1.16 m/s (0.42-0.7 m/s)  E/A Ratio: 0.71     (1.0-2.2)    MITRAL VALVE:                  Normal Ranges:  MV DT: 134 msec (150-240msec)       [id number] Paul Ibrahim MD  Electronically signed on 5/7/2024 at 8:59:49 AM       Wall Scoring       ** Final **      Physical Exam  General: Not in acute distress, A&O x 3, alert, cooperative, well-developed  HEENT: Normocephalic, atraumatic, EOMI, moist mucous membranes  Neck: Neck supple, trachea midline, no evidence of trauma  Cardiovascular: RRR, S1 and S2 appreciated, no murmurs rubs gallops appreciated, distal pulses 2+ bilaterally (radial and dorsalis pedis)  Respiratory: Decreased breath sounds and diffuse wheezes appreciated bilaterally, mildly increased work of breathing on 4 L via NC  GI: Abdomen soft, nondistended, nontender to palpation, bowel sounds present  Extremities: Trace to 1+ pedal edema appreciated in lower extremities bilaterally, no cyanosis  Neuro: A&O X3, no focal deficits, strength and sensation intact bilaterally  Skin: Warm and dry, without lesions or rashes    Relevant Results  Scheduled medications  albuterol, 2.5 mg, nebulization, q8h  budesonide, 0.5 mg, nebulization, BID  cefTRIAXone, 2 g, intravenous, q24h  fluticasone, 2 spray, Each Nostril, BID  ipratropium-albuteroL, 3 mL, nebulization, q8h  lactobacillus acidophilus, 1 capsule, oral, BID  melatonin, 5 mg, oral, Daily  methylPREDNISolone sodium succinate (PF), 40 mg, intravenous, q12h  nebivolol, 2.5 mg, oral, Daily  nicotine, 1 patch, transdermal, Daily  pantoprazole, 40 mg,  oral, BID  rivaroxaban, 15 mg, oral, Daily with evening meal  roflumilast, 500 mcg, oral, Daily  tamsulosin, 0.4 mg, oral, Nightly      Continuous medications     PRN medications  PRN medications: acetaminophen, albuterol, ammonium lactate, benzocaine-menthol, benzonatate, bismuth subsalicylate, dextromethorphan-guaifenesin, loperamide, nicotine polacrilex, ondansetron, oxygen, oxygen, sodium chloride  Transthoracic Echo (TTE) Limited    Result Date: 5/7/2024            [facility] [place] [address], [facility] [address]    Tel [phone] Fax [phone] TRANSTHORACIC ECHOCARDIOGRAM REPORT  Patient Name:     KENNA Pardo Physician:   [id number] Paul Ibrahim MD Study Date:       5/6/2024             Ordering Provider:   [id number] KADIE LAWTON MRN/PID:          [id number]             Fellow: Accession#:       [id number]         Nurse: Date of           1950 / 73 years Sonographer:         Cielo Myers [facility] Birth/Age: Gender:           M                    Additional Staff: Height:           175.26 cm            Admit Date: Weight:           68.49 kg             Admission Status:    Inpatient - Routine BSA / BMI:        1.83 m2 / 22.30      Department Location: [facility] Echo Lab                   kg/m2 Blood Pressure: 132 /57 mmHg Study Type:    TRANSTHORACIC ECHO (TTE) LIMITED Diagnosis/ICD: Supraventricular tachycardia-I47.1 Indication:    Vtach CPT Codes:     Echo Limited-47643  Study Detail: The following Echo studies were performed: 2D, Doppler and color               flow. Technically challenging study due to poor acoustic windows               and prominent lung artifact.  PHYSICIAN INTERPRETATION: Left Ventricle: Left ventricular systolic function is normal, with an estimated ejection fraction of 55%. Wall motion is abnormal. The left ventricular cavity size is  normal. Spectral Doppler shows an impaired relaxation pattern of left ventricular diastolic filling. LV Wall Scoring: The basal and mid inferior septum and mid anteroseptal segment are hypokinetic. All remaining scored segments are normal. Left Atrium: The left atrium is normal in size. Right Ventricle: The right ventricle is normal in size. There is normal right ventricular global systolic function. Right Atrium: The right atrium is normal in size. Aortic Valve: The aortic valve is trileaflet. Aortic valve regurgitation was not assessed. Mitral Valve: The mitral valve is normal in structure. There is trace mitral valve regurgitation. Tricuspid Valve: The tricuspid valve is structurally normal. There is trace tricuspid regurgitation. Pulmonic Valve: The pulmonic valve is not well visualized. The pulmonic valve regurgitation was not assessed. Pericardium: There is a trivial pericardial effusion. Aorta: The aortic root is normal. Systemic Veins: The inferior vena cava appears to be of normal size. There is IVC inspiratory collapse greater than 50%.  CONCLUSIONS:  1. Left ventricular systolic function is normal with a 55% estimated ejection fraction.  2. Basal and mid inferior septum and mid anteroseptal segment are abnormal.  3. Spectral Doppler shows an impaired relaxation pattern of left ventricular diastolic filling. QUANTITATIVE DATA SUMMARY: 2D MEASUREMENTS:                           Normal Ranges: IVSd:          0.79 cm    (0.6-1.1cm) LVPWd:         1.15 cm    (0.6-1.1cm) LVIDd:         4.50 cm    (3.9-5.9cm) LV Mass Index: 119.8 g/m2 LV SYSTOLIC FUNCTION BY 2D PLANIMETRY (MOD):                     Normal Ranges: EF-A4C View: 52.9 % (>=55%) LV DIASTOLIC FUNCTION:                     Normal Ranges: MV Peak E: 0.83 m/s (0.7-1.2 m/s) MV Peak A: 1.16 m/s (0.42-0.7 m/s) E/A Ratio: 0.71     (1.0-2.2) MITRAL VALVE:                 Normal Ranges: MV DT: 134 msec (150-240msec)  [id number] Paul Ibrahim MD Electronically signed  on 5/7/2024 at 8:59:49 AM  Wall Scoring  ** Final **     Electrocardiogram, 12-lead PRN ACS symptoms    Result Date: 5/6/2024  Normal sinus rhythm Normal ECG When compared with ECG of 04-MAY-2024 15:51, No significant change was found    ECG 12 lead    Result Date: 5/5/2024  Normal sinus rhythm Rightward axis Borderline ECG When compared with ECG of 04-MAY-2024 14:34, (unconfirmed) No significant change was found Confirmed by Sedrick Myles ([id number]) on 5/5/2024 2:20:22 PM    ECG 12 lead    Result Date: 5/5/2024  Sinus rhythm with sinus arrhythmia Borderline Low voltage in frontal leads Otherwise normal ECG When compared with ECG of 20-MAY-2023 23:55, No significant change was found Confirmed by Sedrick Myles ([id number]) on 5/5/2024 2:19:50 PM    CT chest wo IV contrast    Result Date: 5/5/2024  Interpreted By:  Schoenberger, Joseph, STUDY: CT CHEST WO IV CONTRAST;  5/5/2024 8:53 am   INDICATION: Signs/Symptoms:Pneumonia vs viral bronchitis/AECOPD, hx of multiple pulmnary nodules.   COMPARISON: 05/21/2023   ACCESSION NUMBER(S): [id number]   ORDERING CLINICIAN: ROB CORREA   TECHNIQUE: Helical data acquisition of the chest was obtained  without IV contrast material.  Images were reformatted in axial, coronal, and sagittal planes.   FINDINGS: LUNGS AND AIRWAYS: The trachea and central airways are patent. No endobronchial lesion.   In the interval since the prior exam, there are multifocal peribronchovascular ground-glass opacities which are new from the prior exam. There are nonspecific though viral infectious etiology is consideration. There is no pleural effusion or pneumothorax or airspace consolidation. There are moderate 2 severe findings of some upper lung predominant centrilobular emphysema. The bulb lung nodule in the periphery of the anterolateral lower right lower lobe is stable from the prior measuring 12 mm in longitudinal dimension.   MEDIASTINUM AND CAROLYN, LOWER NECK AND AXILLA: The visualized  thyroid gland is within normal limits.   No evidence of thoracic lymphadenopathy by CT criteria.   Esophagus appears within normal limits as seen.   HEART AND VESSELS: The thoracic aorta is normal in course and caliber with mild to moderate atherosclerotic calcifications.   Main pulmonary artery and its branches are normal in caliber.   There are moderate coronary atherosclerotic calcifications. The study is not optimized for evaluation of coronary arteries.   There is no enlargement of cardiac chambers.   There is trace pericardial effusion.   UPPER ABDOMEN: The images of the upper abdomen are unremarkable except for right renal cysts also apparent previously.   CHEST WALL AND OSSEOUS STRUCTURES: There are no suspicious osseous lesions. Multilevel degenerative changes are present       1.  In interval development of of peribronchial vascular ground-glass infiltrates superimposed upon emphysematous changes as detailed above. See discussion above   MACRO: None   Signed by: Joseph Schoenberger 5/5/2024 1:43 PM Dictation workstation:   [id number]    XR chest 1 view    Result Date: 5/4/2024  Interpreted By:  Alli Castellanos, STUDY: XR CHEST 1 VIEW  5/4/2024 2:46 pm   INDICATION: Signs/Symptoms:Chest Pain   COMPARISON: 05/21/2023   ACCESSION NUMBER(S): [id number]   ORDERING CLINICIAN: BESS TIWARI   TECHNIQUE: 2 AP portable radiographs of the chest were obtained   FINDINGS: Multiple cardiac monitoring leads are seen over the chest.   No focal infiltrate, pleural effusion or pneumothorax is identified. The cardiac silhouette is within normal limits for size.       No focal infiltrate or pneumothorax is identified.   MACRO: None.   Signed by: Alli Castellanos 5/4/2024 3:17 PM Dictation workstation:   [id number]   Results for orders placed or performed during the hospital encounter of 05/04/24 (from the past 24 hour(s))   Basic Metabolic Panel   Result Value Ref Range    Glucose 106 (H) 74 - 99 mg/dL    Sodium 142 136 -  145 mmol/L    Potassium 3.9 3.5 - 5.3 mmol/L    Chloride 100 98 - 107 mmol/L    Bicarbonate 38 (H) 21 - 32 mmol/L    Anion Gap 8 (L) 10 - 20 mmol/L    Urea Nitrogen 17 6 - 23 mg/dL    Creatinine 0.80 0.50 - 1.30 mg/dL    eGFR >90 >60 mL/min/1.73m*2    Calcium 8.4 (L) 8.6 - 10.3 mg/dL                Assessment/Plan   This patient currently has cardiac telemetry ordered; if you would like to modify or discontinue the telemetry order, click here to go to the orders activity to modify/discontinue the order.  Principal Problem:    COPD exacerbation (Multi)  Acute on chronic hypoxic hypercapnic respiratory failure   COPD and bronchiectasis exacerbation (Multi), ~stage IV COPD  Hx bronchiectasis + chronic bronchitis on home treatment with vest therapy and bronchodilators.  Atypical vs Viral PNA?  Nicotine use hx  Diarrhea s/p antibiotics  pAfib on xarelto   Arrhythmia ?  GERD  Presented for severe COPDE with tripoding to breathe initially on admission, had variable tolerance to the BiPAP with the work of breathing and increased sputum production.  -S/p Zosyn, ceftriaxone, high doses of Solu-Medrol  -Patient perceives his diarrhea is causing him also generalized tiredness and weakness and is most likely 2/2 Zosyn, now refusing Zosyn  -->Continue ceftriaxone daily as tolerated  -Continue prednisone 40 mg twice daily  -DuoNebs Q8 via RT, along with albuterol Q8 via RT to be alternated for each medication to be received 3 times daily  -Begin mechanical vest to aid in expectoration of sputum  -Continue with Acapella and EZ Pap to improve expectoration  -Continue cautious use of the BiPAP on him as it is sometimes relatively contraindicated with increased sputum production and bronchiectasis unless needed for increased work of breathing  ---> If unable to tolerate BiPAP okay to use high flow nasal cannula  -Cefdinir and doxycycline upon discharge as tolerated  -Begin Protonix 40 mg p.o. twice daily as reflux can cause worsening  of respiratory symptoms    Thank you for your consult, pulmonology will continue to follow.    Plan of action discussed with Dr. Velazquez.    Shyam Mueller MD

## 2024-05-08 NOTE — PROGRESS NOTES
Spiritual Care Visit    Clinical Encounter Type  Visited With: Patient  Routine Visit: Follow-up  Continue Visiting: Yes    Yazidism Encounters  Yazidism Needs: Prayer                                       Taxonomy  Intended Effects: Establish rapport and connectedness, Build relationship of care and support, Yenny affirmation, Demonstrate caring and concern

## 2024-05-08 NOTE — PROGRESS NOTES
"Raheel Cloud \"Raheel MOORE" is a 73 y.o. male on day 4 of admission presenting with COPD exacerbation (Multi).    Subjective   Patient seen and examined at bedside.  Patient reports that he had a rough night last night. He reports that he was coughing but has a hard time getting his sputum up and it makes him feel like he is suffocating with the bipap. He also reports continued diarrhea. He has been using imodium occasionally but is afraid as the diarrhea resolves he will end up being constipated. He reports the diarrhea is frequent and explosive but only a small amount. He wonders if a probiotic will be helpful. All questions answered.     Objective     Physical Exam  Constitutional: Well developed, no distress, calm and cooperative  Skin: Warm and dry  Eyes: EOMI, clear sclera  ENMT: mucous membranes moist  Respiratory: improved aeration slightly diminished, two right sided expiratory wheezes, NC in place, wet cough, conversational dyspnea improved  Cardiovascular: Regular, rate and rhythm  Abdominal: Nondistended, soft, non-tender, +BS  MSK: ROM intact, 2+ pitting LE edema up to mid calf (1+ up to knee) with venous stasis skin changes  Neuro: alert and oriented x3    Last Recorded Vitals  Blood pressure 119/63, pulse 72, temperature 36.4 °C (97.5 °F), temperature source Temporal, resp. rate 22, height 1.753 m (5' 9\"), weight 66 kg (145 lb 8.1 oz), SpO2 95%.  Intake/Output last 3 Shifts:  I/O last 3 completed shifts:  In: 650 (9.8 mL/kg) [P.O.:600; IV Piggyback:50]  Out: - (0 mL/kg)   Weight: 66 kg     Relevant Results  Scheduled medications  budesonide, 0.5 mg, nebulization, BID  cefTRIAXone, 2 g, intravenous, q24h  fluticasone, 2 spray, Each Nostril, BID  ipratropium-albuteroL, 3 mL, nebulization, 4x daily  melatonin, 5 mg, oral, Daily  methylPREDNISolone sodium succinate (PF), 40 mg, intravenous, q12h  nebivolol, 2.5 mg, oral, Daily  nicotine, 1 patch, transdermal, Daily  rivaroxaban, 15 mg, oral, Daily with " evening meal  roflumilast, 500 mcg, oral, Daily  tamsulosin, 0.4 mg, oral, Nightly      Continuous medications     PRN medications  PRN medications: acetaminophen, albuterol, ammonium lactate, benzocaine-menthol, benzonatate, bismuth subsalicylate, dextromethorphan-guaifenesin, ipratropium-albuteroL, loperamide, nicotine polacrilex, ondansetron, oxygen, oxygen, sodium chloride  Results from last 7 days   Lab Units 05/07/24  0741 05/06/24 0758 05/05/24 0428   WBC AUTO x10*3/uL 8.8 8.0 7.4   RBC AUTO x10*6/uL 4.17* 4.15* 3.92*   HEMOGLOBIN g/dL 12.0* 12.0* 11.1*     Results from last 7 days   Lab Units 05/08/24 0727 05/07/24 0741 05/06/24 0758 05/05/24 0429 05/04/24  1433   SODIUM mmol/L 142 141 140 139 141   POTASSIUM mmol/L 3.9 4.6 4.7 4.5 4.2   CHLORIDE mmol/L 100 100 101 104 103   CO2 mmol/L 38* 35* 33* 31 31   BUN mg/dL 17 17 17 18 15   CREATININE mg/dL 0.80 0.87 0.88 0.88 0.87   CALCIUM mg/dL 8.4* 8.9 9.1 8.9 9.5   PHOSPHORUS mg/dL  --   --   --  4.1  --    MAGNESIUM mg/dL  --   --   --  3.01*  --    BILIRUBIN TOTAL mg/dL  --   --   --   --  0.8   ALT U/L  --   --   --   --  28   AST U/L  --   --   --   --  22       Transthoracic Echo (TTE) Limited    Result Date: 5/7/2024            [facility] [place] [address] [address]    Tel [phone] Fax [phone] TRANSTHORACIC ECHOCARDIOGRAM REPORT  Patient Name:     KENNA Pardo Physician:   [id number] Paul Ibrahim MD Study Date:       5/6/2024             Ordering Provider:   [id number] KADIE LAWTON MRN/PID:          [id number]             Fellow: Accession#:       [id number]         Nurse: Date of           1950 / 73 years Sonographer:         Cielo Myers RDCS Birth/Age: Gender:           M                    Additional Staff: Height:           175.26 cm            Admit Date: Weight:            68.49 kg             Admission Status:    Inpatient - Routine BSA / BMI:        1.83 m2 / 22.30      Department Location: [facility]                   kg/m2 Blood Pressure: 132 /57 mmHg Study Type:    TRANSTHORACIC ECHO (TTE) LIMITED Diagnosis/ICD: Supraventricular tachycardia-I47.1 Indication:    Vtach CPT Codes:     Echo Limited-28946  Study Detail: The following Echo studies were performed: 2D, Doppler and color               flow. Technically challenging study due to poor acoustic windows               and prominent lung artifact.  PHYSICIAN INTERPRETATION: Left Ventricle: Left ventricular systolic function is normal, with an estimated ejection fraction of 55%. Wall motion is abnormal. The left ventricular cavity size is normal. Spectral Doppler shows an impaired relaxation pattern of left ventricular diastolic filling. LV Wall Scoring: The basal and mid inferior septum and mid anteroseptal segment are hypokinetic. All remaining scored segments are normal. Left Atrium: The left atrium is normal in size. Right Ventricle: The right ventricle is normal in size. There is normal right ventricular global systolic function. Right Atrium: The right atrium is normal in size. Aortic Valve: The aortic valve is trileaflet. Aortic valve regurgitation was not assessed. Mitral Valve: The mitral valve is normal in structure. There is trace mitral valve regurgitation. Tricuspid Valve: The tricuspid valve is structurally normal. There is trace tricuspid regurgitation. Pulmonic Valve: The pulmonic valve is not well visualized. The pulmonic valve regurgitation was not assessed. Pericardium: There is a trivial pericardial effusion. Aorta: The aortic root is normal. Systemic Veins: The inferior vena cava appears to be of normal size. There is IVC inspiratory collapse greater than 50%.  CONCLUSIONS:  1. Left ventricular systolic function is normal with a 55% estimated ejection fraction.  2. Basal and mid inferior septum and mid  anteroseptal segment are abnormal.  3. Spectral Doppler shows an impaired relaxation pattern of left ventricular diastolic filling. QUANTITATIVE DATA SUMMARY: 2D MEASUREMENTS:                           Normal Ranges: IVSd:          0.79 cm    (0.6-1.1cm) LVPWd:         1.15 cm    (0.6-1.1cm) LVIDd:         4.50 cm    (3.9-5.9cm) LV Mass Index: 119.8 g/m2 LV SYSTOLIC FUNCTION BY 2D PLANIMETRY (MOD):                     Normal Ranges: EF-A4C View: 52.9 % (>=55%) LV DIASTOLIC FUNCTION:                     Normal Ranges: MV Peak E: 0.83 m/s (0.7-1.2 m/s) MV Peak A: 1.16 m/s (0.42-0.7 m/s) E/A Ratio: 0.71     (1.0-2.2) MITRAL VALVE:                 Normal Ranges: MV DT: 134 msec (150-240msec)  [id number] Paul Ibrahim MD Electronically signed on 5/7/2024 at 8:59:49 AM  Wall Scoring  ** Final **       Assessment/Plan   Principal Problem:    COPD exacerbation (Multi)  Pneumonia  Acute on chronic hypoxic respiratory failure   Diarrhea   pAfib on xarelto   Hx prostate Ca  HLD  Nicotine use     -improving  -xray imaging suspicious for pneumonia despite read  -CT chest reviewed - interval development of of peribronchial vascular ground-glass infiltrates superimposed upon emphysematous changes  -continue rocephin  -legionella neg, strep pn neg, MRSA neg   -respiratory viral panel neg  -sputum culture had salivary contamination   -Bcx NG x3d  -covid/flu neg   -home O2 2L PRN, currently on 4L and PRN bipap   -continue supplemental O2 to maintain SpO2 > 90%  -acapella  -duonebs q6hrs, PRN albuterol q2hr   -budesonide  -d/c formoterol  -robitussin DM 10ml q4hr PRN or tessalon perles PRN   -solumedrol 40mg q12hrs    -flonase BID, PRN ocean nasal spray   -tele monitoring   -echo - EF 55%, diastolic dysfunction, basal, mid inferior septum and mid anteroseptal segment are hypokinetic   -diarrhea, likely related to zosyn, which has been d/c'd  -continue PRN pepto or imodium, start probiotic   -continue home meds      ANNAI   -encourage oral  hydration   -replete electrolytes PRN  -regular diet   -GI ppx: n/a  -DVT ppx: SCDs, xarelto     Dispo: This is a 73-year-old male who is admitted with acute on chronic hypoxic respiratory failure secondary to COPD exacerbation and suspected pneumonia.  Currently receiving frequent breathing treatments, IV steroids and IV antibiotics.  Will likely stay greater than 2 midnights.     Mago Sales,

## 2024-05-08 NOTE — CARE PLAN
SHIFT NOTE    Diagnosis:  COPD exacerbation.     Assessment:    Patient remains hds. Patient didn't complain of any SOB overnight and sated well on 2 L NC. He does continue to have a cough that he states is still somewhat difficult to expectorate. He continues to report diarrhea that seems to be somewhat relieved with immodium. He's still on IV rocephin and IV solumedrol.     Hourly rounding was performed and patient needs were met. Call light within reach. No other acute events, will continue to monitor.       The patient's goals for the shift include to not be transferred to ICU    The clinical goals for the shift include Patient will remain hds by end of shift 5/8/24 at 0700.

## 2024-05-08 NOTE — PROGRESS NOTES
Attempted to meet with patient and there was another person in room . Will attempt again at a later time.

## 2024-05-09 LAB
ANION GAP SERPL CALC-SCNC: 9 MMOL/L (ref 10–20)
BACTERIA BLD CULT: NORMAL
BACTERIA BLD CULT: NORMAL
BUN SERPL-MCNC: 15 MG/DL (ref 6–23)
CALCIUM SERPL-MCNC: 8.3 MG/DL (ref 8.6–10.3)
CHLORIDE SERPL-SCNC: 101 MMOL/L (ref 98–107)
CO2 SERPL-SCNC: 36 MMOL/L (ref 21–32)
CREAT SERPL-MCNC: 0.8 MG/DL (ref 0.5–1.3)
EGFRCR SERPLBLD CKD-EPI 2021: >90 ML/MIN/1.73M*2
ERYTHROCYTE [DISTWIDTH] IN BLOOD BY AUTOMATED COUNT: 13.2 % (ref 11.5–14.5)
GLUCOSE SERPL-MCNC: 115 MG/DL (ref 74–99)
HCT VFR BLD AUTO: 38 % (ref 41–52)
HGB BLD-MCNC: 11.6 G/DL (ref 13.5–17.5)
MAGNESIUM SERPL-MCNC: 2.39 MG/DL (ref 1.6–2.4)
MCH RBC QN AUTO: 28.9 PG (ref 26–34)
MCHC RBC AUTO-ENTMCNC: 30.5 G/DL (ref 32–36)
MCV RBC AUTO: 95 FL (ref 80–100)
NRBC BLD-RTO: 0 /100 WBCS (ref 0–0)
PHOSPHATE SERPL-MCNC: 3.5 MG/DL (ref 2.5–4.9)
PLATELET # BLD AUTO: 213 X10*3/UL (ref 150–450)
POTASSIUM SERPL-SCNC: 4.1 MMOL/L (ref 3.5–5.3)
RBC # BLD AUTO: 4.02 X10*6/UL (ref 4.5–5.9)
SODIUM SERPL-SCNC: 142 MMOL/L (ref 136–145)
WBC # BLD AUTO: 12.2 X10*3/UL (ref 4.4–11.3)

## 2024-05-09 PROCEDURE — 85027 COMPLETE CBC AUTOMATED: CPT | Performed by: STUDENT IN AN ORGANIZED HEALTH CARE EDUCATION/TRAINING PROGRAM

## 2024-05-09 PROCEDURE — 84100 ASSAY OF PHOSPHORUS: CPT | Performed by: STUDENT IN AN ORGANIZED HEALTH CARE EDUCATION/TRAINING PROGRAM

## 2024-05-09 PROCEDURE — 2500000001 HC RX 250 WO HCPCS SELF ADMINISTERED DRUGS (ALT 637 FOR MEDICARE OP): Performed by: STUDENT IN AN ORGANIZED HEALTH CARE EDUCATION/TRAINING PROGRAM

## 2024-05-09 PROCEDURE — 99233 SBSQ HOSP IP/OBS HIGH 50: CPT

## 2024-05-09 PROCEDURE — 94668 MNPJ CHEST WALL SBSQ: CPT

## 2024-05-09 PROCEDURE — 94640 AIRWAY INHALATION TREATMENT: CPT

## 2024-05-09 PROCEDURE — 84132 ASSAY OF SERUM POTASSIUM: CPT | Mod: 91 | Performed by: STUDENT IN AN ORGANIZED HEALTH CARE EDUCATION/TRAINING PROGRAM

## 2024-05-09 PROCEDURE — 2060000001 HC INTERMEDIATE ICU ROOM DAILY

## 2024-05-09 PROCEDURE — 36415 COLL VENOUS BLD VENIPUNCTURE: CPT | Performed by: STUDENT IN AN ORGANIZED HEALTH CARE EDUCATION/TRAINING PROGRAM

## 2024-05-09 PROCEDURE — 2500000004 HC RX 250 GENERAL PHARMACY W/ HCPCS (ALT 636 FOR OP/ED): Performed by: STUDENT IN AN ORGANIZED HEALTH CARE EDUCATION/TRAINING PROGRAM

## 2024-05-09 PROCEDURE — S4991 NICOTINE PATCH NONLEGEND: HCPCS | Performed by: STUDENT IN AN ORGANIZED HEALTH CARE EDUCATION/TRAINING PROGRAM

## 2024-05-09 PROCEDURE — 99233 SBSQ HOSP IP/OBS HIGH 50: CPT | Performed by: STUDENT IN AN ORGANIZED HEALTH CARE EDUCATION/TRAINING PROGRAM

## 2024-05-09 PROCEDURE — 2500000002 HC RX 250 W HCPCS SELF ADMINISTERED DRUGS (ALT 637 FOR MEDICARE OP, ALT 636 FOR OP/ED): Performed by: STUDENT IN AN ORGANIZED HEALTH CARE EDUCATION/TRAINING PROGRAM

## 2024-05-09 PROCEDURE — 83735 ASSAY OF MAGNESIUM: CPT | Performed by: STUDENT IN AN ORGANIZED HEALTH CARE EDUCATION/TRAINING PROGRAM

## 2024-05-09 PROCEDURE — 2500000001 HC RX 250 WO HCPCS SELF ADMINISTERED DRUGS (ALT 637 FOR MEDICARE OP)

## 2024-05-09 PROCEDURE — 2500000006 HC RX 250 W HCPCS SELF ADMINISTERED DRUGS (ALT 637 FOR ALL PAYERS): Performed by: STUDENT IN AN ORGANIZED HEALTH CARE EDUCATION/TRAINING PROGRAM

## 2024-05-09 RX ORDER — ALBUTEROL SULFATE 90 UG/1
2 AEROSOL, METERED RESPIRATORY (INHALATION) EVERY 4 HOURS PRN
Status: DISCONTINUED | OUTPATIENT
Start: 2024-05-09 | End: 2024-05-09 | Stop reason: CLARIF

## 2024-05-09 RX ORDER — BENZONATATE 100 MG/1
100 CAPSULE ORAL NIGHTLY
Status: DISCONTINUED | OUTPATIENT
Start: 2024-05-09 | End: 2024-05-12 | Stop reason: HOSPADM

## 2024-05-09 RX ADMIN — ALBUTEROL SULFATE 2.5 MG: 2.5 SOLUTION RESPIRATORY (INHALATION) at 09:38

## 2024-05-09 RX ADMIN — ALBUTEROL SULFATE 2.5 MG: 2.5 SOLUTION RESPIRATORY (INHALATION) at 21:15

## 2024-05-09 RX ADMIN — FLUTICASONE PROPIONATE 2 SPRAY: 50 SPRAY, METERED NASAL at 21:00

## 2024-05-09 RX ADMIN — BENZONATATE 100 MG: 100 CAPSULE ORAL at 21:00

## 2024-05-09 RX ADMIN — ALBUTEROL SULFATE 2.5 MG: 2.5 SOLUTION RESPIRATORY (INHALATION) at 04:19

## 2024-05-09 RX ADMIN — IPRATROPIUM BROMIDE AND ALBUTEROL SULFATE 3 ML: 2.5; .5 SOLUTION RESPIRATORY (INHALATION) at 00:29

## 2024-05-09 RX ADMIN — RIVAROXABAN 15 MG: 15 TABLET, FILM COATED ORAL at 17:31

## 2024-05-09 RX ADMIN — ALBUTEROL SULFATE 2.5 MG: 2.5 SOLUTION RESPIRATORY (INHALATION) at 18:02

## 2024-05-09 RX ADMIN — Medication 1 CAPSULE: at 08:26

## 2024-05-09 RX ADMIN — PANTOPRAZOLE SODIUM 40 MG: 40 TABLET, DELAYED RELEASE ORAL at 08:26

## 2024-05-09 RX ADMIN — ALBUTEROL SULFATE 2.5 MG: 2.5 SOLUTION RESPIRATORY (INHALATION) at 12:04

## 2024-05-09 RX ADMIN — BUDESONIDE 0.5 MG: 0.5 INHALANT RESPIRATORY (INHALATION) at 09:37

## 2024-05-09 RX ADMIN — TAMSULOSIN HYDROCHLORIDE 0.4 MG: 0.4 CAPSULE ORAL at 21:00

## 2024-05-09 RX ADMIN — METHYLPREDNISOLONE SODIUM SUCCINATE 40 MG: 40 INJECTION, POWDER, FOR SOLUTION INTRAMUSCULAR; INTRAVENOUS at 18:28

## 2024-05-09 RX ADMIN — NEBIVOLOL HYDROCHLORIDE 2.5 MG: 2.5 TABLET ORAL at 20:59

## 2024-05-09 RX ADMIN — IPRATROPIUM BROMIDE AND ALBUTEROL SULFATE 3 ML: 2.5; .5 SOLUTION RESPIRATORY (INHALATION) at 09:31

## 2024-05-09 RX ADMIN — METHYLPREDNISOLONE SODIUM SUCCINATE 40 MG: 40 INJECTION, POWDER, FOR SOLUTION INTRAMUSCULAR; INTRAVENOUS at 06:14

## 2024-05-09 RX ADMIN — Medication 1 CAPSULE: at 21:00

## 2024-05-09 RX ADMIN — CEFTRIAXONE SODIUM 2 G: 2 INJECTION, SOLUTION INTRAVENOUS at 22:07

## 2024-05-09 RX ADMIN — NICOTINE 1 PATCH: 21 PATCH, EXTENDED RELEASE TRANSDERMAL at 08:26

## 2024-05-09 RX ADMIN — BUDESONIDE 0.5 MG: 0.5 INHALANT RESPIRATORY (INHALATION) at 21:14

## 2024-05-09 RX ADMIN — FLUTICASONE PROPIONATE 2 SPRAY: 50 SPRAY, METERED NASAL at 08:26

## 2024-05-09 RX ADMIN — PANTOPRAZOLE SODIUM 40 MG: 40 TABLET, DELAYED RELEASE ORAL at 14:21

## 2024-05-09 RX ADMIN — IPRATROPIUM BROMIDE AND ALBUTEROL SULFATE 3 ML: 2.5; .5 SOLUTION RESPIRATORY (INHALATION) at 15:12

## 2024-05-09 RX ADMIN — LOPERAMIDE HYDROCHLORIDE 2 MG: 2 CAPSULE ORAL at 23:06

## 2024-05-09 RX ADMIN — ROFLUMILAST 500 MCG: 500 TABLET ORAL at 21:00

## 2024-05-09 ASSESSMENT — COGNITIVE AND FUNCTIONAL STATUS - GENERAL
MOBILITY SCORE: 24
DAILY ACTIVITIY SCORE: 24

## 2024-05-09 ASSESSMENT — PAIN - FUNCTIONAL ASSESSMENT
PAIN_FUNCTIONAL_ASSESSMENT: 0-10
PAIN_FUNCTIONAL_ASSESSMENT: 0-10

## 2024-05-09 ASSESSMENT — PAIN SCALES - GENERAL
PAINLEVEL_OUTOF10: 0 - NO PAIN
PAINLEVEL_OUTOF10: 0 - NO PAIN

## 2024-05-09 NOTE — CARE PLAN
SHIFT NOTE    Diagnosis:  COPD exacerbation.     Assessment:    Patient remains hds. He states he had had a good night's rest. He's receiving breathing treatments Q4H around the clock and has been tolerating being on 2.5 L NC. He wore the high flow for a little while overnight. No complaints of pain.     Hourly rounding was performed and patient needs were met. Call light within reach. No other acute events, will continue to monitor.       The patient's goals for the shift include to not be transferred to ICU    The clinical goals for the shift include Patient will remain hds by end of shift 5/9/24 at 0700.

## 2024-05-09 NOTE — PROGRESS NOTES
"Raheel Cloud \"Raheel MOORE" is a 73 y.o. male on day 5 of admission presenting with COPD exacerbation (Multi).    Subjective   Patient seen and examined at bedside.  Patient reports that he was initially doing well this morning. He slept for 4 hours last night with the high flow oxygen and was feeling well around 8am. He states that his breathing treatment was late and he was feeling stressed about that and more short of breath. He was worried that he was stepping backward. I saw him a second time after the breathing treatment, and he reports feeling a little better.     Objective     Physical Exam  Constitutional: Well developed, no distress, calm and cooperative  Skin: Warm and dry  Eyes: EOMI, clear sclera  ENMT: mucous membranes moist  Respiratory: improved aeration, no wheezing, NC in place, wet cough, conversational dyspnea improving  Cardiovascular: Regular, rate and rhythm  Abdominal: Nondistended, soft, non-tender, +BS  MSK: ROM intact, 1+ pitting LE edema with venous stasis skin changes  Neuro: alert and oriented x3    Last Recorded Vitals  Blood pressure (!) 120/47, pulse 82, temperature 36.7 °C (98.1 °F), temperature source Temporal, resp. rate 20, height 1.753 m (5' 9\"), weight 66.2 kg (145 lb 15.1 oz), SpO2 94%.  Intake/Output last 3 Shifts:  No intake/output data recorded.    Relevant Results  Scheduled medications  albuterol, 2.5 mg, nebulization, q8h  benzonatate, 100 mg, oral, Nightly  budesonide, 0.5 mg, nebulization, BID  cefTRIAXone, 2 g, intravenous, q24h  fluticasone, 2 spray, Each Nostril, BID  ipratropium-albuteroL, 3 mL, nebulization, q8h  lactobacillus acidophilus, 1 capsule, oral, BID  melatonin, 5 mg, oral, Daily  methylPREDNISolone sodium succinate (PF), 40 mg, intravenous, q12h  nebivolol, 2.5 mg, oral, Daily  nicotine, 1 patch, transdermal, Daily  pantoprazole, 40 mg, oral, BID  rivaroxaban, 15 mg, oral, Daily with evening meal  roflumilast, 500 mcg, oral, Daily  tamsulosin, 0.4 mg, " oral, Nightly      Continuous medications     PRN medications  PRN medications: acetaminophen, albuterol, ammonium lactate, benzocaine-menthol, benzonatate, bismuth subsalicylate, dextromethorphan-guaifenesin, loperamide, nicotine polacrilex, ondansetron, oxygen, oxygen, sodium chloride  Results from last 7 days   Lab Units 05/09/24 0409 05/07/24  0741 05/06/24  0758   WBC AUTO x10*3/uL 12.2* 8.8 8.0   RBC AUTO x10*6/uL 4.02* 4.17* 4.15*   HEMOGLOBIN g/dL 11.6* 12.0* 12.0*     Results from last 7 days   Lab Units 05/09/24 0409 05/08/24 0727 05/07/24  0741 05/06/24  0758 05/05/24  0429 05/04/24  1433   SODIUM mmol/L 142 142 141   < > 139 141   POTASSIUM mmol/L 4.1 3.9 4.6   < > 4.5 4.2   CHLORIDE mmol/L 101 100 100   < > 104 103   CO2 mmol/L 36* 38* 35*   < > 31 31   BUN mg/dL 15 17 17   < > 18 15   CREATININE mg/dL 0.80 0.80 0.87   < > 0.88 0.87   CALCIUM mg/dL 8.3* 8.4* 8.9   < > 8.9 9.5   PHOSPHORUS mg/dL 3.5  --   --   --  4.1  --    MAGNESIUM mg/dL 2.39  --   --   --  3.01*  --    BILIRUBIN TOTAL mg/dL  --   --   --   --   --  0.8   ALT U/L  --   --   --   --   --  28   AST U/L  --   --   --   --   --  22    < > = values in this interval not displayed.       No results found.     Assessment/Plan   Principal Problem:    COPD exacerbation (Multi)  Pneumonia  Acute on chronic hypoxic respiratory failure   Diarrhea   pAfib on xarelto   Hx prostate Ca  HLD  Nicotine use     -improving  -xray imaging suspicious for pneumonia despite read  -CT chest reviewed - interval development of of peribronchial vascular ground-glass infiltrates superimposed upon emphysematous changes  -continue rocephin  -legionella neg, strep pn neg, MRSA neg   -respiratory viral panel neg  -sputum culture had salivary contamination   -Bcx NG x4d  -covid/flu neg   -home O2 2L PRN, currently on 4L and PRN bipap/high flow  -continue supplemental O2 to maintain SpO2 > 90%  -acapella  -duonebs q6hrs, PRN albuterol q2hr   -budesonide  -d/c  formoterol  -robitussin DM 10ml q4hr PRN or tessalon perles PRN   -will trial tessalon perles at bedtime today   -solumedrol 40mg q12hrs    -flonase BID, PRN ocean nasal spray   -tele monitoring   -echo - EF 55%, diastolic dysfunction, basal, mid inferior septum and mid anteroseptal segment are hypokinetic   -diarrhea, likely related to zosyn, which has been d/c'd and is improving   -continue PRN pepto or imodium, probiotic   -continue home meds      FENGI   -encourage oral hydration   -replete electrolytes PRN  -regular diet   -GI ppx: n/a  -DVT ppx: SCDs, xarelto     Dispo: This is a 73-year-old male who is admitted with acute on chronic hypoxic respiratory failure secondary to severe COPD exacerbation and suspected pneumonia.  Currently receiving frequent breathing treatments, IV steroids and IV antibiotics.  Will likely stay greater than 2 midnights.     Mago Sales,

## 2024-05-09 NOTE — PROGRESS NOTES
Raheel Cloud is a 73 y.o. male on day 5 of admission presenting with COPD exacerbation (Multi).      Subjective   -Patient seen resting comfortably in bed on 2.5 L via nasal cannula  -Tolerated HFNC overnight, achieves approximately 4 hours of sleep which patient was very happy with.  -Patient endorsed shortness of breath, increased secretions, cough, and diarrhea from antibiotics but denied CP, palpitations, lightheadedness, dizziness, nausea, vomiting.  -Patient also endorsed having some sputum expectoration, but feels that there is more and that it is slowly getting to a point where more can be expectorated.       Objective     Last Recorded Vitals  BP (!) 120/47   Pulse 82   Temp 36.7 °C (98.1 °F) (Temporal)   Resp 20   Wt 66.2 kg (145 lb 15.1 oz)   SpO2 94%   Intake/Output last 3 Shifts:  No intake or output data in the 24 hours ending 05/09/24 1440    Admission Weight  Weight: 64.9 kg (143 lb) (05/04/24 1452)    Daily Weight  05/09/24 : 66.2 kg (145 lb 15.1 oz)    Image Results  Electrocardiogram, 12-lead PRN ACS symptoms  Normal sinus rhythm  Normal ECG  When compared with ECG of 04-MAY-2024 15:51,  No significant change was found  Confirmed by Av Servin ([id number]) on 5/8/2024 6:04:26 PM      Physical Exam  General: Not in acute distress, A&O x 3, alert, cooperative, well-developed  HEENT: Normocephalic, atraumatic, EOMI, moist mucous membranes  Neck: Neck supple, trachea midline, no evidence of trauma  Cardiovascular: RRR, S1 and S2 appreciated, no murmurs rubs gallops appreciated, distal pulses 2+ bilaterally (radial and dorsalis pedis)  Respiratory: Decreased breath sounds appreciated bilaterally, no adventitious sounds appreciated, mildly increased work of breathing on 2.5 L via NC  GI: Abdomen soft, nondistended, nontender to palpation, bowel sounds present  Extremities: Trace to 1+ pedal edema appreciated in lower extremities bilaterally unchanged from prior, no cyanosis  Neuro: A&O X3, no  focal deficits, strength and sensation intact bilaterally  Skin: Warm and dry, without lesions or rashes    Relevant Results  Scheduled medications  albuterol, 2.5 mg, nebulization, q8h  benzonatate, 100 mg, oral, Nightly  budesonide, 0.5 mg, nebulization, BID  cefTRIAXone, 2 g, intravenous, q24h  fluticasone, 2 spray, Each Nostril, BID  ipratropium-albuteroL, 3 mL, nebulization, q8h  lactobacillus acidophilus, 1 capsule, oral, BID  melatonin, 5 mg, oral, Daily  methylPREDNISolone sodium succinate (PF), 40 mg, intravenous, q12h  nebivolol, 2.5 mg, oral, Daily  nicotine, 1 patch, transdermal, Daily  pantoprazole, 40 mg, oral, BID  rivaroxaban, 15 mg, oral, Daily with evening meal  roflumilast, 500 mcg, oral, Daily  tamsulosin, 0.4 mg, oral, Nightly      Continuous medications     PRN medications  PRN medications: acetaminophen, albuterol, ammonium lactate, benzocaine-menthol, benzonatate, bismuth subsalicylate, dextromethorphan-guaifenesin, loperamide, nicotine polacrilex, ondansetron, oxygen, oxygen, sodium chloride  Transthoracic Echo (TTE) Limited    Result Date: 5/7/2024            [facility] [place] [address] [address]    Tel [phone] Fax [phone] TRANSTHORACIC ECHOCARDIOGRAM REPORT  Patient Name:     KENNA Pardo Physician:   [id number] Paul Ibrahim MD Study Date:       5/6/2024             Ordering Provider:   [id number] KADIE LAWTON MRN/PID:          [id number]             Fellow: Accession#:       [id number]         Nurse: Date of           1950 / 73 years Sonographer:         Cielo Myers [facility] Birth/Age: Gender:           M                    Additional Staff: Height:           175.26 cm            Admit Date: Weight:           68.49 kg             Admission Status:    Inpatient - Routine BSA / BMI:        1.83 m2 / 22.30      Department  Location: [facility] Echo Lab                   kg/m2 Blood Pressure: 132 /57 mmHg Study Type:    TRANSTHORACIC ECHO (TTE) LIMITED Diagnosis/ICD: Supraventricular tachycardia-I47.1 Indication:    Vtach CPT Codes:     Echo Limited-43648  Study Detail: The following Echo studies were performed: 2D, Doppler and color               flow. Technically challenging study due to poor acoustic windows               and prominent lung artifact.  PHYSICIAN INTERPRETATION: Left Ventricle: Left ventricular systolic function is normal, with an estimated ejection fraction of 55%. Wall motion is abnormal. The left ventricular cavity size is normal. Spectral Doppler shows an impaired relaxation pattern of left ventricular diastolic filling. LV Wall Scoring: The basal and mid inferior septum and mid anteroseptal segment are hypokinetic. All remaining scored segments are normal. Left Atrium: The left atrium is normal in size. Right Ventricle: The right ventricle is normal in size. There is normal right ventricular global systolic function. Right Atrium: The right atrium is normal in size. Aortic Valve: The aortic valve is trileaflet. Aortic valve regurgitation was not assessed. Mitral Valve: The mitral valve is normal in structure. There is trace mitral valve regurgitation. Tricuspid Valve: The tricuspid valve is structurally normal. There is trace tricuspid regurgitation. Pulmonic Valve: The pulmonic valve is not well visualized. The pulmonic valve regurgitation was not assessed. Pericardium: There is a trivial pericardial effusion. Aorta: The aortic root is normal. Systemic Veins: The inferior vena cava appears to be of normal size. There is IVC inspiratory collapse greater than 50%.  CONCLUSIONS:  1. Left ventricular systolic function is normal with a 55% estimated ejection fraction.  2. Basal and mid inferior septum and mid anteroseptal segment are abnormal.  3. Spectral Doppler shows an impaired relaxation pattern of left ventricular  diastolic filling. QUANTITATIVE DATA SUMMARY: 2D MEASUREMENTS:                           Normal Ranges: IVSd:          0.79 cm    (0.6-1.1cm) LVPWd:         1.15 cm    (0.6-1.1cm) LVIDd:         4.50 cm    (3.9-5.9cm) LV Mass Index: 119.8 g/m2 LV SYSTOLIC FUNCTION BY 2D PLANIMETRY (MOD):                     Normal Ranges: EF-A4C View: 52.9 % (>=55%) LV DIASTOLIC FUNCTION:                     Normal Ranges: MV Peak E: 0.83 m/s (0.7-1.2 m/s) MV Peak A: 1.16 m/s (0.42-0.7 m/s) E/A Ratio: 0.71     (1.0-2.2) MITRAL VALVE:                 Normal Ranges: MV DT: 134 msec (150-240msec)  [id number] Paul Ibrahim MD Electronically signed on 5/7/2024 at 8:59:49 AM  Wall Scoring  ** Final **     Electrocardiogram, 12-lead PRN ACS symptoms    Result Date: 5/6/2024  Normal sinus rhythm Normal ECG When compared with ECG of 04-MAY-2024 15:51, No significant change was found    ECG 12 lead    Result Date: 5/5/2024  Normal sinus rhythm Rightward axis Borderline ECG When compared with ECG of 04-MAY-2024 14:34, (unconfirmed) No significant change was found Confirmed by Sedrick Myles ([id number]) on 5/5/2024 2:20:22 PM    ECG 12 lead    Result Date: 5/5/2024  Sinus rhythm with sinus arrhythmia Borderline Low voltage in frontal leads Otherwise normal ECG When compared with ECG of 20-MAY-2023 23:55, No significant change was found Confirmed by Sedrick Myles ([id number]) on 5/5/2024 2:19:50 PM    CT chest wo IV contrast    Result Date: 5/5/2024  Interpreted By:  Schoenberger, Joseph, STUDY: CT CHEST WO IV CONTRAST;  5/5/2024 8:53 am   INDICATION: Signs/Symptoms:Pneumonia vs viral bronchitis/AECOPD, hx of multiple pulmnary nodules.   COMPARISON: 05/21/2023   ACCESSION NUMBER(S): [id number]   ORDERING CLINICIAN: ROB CORREA   TECHNIQUE: Helical data acquisition of the chest was obtained  without IV contrast material.  Images were reformatted in axial, coronal, and sagittal planes.   FINDINGS: LUNGS AND AIRWAYS: The trachea and central airways  are patent. No endobronchial lesion.   In the interval since the prior exam, there are multifocal peribronchovascular ground-glass opacities which are new from the prior exam. There are nonspecific though viral infectious etiology is consideration. There is no pleural effusion or pneumothorax or airspace consolidation. There are moderate 2 severe findings of some upper lung predominant centrilobular emphysema. The bulb lung nodule in the periphery of the anterolateral lower right lower lobe is stable from the prior measuring 12 mm in longitudinal dimension.   MEDIASTINUM AND CAROLYN, LOWER NECK AND AXILLA: The visualized thyroid gland is within normal limits.   No evidence of thoracic lymphadenopathy by CT criteria.   Esophagus appears within normal limits as seen.   HEART AND VESSELS: The thoracic aorta is normal in course and caliber with mild to moderate atherosclerotic calcifications.   Main pulmonary artery and its branches are normal in caliber.   There are moderate coronary atherosclerotic calcifications. The study is not optimized for evaluation of coronary arteries.   There is no enlargement of cardiac chambers.   There is trace pericardial effusion.   UPPER ABDOMEN: The images of the upper abdomen are unremarkable except for right renal cysts also apparent previously.   CHEST WALL AND OSSEOUS STRUCTURES: There are no suspicious osseous lesions. Multilevel degenerative changes are present       1.  In interval development of of peribronchial vascular ground-glass infiltrates superimposed upon emphysematous changes as detailed above. See discussion above   MACRO: None   Signed by: Joseph Schoenberger 5/5/2024 1:43 PM Dictation workstation:   [id number]    XR chest 1 view    Result Date: 5/4/2024  Interpreted By:  Alli Castellanos, STUDY: XR CHEST 1 VIEW  5/4/2024 2:46 pm   INDICATION: Signs/Symptoms:Chest Pain   COMPARISON: 05/21/2023   ACCESSION NUMBER(S): [id number]   ORDERING CLINICIAN: BESS TIWARI    TECHNIQUE: 2 AP portable radiographs of the chest were obtained   FINDINGS: Multiple cardiac monitoring leads are seen over the chest.   No focal infiltrate, pleural effusion or pneumothorax is identified. The cardiac silhouette is within normal limits for size.       No focal infiltrate or pneumothorax is identified.   MACRO: None.   Signed by: Alli Castellanos 5/4/2024 3:17 PM Dictation workstation:   [id number]   Results for orders placed or performed during the hospital encounter of 05/04/24 (from the past 24 hour(s))   CBC   Result Value Ref Range    WBC 12.2 (H) 4.4 - 11.3 x10*3/uL    nRBC 0.0 0.0 - 0.0 /100 WBCs    RBC 4.02 (L) 4.50 - 5.90 x10*6/uL    Hemoglobin 11.6 (L) 13.5 - 17.5 g/dL    Hematocrit 38.0 (L) 41.0 - 52.0 %    MCV 95 80 - 100 fL    MCH 28.9 26.0 - 34.0 pg    MCHC 30.5 (L) 32.0 - 36.0 g/dL    RDW 13.2 11.5 - 14.5 %    Platelets 213 150 - 450 x10*3/uL   Basic Metabolic Panel   Result Value Ref Range    Glucose 115 (H) 74 - 99 mg/dL    Sodium 142 136 - 145 mmol/L    Potassium 4.1 3.5 - 5.3 mmol/L    Chloride 101 98 - 107 mmol/L    Bicarbonate 36 (H) 21 - 32 mmol/L    Anion Gap 9 (L) 10 - 20 mmol/L    Urea Nitrogen 15 6 - 23 mg/dL    Creatinine 0.80 0.50 - 1.30 mg/dL    eGFR >90 >60 mL/min/1.73m*2    Calcium 8.3 (L) 8.6 - 10.3 mg/dL   Magnesium   Result Value Ref Range    Magnesium 2.39 1.60 - 2.40 mg/dL   Phosphorus   Result Value Ref Range    Phosphorus 3.5 2.5 - 4.9 mg/dL                Assessment/Plan   This patient currently has cardiac telemetry ordered; if you would like to modify or discontinue the telemetry order, click here to go to the orders activity to modify/discontinue the order.  Principal Problem:    COPD exacerbation (Multi)  Acute on chronic hypoxic hypercapnic respiratory failure   COPD and bronchiectasis exacerbation (Multi), ~stage IV COPD  Hx bronchiectasis + chronic bronchitis on home treatment with vest therapy and bronchodilators.  Atypical vs Viral PNA?  Nicotine use  hx  Diarrhea s/p antibiotics  pAfib on xarelto   Arrhythmia ?  GERD  Presented for severe COPDE with tripoding to breathe initially on admission, had variable tolerance to the BiPAP with the work of breathing and increased sputum production.  -S/p Zosyn, ceftriaxone, high doses of Solu-Medrol  -Patient perceives his diarrhea is causing him also generalized tiredness and weakness and is most likely 2/2 Zosyn, now refusing Zosyn  -->Continue ceftriaxone daily as tolerated  -Continue prednisone 40 mg twice daily  -Continue DuoNebs Q8 via RT, along with albuterol Q8 via RT to be alternated for each medication to be received 3 times daily  -Begin mechanical vest to aid in expectoration of sputum  -Continue with Acapella and EZ Pap to improve expectoration  ---> Continue EZ Pap with nebulized therapy as well.  -Continue cautious use of the BiPAP on him as it is sometimes relatively contraindicated with increased sputum production and bronchiectasis unless needed for increased work of breathing  ---> Continue HFNC nightly as patient tolerated well with good sleep acquisition  -Cefdinir and doxycycline upon discharge as tolerated  -Continue Protonix 40 mg p.o. twice daily as reflux can cause worsening of respiratory symptoms    Thank you for your consult, pulmonology will continue to follow.    Plan of action discussed with Dr. Velazquez.    Shyam Mueller MD

## 2024-05-09 NOTE — CARE PLAN
Problem: Chronic Conditions and Co-morbidities  Goal: Patient's chronic conditions and co-morbidity symptoms are monitored and maintained or improved  Outcome: Progressing  Flowsheets (Taken 5/7/2024 2038 by Maris Troy RN)  Care Plan - Patient's Chronic Conditions and Co-Morbidity Symptoms are Monitored and Maintained or Improved:   Monitor and assess patient's chronic conditions and comorbid symptoms for stability, deterioration, or improvement   Collaborate with multidisciplinary team to address chronic and comorbid conditions and prevent exacerbation or deterioration   Update acute care plan with appropriate goals if chronic or comorbid symptoms are exacerbated and prevent overall improvement and discharge     Problem: Respiratory  Goal: Clear secretions with interventions this shift  Outcome: Progressing  Flowsheets (Taken 5/7/2024 2038 by Maris Troy RN)  Clear secretions with interventions this shift:   Encourage/provide pulmonary hygiene/secretion clearance   Med administration/monitoring of effect  Goal: Minimal/no exertional discomfort or dyspnea this shift  Outcome: Progressing  Flowsheets (Taken 5/7/2024 2038 by Maris Troy RN)  Minimal/no exertional discomfort or dyspnea this shift: Positioning to promote ventilation/comfort  Goal: No signs of respiratory distress (eg. Use of accessory muscles. Peds grunting)  Outcome: Progressing  Flowsheets (Taken 5/7/2024 2038 by Maris Troy RN)  No signs of respiratory distress (eg. Use of accessory muscles. Peds grunting: Monitor maternal/fetal well-being  Goal: Verbalize decreased shortness of breath this shift  Outcome: Progressing  Flowsheets (Taken 5/7/2024 2038 by Maris Troy RN)  Verbalize decreased shortness of breath this shift: Encourage/provide pulmonary hygiene/secretion clearance     Problem: Respiratory  Goal: Increase self care and/or family involvement in next 24 hours  Outcome: Met   The patient's goals for the shift include to not be  transferred to ICU    The clinical goals for the shift include Pt will report improvement in SOB by the end of the shift

## 2024-05-09 NOTE — PROGRESS NOTES
05/09/24 1430   Discharge Planning   Living Arrangements Children   Support Systems Children   Assistance Needed none   Type of Residence Private residence   Do you have animals or pets at home? Yes   Type of Animals or Pets dog   Who is requesting discharge planning? Provider   Home or Post Acute Services None   Patient expects to be discharged to: home   Does the patient need discharge transport arranged? No     Met with patient at bedside. Introduced self and role in hospital. Verified address and PCP. Takes medications as directed and is educated in reasons for taking them. Patient uses [employer] at home and [employer] are the [employer] that provide all equipment. Patient states that he feels as though he has no needs and will return home on discharge. Patient did say that he has done some pulmonary rehab in the past and will probably need that type of rehab on discharge. TCC team to follow patient for all discharge needs.

## 2024-05-10 ENCOUNTER — APPOINTMENT (OUTPATIENT)
Dept: CARDIOLOGY | Facility: HOSPITAL | Age: 74
DRG: 177 | End: 2024-05-10
Payer: MEDICARE

## 2024-05-10 PROBLEM — I48.0 HYPERCOAGULABLE STATE DUE TO PAROXYSMAL ATRIAL FIBRILLATION (MULTI): Status: ACTIVE | Noted: 2024-05-10

## 2024-05-10 PROBLEM — J18.9 PNEUMONIA: Status: ACTIVE | Noted: 2024-05-10

## 2024-05-10 PROBLEM — J96.21 ACUTE ON CHRONIC RESPIRATORY FAILURE WITH HYPOXIA (MULTI): Status: ACTIVE | Noted: 2024-05-10

## 2024-05-10 PROBLEM — D68.69 HYPERCOAGULABLE STATE DUE TO PAROXYSMAL ATRIAL FIBRILLATION (MULTI): Status: ACTIVE | Noted: 2024-05-10

## 2024-05-10 LAB
ANION GAP SERPL CALC-SCNC: 10 MMOL/L (ref 10–20)
BUN SERPL-MCNC: 15 MG/DL (ref 6–23)
CALCIUM SERPL-MCNC: 8.7 MG/DL (ref 8.6–10.3)
CHLORIDE SERPL-SCNC: 100 MMOL/L (ref 98–107)
CO2 SERPL-SCNC: 39 MMOL/L (ref 21–32)
CREAT SERPL-MCNC: 0.86 MG/DL (ref 0.5–1.3)
EGFRCR SERPLBLD CKD-EPI 2021: >90 ML/MIN/1.73M*2
ERYTHROCYTE [DISTWIDTH] IN BLOOD BY AUTOMATED COUNT: 13.3 % (ref 11.5–14.5)
GLUCOSE SERPL-MCNC: 113 MG/DL (ref 74–99)
HCT VFR BLD AUTO: 41.4 % (ref 41–52)
HGB BLD-MCNC: 12.7 G/DL (ref 13.5–17.5)
MCH RBC QN AUTO: 28.9 PG (ref 26–34)
MCHC RBC AUTO-ENTMCNC: 30.7 G/DL (ref 32–36)
MCV RBC AUTO: 94 FL (ref 80–100)
NRBC BLD-RTO: 0 /100 WBCS (ref 0–0)
PLATELET # BLD AUTO: 220 X10*3/UL (ref 150–450)
POTASSIUM SERPL-SCNC: 4 MMOL/L (ref 3.5–5.3)
RBC # BLD AUTO: 4.4 X10*6/UL (ref 4.5–5.9)
SODIUM SERPL-SCNC: 145 MMOL/L (ref 136–145)
WBC # BLD AUTO: 16.3 X10*3/UL (ref 4.4–11.3)

## 2024-05-10 PROCEDURE — 2500000001 HC RX 250 WO HCPCS SELF ADMINISTERED DRUGS (ALT 637 FOR MEDICARE OP): Performed by: STUDENT IN AN ORGANIZED HEALTH CARE EDUCATION/TRAINING PROGRAM

## 2024-05-10 PROCEDURE — 2060000001 HC INTERMEDIATE ICU ROOM DAILY

## 2024-05-10 PROCEDURE — 93010 ELECTROCARDIOGRAM REPORT: CPT | Performed by: INTERNAL MEDICINE

## 2024-05-10 PROCEDURE — 2500000006 HC RX 250 W HCPCS SELF ADMINISTERED DRUGS (ALT 637 FOR ALL PAYERS): Performed by: STUDENT IN AN ORGANIZED HEALTH CARE EDUCATION/TRAINING PROGRAM

## 2024-05-10 PROCEDURE — 94640 AIRWAY INHALATION TREATMENT: CPT

## 2024-05-10 PROCEDURE — 80048 BASIC METABOLIC PNL TOTAL CA: CPT | Performed by: STUDENT IN AN ORGANIZED HEALTH CARE EDUCATION/TRAINING PROGRAM

## 2024-05-10 PROCEDURE — 2500000005 HC RX 250 GENERAL PHARMACY W/O HCPCS: Performed by: STUDENT IN AN ORGANIZED HEALTH CARE EDUCATION/TRAINING PROGRAM

## 2024-05-10 PROCEDURE — 2500000002 HC RX 250 W HCPCS SELF ADMINISTERED DRUGS (ALT 637 FOR MEDICARE OP, ALT 636 FOR OP/ED): Performed by: STUDENT IN AN ORGANIZED HEALTH CARE EDUCATION/TRAINING PROGRAM

## 2024-05-10 PROCEDURE — 2500000004 HC RX 250 GENERAL PHARMACY W/ HCPCS (ALT 636 FOR OP/ED): Performed by: STUDENT IN AN ORGANIZED HEALTH CARE EDUCATION/TRAINING PROGRAM

## 2024-05-10 PROCEDURE — 36415 COLL VENOUS BLD VENIPUNCTURE: CPT | Performed by: STUDENT IN AN ORGANIZED HEALTH CARE EDUCATION/TRAINING PROGRAM

## 2024-05-10 PROCEDURE — 85027 COMPLETE CBC AUTOMATED: CPT | Performed by: STUDENT IN AN ORGANIZED HEALTH CARE EDUCATION/TRAINING PROGRAM

## 2024-05-10 PROCEDURE — 93005 ELECTROCARDIOGRAM TRACING: CPT

## 2024-05-10 PROCEDURE — 2500000001 HC RX 250 WO HCPCS SELF ADMINISTERED DRUGS (ALT 637 FOR MEDICARE OP)

## 2024-05-10 PROCEDURE — 99233 SBSQ HOSP IP/OBS HIGH 50: CPT | Performed by: INTERNAL MEDICINE

## 2024-05-10 PROCEDURE — S4991 NICOTINE PATCH NONLEGEND: HCPCS | Performed by: STUDENT IN AN ORGANIZED HEALTH CARE EDUCATION/TRAINING PROGRAM

## 2024-05-10 RX ADMIN — IPRATROPIUM BROMIDE AND ALBUTEROL SULFATE 3 ML: 2.5; .5 SOLUTION RESPIRATORY (INHALATION) at 08:42

## 2024-05-10 RX ADMIN — METHYLPREDNISOLONE SODIUM SUCCINATE 40 MG: 40 INJECTION, POWDER, FOR SOLUTION INTRAMUSCULAR; INTRAVENOUS at 07:07

## 2024-05-10 RX ADMIN — Medication 2.5 L/MIN: at 08:00

## 2024-05-10 RX ADMIN — FLUTICASONE PROPIONATE 2 SPRAY: 50 SPRAY, METERED NASAL at 20:24

## 2024-05-10 RX ADMIN — ALBUTEROL SULFATE 2.5 MG: 2.5 SOLUTION RESPIRATORY (INHALATION) at 04:12

## 2024-05-10 RX ADMIN — IPRATROPIUM BROMIDE AND ALBUTEROL SULFATE 3 ML: 2.5; .5 SOLUTION RESPIRATORY (INHALATION) at 00:48

## 2024-05-10 RX ADMIN — PANTOPRAZOLE SODIUM 40 MG: 40 TABLET, DELAYED RELEASE ORAL at 09:04

## 2024-05-10 RX ADMIN — IPRATROPIUM BROMIDE AND ALBUTEROL SULFATE 3 ML: 2.5; .5 SOLUTION RESPIRATORY (INHALATION) at 16:34

## 2024-05-10 RX ADMIN — RIVAROXABAN 15 MG: 15 TABLET, FILM COATED ORAL at 18:17

## 2024-05-10 RX ADMIN — NEBIVOLOL HYDROCHLORIDE 2.5 MG: 2.5 TABLET ORAL at 20:23

## 2024-05-10 RX ADMIN — METHYLPREDNISOLONE SODIUM SUCCINATE 40 MG: 40 INJECTION, POWDER, FOR SOLUTION INTRAMUSCULAR; INTRAVENOUS at 20:23

## 2024-05-10 RX ADMIN — BENZONATATE 100 MG: 100 CAPSULE ORAL at 20:20

## 2024-05-10 RX ADMIN — CEFTRIAXONE SODIUM 2 G: 2 INJECTION, SOLUTION INTRAVENOUS at 23:02

## 2024-05-10 RX ADMIN — ALBUTEROL SULFATE 2.5 MG: 2.5 SOLUTION RESPIRATORY (INHALATION) at 20:11

## 2024-05-10 RX ADMIN — Medication 2 L/MIN: at 12:10

## 2024-05-10 RX ADMIN — TAMSULOSIN HYDROCHLORIDE 0.4 MG: 0.4 CAPSULE ORAL at 20:21

## 2024-05-10 RX ADMIN — Medication 2 L/MIN: at 16:38

## 2024-05-10 RX ADMIN — PANTOPRAZOLE SODIUM 40 MG: 40 TABLET, DELAYED RELEASE ORAL at 14:32

## 2024-05-10 RX ADMIN — LOPERAMIDE HYDROCHLORIDE 2 MG: 2 CAPSULE ORAL at 12:14

## 2024-05-10 RX ADMIN — ALBUTEROL SULFATE 2.5 MG: 2.5 SOLUTION RESPIRATORY (INHALATION) at 12:40

## 2024-05-10 RX ADMIN — BUDESONIDE 0.5 MG: 0.5 INHALANT RESPIRATORY (INHALATION) at 08:42

## 2024-05-10 RX ADMIN — Medication 1 CAPSULE: at 09:04

## 2024-05-10 RX ADMIN — NICOTINE 1 PATCH: 21 PATCH, EXTENDED RELEASE TRANSDERMAL at 09:04

## 2024-05-10 RX ADMIN — BUDESONIDE 0.5 MG: 0.5 INHALANT RESPIRATORY (INHALATION) at 20:11

## 2024-05-10 RX ADMIN — IPRATROPIUM BROMIDE AND ALBUTEROL SULFATE 3 ML: 2.5; .5 SOLUTION RESPIRATORY (INHALATION) at 23:38

## 2024-05-10 RX ADMIN — ROFLUMILAST 500 MCG: 500 TABLET ORAL at 20:21

## 2024-05-10 RX ADMIN — FLUTICASONE PROPIONATE 2 SPRAY: 50 SPRAY, METERED NASAL at 09:04

## 2024-05-10 RX ADMIN — Medication 1 CAPSULE: at 20:21

## 2024-05-10 ASSESSMENT — PAIN SCALES - GENERAL
PAINLEVEL_OUTOF10: 0 - NO PAIN

## 2024-05-10 ASSESSMENT — PAIN - FUNCTIONAL ASSESSMENT
PAIN_FUNCTIONAL_ASSESSMENT: 0-10

## 2024-05-10 NOTE — PROGRESS NOTES
"Raheel Cloud is a 73 y.o. male on day 6 of admission presenting with COPD exacerbation (Multi).      Subjective   Patient states he is doing \"100% better\" but still far from his baseline. Has ongoing dyspnea and wet productive cough. Also still requiring HFNC overnight to get 4hrs of sleep. Has not been able to sleep at night without this and unfortunately cannot be done at home. He denies fever/chills. He is ambulating inside his room ok. He wishes to get out of the hospital as soon as possible.    Objective     Last Recorded Vitals  /50   Pulse 80   Temp 36.1 °C (97 °F) (Temporal)   Resp (!) 32   Wt 66.2 kg (145 lb 15.1 oz)   SpO2 94%   Intake/Output last 3 Shifts:  No intake or output data in the 24 hours ending 05/10/24 1122    Admission Weight  Weight: 64.9 kg (143 lb) (05/04/24 1452)    Daily Weight  05/09/24 : 66.2 kg (145 lb 15.1 oz)    Image Results  Electrocardiogram, 12-lead PRN ACS symptoms  Normal sinus rhythm  Normal ECG  When compared with ECG of 04-MAY-2024 15:51,  No significant change was found  Confirmed by Av Servin ([id number]) on 5/8/2024 6:04:26 PM      Physical Exam  Constitutional:       General: He is not in acute distress.     Appearance: Normal appearance.   HENT:      Head: Normocephalic and atraumatic.      Mouth/Throat:      Mouth: Mucous membranes are moist.   Eyes:      Extraocular Movements: Extraocular movements intact.      Conjunctiva/sclera: Conjunctivae normal.   Pulmonary:      Effort: Pulmonary effort is normal.      Breath sounds: Wheezing (end-expiratory) present. No rhonchi or rales.      Comments: diminished  Musculoskeletal:         General: No swelling or tenderness. Normal range of motion.      Cervical back: Normal range of motion.   Skin:     General: Skin is warm and dry.      Findings: No rash.   Neurological:      General: No focal deficit present.      Mental Status: He is alert. Mental status is at baseline.      Cranial Nerves: No cranial nerve " deficit.      Sensory: No sensory deficit.   Psychiatric:         Mood and Affect: Mood normal.         Behavior: Behavior normal.         Relevant Results  Scheduled medications  albuterol, 2.5 mg, nebulization, q8h  benzonatate, 100 mg, oral, Nightly  budesonide, 0.5 mg, nebulization, BID  cefTRIAXone, 2 g, intravenous, q24h  fluticasone, 2 spray, Each Nostril, BID  ipratropium-albuteroL, 3 mL, nebulization, q8h  lactobacillus acidophilus, 1 capsule, oral, BID  melatonin, 5 mg, oral, Daily  methylPREDNISolone sodium succinate (PF), 40 mg, intravenous, q12h  nebivolol, 2.5 mg, oral, Daily  nicotine, 1 patch, transdermal, Daily  pantoprazole, 40 mg, oral, BID  rivaroxaban, 15 mg, oral, Daily with evening meal  roflumilast, 500 mcg, oral, Daily  tamsulosin, 0.4 mg, oral, Nightly      Continuous medications     PRN medications  PRN medications: acetaminophen, albuterol, ammonium lactate, benzocaine-menthol, benzonatate, bismuth subsalicylate, dextromethorphan-guaifenesin, loperamide, nicotine polacrilex, ondansetron, oxygen, oxygen, sodium chloride    Results for orders placed or performed during the hospital encounter of 05/04/24 (from the past 24 hour(s))   CBC   Result Value Ref Range    WBC 16.3 (H) 4.4 - 11.3 x10*3/uL    nRBC 0.0 0.0 - 0.0 /100 WBCs    RBC 4.40 (L) 4.50 - 5.90 x10*6/uL    Hemoglobin 12.7 (L) 13.5 - 17.5 g/dL    Hematocrit 41.4 41.0 - 52.0 %    MCV 94 80 - 100 fL    MCH 28.9 26.0 - 34.0 pg    MCHC 30.7 (L) 32.0 - 36.0 g/dL    RDW 13.3 11.5 - 14.5 %    Platelets 220 150 - 450 x10*3/uL   Basic Metabolic Panel   Result Value Ref Range    Glucose 113 (H) 74 - 99 mg/dL    Sodium 145 136 - 145 mmol/L    Potassium 4.0 3.5 - 5.3 mmol/L    Chloride 100 98 - 107 mmol/L    Bicarbonate 39 (H) 21 - 32 mmol/L    Anion Gap 10 10 - 20 mmol/L    Urea Nitrogen 15 6 - 23 mg/dL    Creatinine 0.86 0.50 - 1.30 mg/dL    eGFR >90 >60 mL/min/1.73m*2    Calcium 8.7 8.6 - 10.3 mg/dL       Electrocardiogram, 12-lead PRN ACS  symptoms    Result Date: 5/8/2024  Normal sinus rhythm Normal ECG When compared with ECG of 04-MAY-2024 15:51, No significant change was found Confirmed by Av Servin ([id number]) on 5/8/2024 6:04:26 PM    Transthoracic Echo (TTE) Limited    Result Date: 5/7/2024            [facility] [place] [address] [facility], [facility] [address]    Tel [phone] Fax [phone] TRANSTHORACIC ECHOCARDIOGRAM REPORT  Patient Name:     KENNA Pardo Physician:   [id number] Paul Ibrahim MD Study Date:       5/6/2024             Ordering Provider:   [id number] KADIE LAWTON MRN/PID:          [id number]             Fellow: Accession#:       [id number]         Nurse: Date of           1950 / 73 years Sonographer:         Cielo Myers RDCS Birth/Age: Gender:           M                    Additional Staff: Height:           175.26 cm            Admit Date: Weight:           68.49 kg             Admission Status:    Inpatient - Routine BSA / BMI:        1.83 m2 / 22.30      Department Location: [facility] Echo [facility]                   kg/m2 Blood Pressure: 132 /57 mmHg Study Type:    TRANSTHORACIC ECHO (TTE) LIMITED Diagnosis/ICD: Supraventricular tachycardia-I47.1 Indication:    Vtach CPT Codes:     Echo Limited-24211  Study Detail: The following Echo studies were performed: 2D, Doppler and color               flow. Technically challenging study due to poor acoustic windows               and prominent lung artifact.  PHYSICIAN INTERPRETATION: Left Ventricle: Left ventricular systolic function is normal, with an estimated ejection fraction of 55%. Wall motion is abnormal. The left ventricular cavity size is normal. Spectral Doppler shows an impaired relaxation pattern of left ventricular diastolic filling. LV Wall Scoring: The basal and mid inferior septum and mid anteroseptal segment are hypokinetic. All  remaining scored segments are normal. Left Atrium: The left atrium is normal in size. Right Ventricle: The right ventricle is normal in size. There is normal right ventricular global systolic function. Right Atrium: The right atrium is normal in size. Aortic Valve: The aortic valve is trileaflet. Aortic valve regurgitation was not assessed. Mitral Valve: The mitral valve is normal in structure. There is trace mitral valve regurgitation. Tricuspid Valve: The tricuspid valve is structurally normal. There is trace tricuspid regurgitation. Pulmonic Valve: The pulmonic valve is not well visualized. The pulmonic valve regurgitation was not assessed. Pericardium: There is a trivial pericardial effusion. Aorta: The aortic root is normal. Systemic Veins: The inferior vena cava appears to be of normal size. There is IVC inspiratory collapse greater than 50%.  CONCLUSIONS:  1. Left ventricular systolic function is normal with a 55% estimated ejection fraction.  2. Basal and mid inferior septum and mid anteroseptal segment are abnormal.  3. Spectral Doppler shows an impaired relaxation pattern of left ventricular diastolic filling. QUANTITATIVE DATA SUMMARY: 2D MEASUREMENTS:                           Normal Ranges: IVSd:          0.79 cm    (0.6-1.1cm) LVPWd:         1.15 cm    (0.6-1.1cm) LVIDd:         4.50 cm    (3.9-5.9cm) LV Mass Index: 119.8 g/m2 LV SYSTOLIC FUNCTION BY 2D PLANIMETRY (MOD):                     Normal Ranges: EF-A4C View: 52.9 % (>=55%) LV DIASTOLIC FUNCTION:                     Normal Ranges: MV Peak E: 0.83 m/s (0.7-1.2 m/s) MV Peak A: 1.16 m/s (0.42-0.7 m/s) E/A Ratio: 0.71     (1.0-2.2) MITRAL VALVE:                 Normal Ranges: MV DT: 134 msec (150-240msec)  [id number] Paul Ibrahim MD Electronically signed on 5/7/2024 at 8:59:49 AM  Wall Scoring  ** Final **     ECG 12 lead    Result Date: 5/5/2024  Normal sinus rhythm Rightward axis Borderline ECG When compared with ECG of 04-MAY-2024 14:34,  (unconfirmed) No significant change was found Confirmed by Sedrick Myles ([id number]) on 5/5/2024 2:20:22 PM    ECG 12 lead    Result Date: 5/5/2024  Sinus rhythm with sinus arrhythmia Borderline Low voltage in frontal leads Otherwise normal ECG When compared with ECG of 20-MAY-2023 23:55, No significant change was found Confirmed by Sedrick Myles ([id number]) on 5/5/2024 2:19:50 PM    CT chest wo IV contrast    Result Date: 5/5/2024  Interpreted By:  Schoenberger, Joseph, STUDY: CT CHEST WO IV CONTRAST;  5/5/2024 8:53 am   INDICATION: Signs/Symptoms:Pneumonia vs viral bronchitis/AECOPD, hx of multiple pulmnary nodules.   COMPARISON: 05/21/2023   ACCESSION NUMBER(S): [id number]   ORDERING CLINICIAN: ROB CORREA   TECHNIQUE: Helical data acquisition of the chest was obtained  without IV contrast material.  Images were reformatted in axial, coronal, and sagittal planes.   FINDINGS: LUNGS AND AIRWAYS: The trachea and central airways are patent. No endobronchial lesion.   In the interval since the prior exam, there are multifocal peribronchovascular ground-glass opacities which are new from the prior exam. There are nonspecific though viral infectious etiology is consideration. There is no pleural effusion or pneumothorax or airspace consolidation. There are moderate 2 severe findings of some upper lung predominant centrilobular emphysema. The bulb lung nodule in the periphery of the anterolateral lower right lower lobe is stable from the prior measuring 12 mm in longitudinal dimension.   MEDIASTINUM AND CAROLYN, LOWER NECK AND AXILLA: The visualized thyroid gland is within normal limits.   No evidence of thoracic lymphadenopathy by CT criteria.   Esophagus appears within normal limits as seen.   HEART AND VESSELS: The thoracic aorta is normal in course and caliber with mild to moderate atherosclerotic calcifications.   Main pulmonary artery and its branches are normal in caliber.   There are moderate coronary  atherosclerotic calcifications. The study is not optimized for evaluation of coronary arteries.   There is no enlargement of cardiac chambers.   There is trace pericardial effusion.   UPPER ABDOMEN: The images of the upper abdomen are unremarkable except for right renal cysts also apparent previously.   CHEST WALL AND OSSEOUS STRUCTURES: There are no suspicious osseous lesions. Multilevel degenerative changes are present       1.  In interval development of of peribronchial vascular ground-glass infiltrates superimposed upon emphysematous changes as detailed above. See discussion above   MACRO: None   Signed by: Joseph Schoenberger 5/5/2024 1:43 PM Dictation workstation:   [id number]    XR chest 1 view    Result Date: 5/4/2024  Interpreted By:  Alli Castellanos, STUDY: XR CHEST 1 VIEW  5/4/2024 2:46 pm   INDICATION: Signs/Symptoms:Chest Pain   COMPARISON: 05/21/2023   ACCESSION NUMBER(S): [id number]   ORDERING CLINICIAN: BESS TIWARI   TECHNIQUE: 2 AP portable radiographs of the chest were obtained   FINDINGS: Multiple cardiac monitoring leads are seen over the chest.   No focal infiltrate, pleural effusion or pneumothorax is identified. The cardiac silhouette is within normal limits for size.       No focal infiltrate or pneumothorax is identified.   MACRO: None.   Signed by: Alli Castellanos 5/4/2024 3:17 PM Dictation workstation:   [id number]       Assessment/Plan   Principal Problem:    COPD exacerbation (Multi)  Active Problems:    Acute on chronic respiratory failure with hypoxia (Multi)    Pneumonia    BPH (benign prostatic hyperplasia)    Paroxysmal atrial fibrillation (Multi)    Hypercoagulable state due to paroxysmal atrial fibrillation (Multi)    -patient on 3L statting well  -continue steroids; will defer to pulmonology to wean to once daily dosing; currently on BID dosing  -on Rocephin; unclear if has bacterial PNA; sputum cx with saliva; likely can DC soon; will defer to pulmonology on this as  rk  -HFNC at bedtime for help with sleep; not tolerating bipap; flutter valve difficult to use    Code: full  DVT Ppx: Xarelto  GI PPX: not indicated  Diet: regular    Kevin Israel MD

## 2024-05-10 NOTE — CARE PLAN
The patient's goals for the shift include to not be transferred to ICU    The clinical goals for the shift include Pt will report improvement in SOB by the end of the shift    Problem: Pain - Adult  Goal: Verbalizes/displays adequate comfort level or baseline comfort level  5/10/2024 1647 by Zoe Lopez RN  Outcome: Progressing  5/10/2024 1645 by Zoe Lopez RN  Reactivated     Problem: Safety - Adult  Goal: Free from fall injury  5/10/2024 1647 by Zoe Lopez RN  Outcome: Progressing  5/10/2024 1645 by Zoe Lopez RN  Reactivated     Problem: Discharge Planning  Goal: Discharge to home or other facility with appropriate resources  Outcome: Progressing     Problem: Chronic Conditions and Co-morbidities  Goal: Patient's chronic conditions and co-morbidity symptoms are monitored and maintained or improved  Outcome: Progressing     Problem: Respiratory  Goal: Minimal/no exertional discomfort or dyspnea this shift  Outcome: Progressing  Goal: No signs of respiratory distress (eg. Use of accessory muscles. Peds grunting)  Outcome: Progressing  Goal: Tolerate pulmonary toileting this shift  Outcome: Progressing  Goal: Verbalize decreased shortness of breath this shift  Outcome: Progressing  Goal: Wean oxygen to maintain O2 saturation per order/standard this shift  Outcome: Progressing     Problem: Fall/Injury  Goal: Not fall by end of shift  Outcome: Progressing  Goal: Be free from injury by end of the shift  Outcome: Progressing  Goal: Verbalize understanding of personal risk factors for fall in the hospital  Outcome: Progressing  Goal: Verbalize understanding of risk factor reduction measures to prevent injury from fall in the home  Outcome: Progressing  Goal: Use assistive devices by end of the shift  Outcome: Progressing  Goal: Pace activities to prevent fatigue by end of the shift  Outcome: Progressing

## 2024-05-10 NOTE — PROGRESS NOTES
INPATIENT PULMONARY  PROGRESS NOTES    PATIENT NAME: Raheel Cloud    MRN: [id number]  SERVICE DATE:  5/10/2024   SERVICE TIME:  5:55 PM        ASSESSMENT :      COPD exacerbation (Multi)  Acute on chronic hypoxic hypercapnic respiratory failure   COPD and bronchiectasis exacerbation (Multi), ~stage IV COPD  Hx bronchiectasis + chronic bronchitis on home treatment with vest therapy and bronchodilators.  Atypical vs Viral PNA?  Nicotine use hx  Diarrhea s/p antibiotics  pAfib on xarelto   Arrhythmia ?  GERD      Improving overall but not yet ready for discharge  However still requiring high flow oxygen to sleep overnight  Had poor sleep for the last few days and difficult to lay down flat in bed without high flow  Improved sputum production with the chest vest    Continue with the current therapy  Continue with bronchodilators as ordered  Continue the current antibiotics    PLAN:      SUBJECTIVE  Afebrile  No events overnight     REVIEW OF SYSTEMS    GENERAL: no pain fever, fatigue , chills night sweats or weight loss  Head and neck: no pain or swelling  Chest : as per HPI  Card ; no h/o CAD, CHF , A FIB. KNOWN WITH HTN. NO chest pain, orthopnea or paroxysmal noct dyspnea  Abdomen no abd pain, swelling or change in bowel habits, regular, no constipation or diarrhea  Neuro , no cahne in awakeness, or confusion, no weakness numbness headache or change in vision  Extremities; no leg swelling, pain or change in color   Vascular, NO H/O pvd  Endocrine: h/o DM, HYPOTHYROID           Scheduled medications  albuterol, 2.5 mg, nebulization, q8h  benzonatate, 100 mg, oral, Nightly  budesonide, 0.5 mg, nebulization, BID  cefTRIAXone, 2 g, intravenous, q24h  fluticasone, 2 spray, Each Nostril, BID  ipratropium-albuteroL, 3 mL, nebulization, q8h  lactobacillus acidophilus, 1 capsule, oral, BID  melatonin, 5 mg, oral, Daily  methylPREDNISolone sodium succinate (PF), 40 mg, intravenous, q12h  nebivolol, 2.5 mg, oral, Daily  nicotine,  1 patch, transdermal, Daily  pantoprazole, 40 mg, oral, BID  rivaroxaban, 15 mg, oral, Daily with evening meal  roflumilast, 500 mcg, oral, Daily  tamsulosin, 0.4 mg, oral, Nightly      Continuous medications     PRN medications  PRN medications: acetaminophen, albuterol, ammonium lactate, benzocaine-menthol, benzonatate, bismuth subsalicylate, dextromethorphan-guaifenesin, loperamide, nicotine polacrilex, ondansetron, oxygen, oxygen, sodium chloride         OBJECTIVE    Physical Exam  Vitals reviewed.   Constitutional:       Appearance: Normal appearance.   HENT:      Head: Normocephalic and atraumatic.   Eyes:      Extraocular Movements: Extraocular movements intact.      Pupils: Pupils are equal, round, and reactive to light.   Cardiovascular:      Rate and Rhythm: Normal rate and regular rhythm.      Heart sounds: No murmur heard.  Pulmonary:      Effort: No significant respiratory distress present.  At rest     Breath sounds: No stridor. Wheezing and rhonchi present.   Chest:      Chest wall: No tenderness.   Abdominal:      General: Bowel sounds are normal. There is no distension.      Palpations: There is no mass.      Tenderness: There is no abdominal tenderness. There is no rebound.   Musculoskeletal:         General: Mild bilateral lower swelling present. No tenderness or deformity.      Cervical back: No rigidity.   Lymphadenopathy:      Cervical: No cervical adenopathy.   Skin:     General: Skin is warm.      Coloration: Skin is pale. Skin is not jaundiced.      Findings: No bruising.   Neurological:      General: No focal deficit present.      Mental Status: She is alert and oriented to person, place, and time. Mental status is at baseline.      Cranial Nerves: No cranial nerve deficit.      Motor: Weakness present.   Psychiatric:         Mood and Affect: Mood normal.         Behavior: Behavior normal.     Patient Vitals for the past 24 hrs:   BP Temp Temp src Pulse Resp SpO2   05/10/24 1638 152/55 36.4  °C (97.5 °F) Temporal 94 -- 96 %   05/10/24 1210 (!) 120/40 36.4 °C (97.5 °F) Temporal 76 -- 95 %   05/10/24 0800 140/50 36.1 °C (97 °F) Temporal 80 (!) 32 94 %   05/10/24 0000 148/57 36.2 °C (97.2 °F) -- 94 22 96 %   05/09/24 2059 142/66 36.4 °C (97.5 °F) Temporal 79 20 --             Gen: NAD  Neck: symmetric, no mass  Cardiovascular: RRR  Respiratory: No distress   GI: Abd soft, nontender, non-distended  Extremities: no leg edema  Skin: Warm and dry.  Neuro: alert and oriented times 3    Labs:  Lab Results   Component Value Date    WBC 16.3 (H) 05/10/2024    HGB 12.7 (L) 05/10/2024    HCT 41.4 05/10/2024    MCV 94 05/10/2024     05/10/2024     Lab Results   Component Value Date    GLUCOSE 113 (H) 05/10/2024    CALCIUM 8.7 05/10/2024     05/10/2024    K 4.0 05/10/2024    CO2 39 (H) 05/10/2024     05/10/2024    BUN 15 05/10/2024    CREATININE 0.86 05/10/2024   ESR: --  Lab Results   Component Value Date    SEDRATE 11 05/05/2023     Lab Results   Component Value Date    CRP 11.87 (A) 06/10/2020     Lab Results   Component Value Date    ALT 28 05/04/2024    AST 22 05/04/2024    ALKPHOS 48 05/04/2024    BILITOT 0.8 05/04/2024           DATA:   Diagnostic tests reviewed for today's visit:    Labs this admission reviewed  Imagings this admission reviewed  Cultures: Reviewed    Transthoracic Echo (TTE) Limited   Final Result      CT chest wo IV contrast   Final Result   1.  In interval development of of peribronchial vascular ground-glass   infiltrates superimposed upon emphysematous changes as detailed   above. See discussion above        MACRO:   None        Signed by: Joseph Schoenberger 5/5/2024 1:43 PM   Dictation workstation:   [id number]      XR chest 1 view   Final Result   No focal infiltrate or pneumothorax is identified.        MACRO:   None.        Signed by: Alli Castellanos 5/4/2024 3:17 PM   Dictation workstation:   [id number]           Aracelis Velazquez MD

## 2024-05-10 NOTE — NURSING NOTE
Patient and family concerned regarding the ST segment monitor.  Patient asked for 12 lead to be done, no c/o chest pain or any other cardiac symptoms at this time.  12 lead done and compared with prior with no changes. Dr Richter notified as well.

## 2024-05-11 LAB
ANION GAP SERPL CALC-SCNC: 10 MMOL/L (ref 10–20)
BUN SERPL-MCNC: 19 MG/DL (ref 6–23)
CALCIUM SERPL-MCNC: 8.5 MG/DL (ref 8.6–10.3)
CHLORIDE SERPL-SCNC: 102 MMOL/L (ref 98–107)
CO2 SERPL-SCNC: 35 MMOL/L (ref 21–32)
CREAT SERPL-MCNC: 0.9 MG/DL (ref 0.5–1.3)
EGFRCR SERPLBLD CKD-EPI 2021: 90 ML/MIN/1.73M*2
ERYTHROCYTE [DISTWIDTH] IN BLOOD BY AUTOMATED COUNT: 13.5 % (ref 11.5–14.5)
GLUCOSE SERPL-MCNC: 145 MG/DL (ref 74–99)
HCT VFR BLD AUTO: 39.8 % (ref 41–52)
HGB BLD-MCNC: 12.1 G/DL (ref 13.5–17.5)
MAGNESIUM SERPL-MCNC: 2.3 MG/DL (ref 1.6–2.4)
MCH RBC QN AUTO: 28.7 PG (ref 26–34)
MCHC RBC AUTO-ENTMCNC: 30.4 G/DL (ref 32–36)
MCV RBC AUTO: 95 FL (ref 80–100)
NRBC BLD-RTO: 0 /100 WBCS (ref 0–0)
PLATELET # BLD AUTO: 204 X10*3/UL (ref 150–450)
POTASSIUM SERPL-SCNC: 4.2 MMOL/L (ref 3.5–5.3)
RBC # BLD AUTO: 4.21 X10*6/UL (ref 4.5–5.9)
SODIUM SERPL-SCNC: 143 MMOL/L (ref 136–145)
WBC # BLD AUTO: 16.9 X10*3/UL (ref 4.4–11.3)

## 2024-05-11 PROCEDURE — 83735 ASSAY OF MAGNESIUM: CPT | Performed by: INTERNAL MEDICINE

## 2024-05-11 PROCEDURE — 2500000006 HC RX 250 W HCPCS SELF ADMINISTERED DRUGS (ALT 637 FOR ALL PAYERS): Performed by: STUDENT IN AN ORGANIZED HEALTH CARE EDUCATION/TRAINING PROGRAM

## 2024-05-11 PROCEDURE — 36415 COLL VENOUS BLD VENIPUNCTURE: CPT | Performed by: INTERNAL MEDICINE

## 2024-05-11 PROCEDURE — 2500000001 HC RX 250 WO HCPCS SELF ADMINISTERED DRUGS (ALT 637 FOR MEDICARE OP): Performed by: STUDENT IN AN ORGANIZED HEALTH CARE EDUCATION/TRAINING PROGRAM

## 2024-05-11 PROCEDURE — 99233 SBSQ HOSP IP/OBS HIGH 50: CPT | Performed by: INTERNAL MEDICINE

## 2024-05-11 PROCEDURE — 94660 CPAP INITIATION&MGMT: CPT

## 2024-05-11 PROCEDURE — 2500000001 HC RX 250 WO HCPCS SELF ADMINISTERED DRUGS (ALT 637 FOR MEDICARE OP)

## 2024-05-11 PROCEDURE — 5A0945A ASSISTANCE WITH RESPIRATORY VENTILATION, 24-96 CONSECUTIVE HOURS, HIGH NASAL FLOW/VELOCITY: ICD-10-PCS | Performed by: INTERNAL MEDICINE

## 2024-05-11 PROCEDURE — 2500000002 HC RX 250 W HCPCS SELF ADMINISTERED DRUGS (ALT 637 FOR MEDICARE OP, ALT 636 FOR OP/ED): Performed by: STUDENT IN AN ORGANIZED HEALTH CARE EDUCATION/TRAINING PROGRAM

## 2024-05-11 PROCEDURE — 2500000005 HC RX 250 GENERAL PHARMACY W/O HCPCS: Performed by: INTERNAL MEDICINE

## 2024-05-11 PROCEDURE — 94640 AIRWAY INHALATION TREATMENT: CPT

## 2024-05-11 PROCEDURE — 85027 COMPLETE CBC AUTOMATED: CPT | Performed by: INTERNAL MEDICINE

## 2024-05-11 PROCEDURE — 2500000005 HC RX 250 GENERAL PHARMACY W/O HCPCS: Performed by: STUDENT IN AN ORGANIZED HEALTH CARE EDUCATION/TRAINING PROGRAM

## 2024-05-11 PROCEDURE — 80048 BASIC METABOLIC PNL TOTAL CA: CPT | Performed by: INTERNAL MEDICINE

## 2024-05-11 PROCEDURE — 2060000001 HC INTERMEDIATE ICU ROOM DAILY

## 2024-05-11 PROCEDURE — 94668 MNPJ CHEST WALL SBSQ: CPT

## 2024-05-11 PROCEDURE — S4991 NICOTINE PATCH NONLEGEND: HCPCS | Performed by: STUDENT IN AN ORGANIZED HEALTH CARE EDUCATION/TRAINING PROGRAM

## 2024-05-11 RX ADMIN — Medication 3 L/MIN: at 12:18

## 2024-05-11 RX ADMIN — Medication 3 L/MIN: at 08:00

## 2024-05-11 RX ADMIN — BUDESONIDE 0.5 MG: 0.5 INHALANT RESPIRATORY (INHALATION) at 08:36

## 2024-05-11 RX ADMIN — NEBIVOLOL HYDROCHLORIDE 2.5 MG: 2.5 TABLET ORAL at 20:15

## 2024-05-11 RX ADMIN — ALBUTEROL SULFATE 2.5 MG: 2.5 SOLUTION RESPIRATORY (INHALATION) at 12:18

## 2024-05-11 RX ADMIN — ALBUTEROL SULFATE 2.5 MG: 2.5 SOLUTION RESPIRATORY (INHALATION) at 20:29

## 2024-05-11 RX ADMIN — PANTOPRAZOLE SODIUM 40 MG: 40 TABLET, DELAYED RELEASE ORAL at 16:48

## 2024-05-11 RX ADMIN — BENZONATATE 100 MG: 100 CAPSULE ORAL at 20:14

## 2024-05-11 RX ADMIN — FLUTICASONE PROPIONATE 2 SPRAY: 50 SPRAY, METERED NASAL at 09:17

## 2024-05-11 RX ADMIN — IPRATROPIUM BROMIDE AND ALBUTEROL SULFATE 3 ML: 2.5; .5 SOLUTION RESPIRATORY (INHALATION) at 23:29

## 2024-05-11 RX ADMIN — PANTOPRAZOLE SODIUM 40 MG: 40 TABLET, DELAYED RELEASE ORAL at 09:17

## 2024-05-11 RX ADMIN — FLUTICASONE PROPIONATE 2 SPRAY: 50 SPRAY, METERED NASAL at 20:14

## 2024-05-11 RX ADMIN — ROFLUMILAST 500 MCG: 500 TABLET ORAL at 20:14

## 2024-05-11 RX ADMIN — NICOTINE 1 PATCH: 21 PATCH, EXTENDED RELEASE TRANSDERMAL at 09:17

## 2024-05-11 RX ADMIN — TAMSULOSIN HYDROCHLORIDE 0.4 MG: 0.4 CAPSULE ORAL at 20:14

## 2024-05-11 RX ADMIN — ALBUTEROL SULFATE 2.5 MG: 2.5 SOLUTION RESPIRATORY (INHALATION) at 03:48

## 2024-05-11 RX ADMIN — Medication 1 CAPSULE: at 20:14

## 2024-05-11 RX ADMIN — LOPERAMIDE HYDROCHLORIDE 2 MG: 2 CAPSULE ORAL at 16:48

## 2024-05-11 RX ADMIN — RIVAROXABAN 15 MG: 15 TABLET, FILM COATED ORAL at 16:48

## 2024-05-11 RX ADMIN — Medication 3 L/MIN: at 08:39

## 2024-05-11 RX ADMIN — Medication 1 CAPSULE: at 09:17

## 2024-05-11 RX ADMIN — Medication 3 L/MIN: at 16:25

## 2024-05-11 RX ADMIN — Medication 3 L/MIN: at 12:00

## 2024-05-11 RX ADMIN — BUDESONIDE 0.5 MG: 0.5 INHALANT RESPIRATORY (INHALATION) at 20:29

## 2024-05-11 RX ADMIN — IPRATROPIUM BROMIDE AND ALBUTEROL SULFATE 3 ML: 2.5; .5 SOLUTION RESPIRATORY (INHALATION) at 16:21

## 2024-05-11 RX ADMIN — IPRATROPIUM BROMIDE AND ALBUTEROL SULFATE 3 ML: 2.5; .5 SOLUTION RESPIRATORY (INHALATION) at 08:07

## 2024-05-11 ASSESSMENT — COGNITIVE AND FUNCTIONAL STATUS - GENERAL
MOBILITY SCORE: 24
DAILY ACTIVITIY SCORE: 24

## 2024-05-11 ASSESSMENT — PAIN - FUNCTIONAL ASSESSMENT
PAIN_FUNCTIONAL_ASSESSMENT: 0-10

## 2024-05-11 ASSESSMENT — PAIN SCALES - GENERAL
PAINLEVEL_OUTOF10: 0 - NO PAIN

## 2024-05-11 NOTE — CARE PLAN
The patient's goals for the shift include to not be transferred to ICU    The clinical goals for the shift include pt will exhibit less oxygen demand by the end of the shift    Over the shift, the patient did not make progress toward the following goals. Barriers to progression include unable to wean O2 at this time. Recommendations to address these barriers include continue plan of care  Problem: Pain - Adult  Goal: Verbalizes/displays adequate comfort level or baseline comfort level  Outcome: Progressing     Problem: Safety - Adult  Goal: Free from fall injury  Outcome: Progressing     Problem: Respiratory  Goal: Minimal/no exertional discomfort or dyspnea this shift  Outcome: Progressing  Goal: No signs of respiratory distress (eg. Use of accessory muscles. Peds grunting)  Outcome: Progressing     Problem: Fall/Injury  Goal: Not fall by end of shift  Outcome: Progressing   .

## 2024-05-11 NOTE — CARE PLAN
Problem: Pain - Adult  Goal: Verbalizes/displays adequate comfort level or baseline comfort level  Outcome: Progressing     Problem: Safety - Adult  Goal: Free from fall injury  Outcome: Progressing     Problem: Discharge Planning  Goal: Discharge to home or other facility with appropriate resources  Outcome: Progressing     Problem: Chronic Conditions and Co-morbidities  Goal: Patient's chronic conditions and co-morbidity symptoms are monitored and maintained or improved  Outcome: Progressing     Problem: Respiratory  Goal: Minimal/no exertional discomfort or dyspnea this shift  Outcome: Progressing  Goal: No signs of respiratory distress (eg. Use of accessory muscles. Peds grunting)  Outcome: Progressing  Goal: Verbalize decreased shortness of breath this shift  Outcome: Progressing  Goal: Wean oxygen to maintain O2 saturation per order/standard this shift  Outcome: Progressing     Problem: Fall/Injury  Goal: Not fall by end of shift  Outcome: Progressing  Goal: Be free from injury by end of the shift  Outcome: Progressing  Goal: Verbalize understanding of personal risk factors for fall in the hospital  Outcome: Progressing  Goal: Verbalize understanding of risk factor reduction measures to prevent injury from fall in the home  Outcome: Progressing  Goal: Pace activities to prevent fatigue by end of the shift  Outcome: Progressing   The patient's goals for the shift include to not be transferred to ICU    The clinical goals for the shift include pt will exhibit less oxygen demand by the end of the shift    Over the shift, the patient did not make progress toward the following goals. Barriers to progression include Used high flow oxygen last night. Recommendations to address these barriers include Monitor tonight to see if he can go without.

## 2024-05-11 NOTE — NURSING NOTE
0900-son at bedside-patient's dental crown came off, he had called their dentist who recommended a  to put it back in place. Son tried, patient held the crown in place but it would not stay on. Coram is in patient's belongings.     1400-requested removal of IV's so he could shower. Removed one, other still patent and explained need for IV solumedrol tonight. He showered independently, shave while sitting back in bed.   1530-rounded and noticed that he had the high flow cannula back on and was resting. Asked him if he did this himself, he confirmed. Mentioned that they wanted him to be without it for possible discharge. He said he only wanted it for a while before his next treatment and will try not to  use it tonight.

## 2024-05-11 NOTE — PROGRESS NOTES
Raheel Cloud is a 73 y.o. male on day 7 of admission presenting with COPD exacerbation (Multi).      Subjective   Patient doing ok but overnight had to use the HFNC. Not coughing as much. Does not feel like he could leave the hospital today.    Objective     Last Recorded Vitals  BP (!) 137/48 (BP Location: Left arm, Patient Position: Sitting)   Pulse 80   Temp 36 °C (96.8 °F) (Temporal)   Resp 24   Wt 66.2 kg (145 lb 15.1 oz)   SpO2 96%   Intake/Output last 3 Shifts:    Intake/Output Summary (Last 24 hours) at 5/11/2024 1127  Last data filed at 5/10/2024 2200  Gross per 24 hour   Intake 360 ml   Output --   Net 360 ml       Admission Weight  Weight: 64.9 kg (143 lb) (05/04/24 1452)    Daily Weight  05/09/24 : 66.2 kg (145 lb 15.1 oz)    Image Results  Electrocardiogram, 12-lead PRN ACS symptoms  Sinus rhythm with short NM with Premature supraventricular complexes  Junctional ST depression, probably normal  Borderline ECG  When compared with ECG of 05-MAY-2024 18:23,  Premature supraventricular complexes are now Present      Physical Exam  Constitutional:       General: He is not in acute distress.     Appearance: Normal appearance.   HENT:      Head: Normocephalic and atraumatic.      Mouth/Throat:      Mouth: Mucous membranes are moist.   Eyes:      Extraocular Movements: Extraocular movements intact.      Conjunctiva/sclera: Conjunctivae normal.   Pulmonary:      Effort: Pulmonary effort is normal.      Breath sounds: Wheezing (end-expiratory) present. No rhonchi or rales.      Comments: diminished  Musculoskeletal:         General: No swelling or tenderness. Normal range of motion.      Cervical back: Normal range of motion.   Skin:     General: Skin is warm and dry.      Findings: No rash.   Neurological:      General: No focal deficit present.      Mental Status: He is alert. Mental status is at baseline.      Cranial Nerves: No cranial nerve deficit.      Sensory: No sensory deficit.   Psychiatric:          Mood and Affect: Mood normal.         Behavior: Behavior normal.         Relevant Results  Scheduled medications  albuterol, 2.5 mg, nebulization, q8h  benzonatate, 100 mg, oral, Nightly  budesonide, 0.5 mg, nebulization, BID  fluticasone, 2 spray, Each Nostril, BID  ipratropium-albuteroL, 3 mL, nebulization, q8h  lactobacillus acidophilus, 1 capsule, oral, BID  melatonin, 5 mg, oral, Daily  [START ON 5/12/2024] methylPREDNISolone sodium succinate (PF), 40 mg, intravenous, q24h  nebivolol, 2.5 mg, oral, Daily  nicotine, 1 patch, transdermal, Daily  pantoprazole, 40 mg, oral, BID  rivaroxaban, 15 mg, oral, Daily with evening meal  roflumilast, 500 mcg, oral, Daily  tamsulosin, 0.4 mg, oral, Nightly      Continuous medications     PRN medications  PRN medications: acetaminophen, albuterol, ammonium lactate, benzocaine-menthol, benzonatate, bismuth subsalicylate, dextromethorphan-guaifenesin, loperamide, nicotine polacrilex, ondansetron, oxygen, oxygen, sodium chloride    Results for orders placed or performed during the hospital encounter of 05/04/24 (from the past 24 hour(s))   Electrocardiogram, 12-lead PRN ACS symptoms   Result Value Ref Range    Ventricular Rate 75 BPM    Atrial Rate 75 BPM    MO Interval 110 ms    QRS Duration 80 ms    QT Interval 388 ms    QTC Calculation(Bazett) 433 ms    P Axis 75 degrees    R Axis 84 degrees    T Axis 69 degrees    QRS Count 13 beats    Q Onset 226 ms    P Onset 171 ms    P Offset 215 ms    T Offset 420 ms    QTC Fredericia 417 ms   CBC   Result Value Ref Range    WBC 16.9 (H) 4.4 - 11.3 x10*3/uL    nRBC 0.0 0.0 - 0.0 /100 WBCs    RBC 4.21 (L) 4.50 - 5.90 x10*6/uL    Hemoglobin 12.1 (L) 13.5 - 17.5 g/dL    Hematocrit 39.8 (L) 41.0 - 52.0 %    MCV 95 80 - 100 fL    MCH 28.7 26.0 - 34.0 pg    MCHC 30.4 (L) 32.0 - 36.0 g/dL    RDW 13.5 11.5 - 14.5 %    Platelets 204 150 - 450 x10*3/uL   Basic Metabolic Panel   Result Value Ref Range    Glucose 145 (H) 74 - 99 mg/dL     Sodium 143 136 - 145 mmol/L    Potassium 4.2 3.5 - 5.3 mmol/L    Chloride 102 98 - 107 mmol/L    Bicarbonate 35 (H) 21 - 32 mmol/L    Anion Gap 10 10 - 20 mmol/L    Urea Nitrogen 19 6 - 23 mg/dL    Creatinine 0.90 0.50 - 1.30 mg/dL    eGFR 90 >60 mL/min/1.73m*2    Calcium 8.5 (L) 8.6 - 10.3 mg/dL   Magnesium   Result Value Ref Range    Magnesium 2.30 1.60 - 2.40 mg/dL       Electrocardiogram, 12-lead PRN ACS symptoms    Result Date: 5/8/2024  Normal sinus rhythm Normal ECG When compared with ECG of 04-MAY-2024 15:51, No significant change was found Confirmed by Av Servin ([id number]) on 5/8/2024 6:04:26 PM    Transthoracic Echo (TTE) Limited    Result Date: 5/7/2024            [facility] [place] [address] [facility] [address]    Tel [phone] Fax [phone] TRANSTHORACIC ECHOCARDIOGRAM REPORT  Patient Name:     KENNA Pardo Physician:   [id number] Paul Ibrahim MD Study Date:       5/6/2024             Ordering Provider:   [id number] KADIE LAWTON MRN/PID:          [id number]             Fellow: Accession#:       [id number]         Nurse: Date of           1950 / 73 years Sonographer:         Cielo Myers RDCS Birth/Age: Gender:           M                    Additional Staff: Height:           175.26 cm            Admit Date: Weight:           68.49 kg             Admission Status:    Inpatient - Routine BSA / BMI:        1.83 m2 / 22.30      Department Location: [facility]                   kg/m2 Blood Pressure: 132 /57 mmHg Study Type:    TRANSTHORACIC ECHO (TTE) LIMITED Diagnosis/ICD: Supraventricular tachycardia-I47.1 Indication:    Vtach CPT Codes:     Echo Limited-47153  Study Detail: The following Echo studies were performed: 2D, Doppler and color               flow. Technically challenging study due to poor acoustic windows               and prominent  lung artifact.  PHYSICIAN INTERPRETATION: Left Ventricle: Left ventricular systolic function is normal, with an estimated ejection fraction of 55%. Wall motion is abnormal. The left ventricular cavity size is normal. Spectral Doppler shows an impaired relaxation pattern of left ventricular diastolic filling. LV Wall Scoring: The basal and mid inferior septum and mid anteroseptal segment are hypokinetic. All remaining scored segments are normal. Left Atrium: The left atrium is normal in size. Right Ventricle: The right ventricle is normal in size. There is normal right ventricular global systolic function. Right Atrium: The right atrium is normal in size. Aortic Valve: The aortic valve is trileaflet. Aortic valve regurgitation was not assessed. Mitral Valve: The mitral valve is normal in structure. There is trace mitral valve regurgitation. Tricuspid Valve: The tricuspid valve is structurally normal. There is trace tricuspid regurgitation. Pulmonic Valve: The pulmonic valve is not well visualized. The pulmonic valve regurgitation was not assessed. Pericardium: There is a trivial pericardial effusion. Aorta: The aortic root is normal. Systemic Veins: The inferior vena cava appears to be of normal size. There is IVC inspiratory collapse greater than 50%.  CONCLUSIONS:  1. Left ventricular systolic function is normal with a 55% estimated ejection fraction.  2. Basal and mid inferior septum and mid anteroseptal segment are abnormal.  3. Spectral Doppler shows an impaired relaxation pattern of left ventricular diastolic filling. QUANTITATIVE DATA SUMMARY: 2D MEASUREMENTS:                           Normal Ranges: IVSd:          0.79 cm    (0.6-1.1cm) LVPWd:         1.15 cm    (0.6-1.1cm) LVIDd:         4.50 cm    (3.9-5.9cm) LV Mass Index: 119.8 g/m2 LV SYSTOLIC FUNCTION BY 2D PLANIMETRY (MOD):                     Normal Ranges: EF-A4C View: 52.9 % (>=55%) LV DIASTOLIC FUNCTION:                     Normal Ranges: MV Peak  E: 0.83 m/s (0.7-1.2 m/s) MV Peak A: 1.16 m/s (0.42-0.7 m/s) E/A Ratio: 0.71     (1.0-2.2) MITRAL VALVE:                 Normal Ranges: MV DT: 134 msec (150-240msec)  [id number] Paul Ibrahim MD Electronically signed on 5/7/2024 at 8:59:49 AM  Wall Scoring  ** Final **     ECG 12 lead    Result Date: 5/5/2024  Normal sinus rhythm Rightward axis Borderline ECG When compared with ECG of 04-MAY-2024 14:34, (unconfirmed) No significant change was found Confirmed by Sedrick Myles ([id number]) on 5/5/2024 2:20:22 PM    ECG 12 lead    Result Date: 5/5/2024  Sinus rhythm with sinus arrhythmia Borderline Low voltage in frontal leads Otherwise normal ECG When compared with ECG of 20-MAY-2023 23:55, No significant change was found Confirmed by Sedrick Myles ([id number]) on 5/5/2024 2:19:50 PM    CT chest wo IV contrast    Result Date: 5/5/2024  Interpreted By:  Schoenberger, Joseph, STUDY: CT CHEST WO IV CONTRAST;  5/5/2024 8:53 am   INDICATION: Signs/Symptoms:Pneumonia vs viral bronchitis/AECOPD, hx of multiple pulmnary nodules.   COMPARISON: 05/21/2023   ACCESSION NUMBER(S): [id number]   ORDERING CLINICIAN: ROB CORREA   TECHNIQUE: Helical data acquisition of the chest was obtained  without IV contrast material.  Images were reformatted in axial, coronal, and sagittal planes.   FINDINGS: LUNGS AND AIRWAYS: The trachea and central airways are patent. No endobronchial lesion.   In the interval since the prior exam, there are multifocal peribronchovascular ground-glass opacities which are new from the prior exam. There are nonspecific though viral infectious etiology is consideration. There is no pleural effusion or pneumothorax or airspace consolidation. There are moderate 2 severe findings of some upper lung predominant centrilobular emphysema. The bulb lung nodule in the periphery of the anterolateral lower right lower lobe is stable from the prior measuring 12 mm in longitudinal dimension.   MEDIASTINUM AND CAROLYN, LOWER NECK  AND AXILLA: The visualized thyroid gland is within normal limits.   No evidence of thoracic lymphadenopathy by CT criteria.   Esophagus appears within normal limits as seen.   HEART AND VESSELS: The thoracic aorta is normal in course and caliber with mild to moderate atherosclerotic calcifications.   Main pulmonary artery and its branches are normal in caliber.   There are moderate coronary atherosclerotic calcifications. The study is not optimized for evaluation of coronary arteries.   There is no enlargement of cardiac chambers.   There is trace pericardial effusion.   UPPER ABDOMEN: The images of the upper abdomen are unremarkable except for right renal cysts also apparent previously.   CHEST WALL AND OSSEOUS STRUCTURES: There are no suspicious osseous lesions. Multilevel degenerative changes are present       1.  In interval development of of peribronchial vascular ground-glass infiltrates superimposed upon emphysematous changes as detailed above. See discussion above   MACRO: None   Signed by: Joseph Schoenberger 5/5/2024 1:43 PM Dictation workstation:   [id number]    XR chest 1 view    Result Date: 5/4/2024  Interpreted By:  Alli Castellanos, STUDY: XR CHEST 1 VIEW  5/4/2024 2:46 pm   INDICATION: Signs/Symptoms:Chest Pain   COMPARISON: 05/21/2023   ACCESSION NUMBER(S): [id number]   ORDERING CLINICIAN: BESS TIWARI   TECHNIQUE: 2 AP portable radiographs of the chest were obtained   FINDINGS: Multiple cardiac monitoring leads are seen over the chest.   No focal infiltrate, pleural effusion or pneumothorax is identified. The cardiac silhouette is within normal limits for size.       No focal infiltrate or pneumothorax is identified.   MACRO: None.   Signed by: Alli Castellanos 5/4/2024 3:17 PM Dictation workstation:   [id number]       Assessment/Plan   Principal Problem:    COPD exacerbation (Multi)  Active Problems:    Acute on chronic respiratory failure with hypoxia (Multi)    Pneumonia    BPH (benign  prostatic hyperplasia)    Paroxysmal atrial fibrillation (Multi)    Hypercoagulable state due to paroxysmal atrial fibrillation (Multi)    -HFNC at bedtime for help with sleep; not tolerating bipap; flutter valve difficult to use  -patient on 3L statting well; HFNC at bedtime PRN but encouraged to see if he can stay off of it as this will be rate-limiting step to be discharged  -solumedrol reduced to q24h  -stop antibiotics today; completed 7 day course    Code: full  DVT Ppx: Xarelto  GI PPX: not indicated  Diet: regular    Dispo: possible DC tomorrow if feeling well and can get rest without HFNC dependence. Will need wheelchair and oxygen concentrator at DC.    Kevin Israel MD

## 2024-05-11 NOTE — NURSING NOTE
Rounded-patient is wearing high flow oxygen again, questioned him, he said it was there so he was using it.

## 2024-05-12 ENCOUNTER — DOCUMENTATION (OUTPATIENT)
Dept: HOME HEALTH SERVICES | Facility: HOME HEALTH | Age: 74
End: 2024-05-12
Payer: MEDICARE

## 2024-05-12 ENCOUNTER — HOME HEALTH ADMISSION (OUTPATIENT)
Dept: HOME HEALTH SERVICES | Facility: HOME HEALTH | Age: 74
End: 2024-05-12
Payer: MEDICARE

## 2024-05-12 VITALS
RESPIRATION RATE: 24 BRPM | HEIGHT: 69 IN | WEIGHT: 136.47 LBS | DIASTOLIC BLOOD PRESSURE: 59 MMHG | OXYGEN SATURATION: 93 % | BODY MASS INDEX: 20.21 KG/M2 | HEART RATE: 75 BPM | TEMPERATURE: 97 F | SYSTOLIC BLOOD PRESSURE: 138 MMHG

## 2024-05-12 PROBLEM — J96.21 ACUTE ON CHRONIC RESPIRATORY FAILURE WITH HYPOXIA (MULTI): Status: RESOLVED | Noted: 2024-05-10 | Resolved: 2024-05-12

## 2024-05-12 PROBLEM — J44.1 COPD EXACERBATION (MULTI): Status: RESOLVED | Noted: 2024-05-04 | Resolved: 2024-05-12

## 2024-05-12 PROBLEM — J18.9 PNEUMONIA: Status: RESOLVED | Noted: 2024-05-10 | Resolved: 2024-05-12

## 2024-05-12 PROCEDURE — 2500000005 HC RX 250 GENERAL PHARMACY W/O HCPCS: Performed by: INTERNAL MEDICINE

## 2024-05-12 PROCEDURE — 99233 SBSQ HOSP IP/OBS HIGH 50: CPT | Performed by: INTERNAL MEDICINE

## 2024-05-12 PROCEDURE — 94640 AIRWAY INHALATION TREATMENT: CPT

## 2024-05-12 PROCEDURE — 2500000002 HC RX 250 W HCPCS SELF ADMINISTERED DRUGS (ALT 637 FOR MEDICARE OP, ALT 636 FOR OP/ED): Performed by: STUDENT IN AN ORGANIZED HEALTH CARE EDUCATION/TRAINING PROGRAM

## 2024-05-12 PROCEDURE — 94668 MNPJ CHEST WALL SBSQ: CPT

## 2024-05-12 PROCEDURE — 99239 HOSP IP/OBS DSCHRG MGMT >30: CPT | Performed by: INTERNAL MEDICINE

## 2024-05-12 PROCEDURE — S4991 NICOTINE PATCH NONLEGEND: HCPCS | Performed by: STUDENT IN AN ORGANIZED HEALTH CARE EDUCATION/TRAINING PROGRAM

## 2024-05-12 PROCEDURE — 2500000001 HC RX 250 WO HCPCS SELF ADMINISTERED DRUGS (ALT 637 FOR MEDICARE OP)

## 2024-05-12 PROCEDURE — 2500000001 HC RX 250 WO HCPCS SELF ADMINISTERED DRUGS (ALT 637 FOR MEDICARE OP): Performed by: STUDENT IN AN ORGANIZED HEALTH CARE EDUCATION/TRAINING PROGRAM

## 2024-05-12 PROCEDURE — 2500000005 HC RX 250 GENERAL PHARMACY W/O HCPCS: Performed by: STUDENT IN AN ORGANIZED HEALTH CARE EDUCATION/TRAINING PROGRAM

## 2024-05-12 PROCEDURE — 2500000004 HC RX 250 GENERAL PHARMACY W/ HCPCS (ALT 636 FOR OP/ED): Performed by: INTERNAL MEDICINE

## 2024-05-12 RX ORDER — ONDANSETRON HYDROCHLORIDE 8 MG/1
8 TABLET, FILM COATED ORAL EVERY 8 HOURS PRN
Qty: 20 TABLET | Refills: 0 | Status: SHIPPED | OUTPATIENT
Start: 2024-05-12

## 2024-05-12 RX ORDER — AZITHROMYCIN 250 MG/1
250 TABLET, FILM COATED ORAL EVERY OTHER DAY
Qty: 7 TABLET | Refills: 0 | Status: SHIPPED | OUTPATIENT
Start: 2024-05-12 | End: 2024-05-12

## 2024-05-12 RX ORDER — PREDNISONE 10 MG/1
TABLET ORAL DAILY
Qty: 70 TABLET | Refills: 0 | Status: SHIPPED | OUTPATIENT
Start: 2024-05-12 | End: 2024-05-20 | Stop reason: SDUPTHER

## 2024-05-12 RX ORDER — AZITHROMYCIN 250 MG/1
250 TABLET, FILM COATED ORAL EVERY OTHER DAY
Qty: 7 TABLET | Refills: 0 | Status: SHIPPED | OUTPATIENT
Start: 2024-05-12 | End: 2024-06-08 | Stop reason: HOSPADM

## 2024-05-12 RX ORDER — FLUTICASONE PROPIONATE 50 MCG
2 SPRAY, SUSPENSION (ML) NASAL 2 TIMES DAILY
Qty: 16 G | Refills: 12 | Status: SHIPPED | OUTPATIENT
Start: 2024-05-12

## 2024-05-12 RX ADMIN — ALBUTEROL SULFATE 2.5 MG: 2.5 SOLUTION RESPIRATORY (INHALATION) at 04:30

## 2024-05-12 RX ADMIN — Medication 3 L/MIN: at 08:00

## 2024-05-12 RX ADMIN — IPRATROPIUM BROMIDE AND ALBUTEROL SULFATE 3 ML: 2.5; .5 SOLUTION RESPIRATORY (INHALATION) at 08:20

## 2024-05-12 RX ADMIN — METHYLPREDNISOLONE SODIUM SUCCINATE 40 MG: 40 INJECTION, POWDER, FOR SOLUTION INTRAMUSCULAR; INTRAVENOUS at 06:50

## 2024-05-12 RX ADMIN — FLUTICASONE PROPIONATE 2 SPRAY: 50 SPRAY, METERED NASAL at 08:51

## 2024-05-12 RX ADMIN — Medication 1 CAPSULE: at 08:51

## 2024-05-12 RX ADMIN — NICOTINE 1 PATCH: 21 PATCH, EXTENDED RELEASE TRANSDERMAL at 08:51

## 2024-05-12 RX ADMIN — PANTOPRAZOLE SODIUM 40 MG: 40 TABLET, DELAYED RELEASE ORAL at 08:51

## 2024-05-12 RX ADMIN — Medication 3 L/MIN: at 08:22

## 2024-05-12 RX ADMIN — BUDESONIDE 0.5 MG: 0.5 INHALANT RESPIRATORY (INHALATION) at 08:19

## 2024-05-12 RX ADMIN — Medication 3 L/MIN: at 12:07

## 2024-05-12 RX ADMIN — PANTOPRAZOLE SODIUM 40 MG: 40 TABLET, DELAYED RELEASE ORAL at 13:19

## 2024-05-12 RX ADMIN — ALBUTEROL SULFATE 2.5 MG: 2.5 SOLUTION RESPIRATORY (INHALATION) at 12:07

## 2024-05-12 ASSESSMENT — PAIN - FUNCTIONAL ASSESSMENT: PAIN_FUNCTIONAL_ASSESSMENT: 0-10

## 2024-05-12 ASSESSMENT — PAIN SCALES - GENERAL
PAINLEVEL_OUTOF10: 0 - NO PAIN
PAINLEVEL_OUTOF10: 0 - NO PAIN

## 2024-05-12 NOTE — HH CARE COORDINATION
Home Care received a Referral for Physical Therapy and Occupational Therapy. We have processed the referral for a Start of Care on 05/13/2024.     If you have any questions or concerns, please feel free to contact us at [phone]. Follow the prompts, enter your five digit zip code, and you will be directed to your care team on [facility].

## 2024-05-12 NOTE — PROGRESS NOTES
INPATIENT PULMONARY  PROGRESS NOTES    PATIENT NAME: Raheel Cloud    MRN: [id number]  SERVICE DATE:  5/12/2024   SERVICE TIME:  2:16 PM        ASSESSMENT AND PLAN:    Acute on chronic hypoxic hypercapnic respiratory failure   COPD and bronchiectasis exacerbation (Multi), ~stage IV COPD  Hx bronchiectasis + chronic bronchitis on home treatment with vest therapy and bronchodilators.  Atypical vs Viral PNA?  Nicotine use hx  Diarrhea s/p antibiotics  pAfib on xarelto   Arrhythmia ?  GERD     However still requiring high flow oxygen to sleep overnight  Had poor sleep for the last few days and difficult to lay down flat in bed without high flow  Improved sputum production with the chest vest     Continue with the current therapy with bronchodilators      Will send home with prolonged course of tapering steroids  As well will add azithromycin to 50 mg every other day  For the coming 2 weeks then see in the office in 2 weeks  Patient instructed to call as needed     Improving overall and ready for discharge        SUBJECTIVE  Afebrile  No events overnight   Still with moist cough, shortness of breath with exertion, but less than before  Feels some improvement and was able to sleep better overnight    REVIEW OF SYSTEMS    GENERAL: no pain fever, fatigue , chills night sweats or weight loss  Head and neck: no pain or swelling  Chest : as per HPI  Card ; no h/o CAD, CHF , A FIB. KNOWN WITH HTN. NO chest pain, orthopnea or paroxysmal noct dyspnea  Abdomen no abd pain, swelling or change in bowel habits, regular, no constipation or diarrhea  Neuro , no cahne in awakeness, or confusion, no weakness numbness headache or change in vision  Extremities; no leg swelling, pain or change in color   Vascular, NO H/O pvd  Endocrine: h/o DM, HYPOTHYROID           Scheduled medications  albuterol, 2.5 mg, nebulization, q8h  benzonatate, 100 mg, oral, Nightly  budesonide, 0.5 mg, nebulization, BID  fluticasone, 2 spray, Each Nostril,  BID  ipratropium-albuteroL, 3 mL, nebulization, q8h  lactobacillus acidophilus, 1 capsule, oral, BID  melatonin, 5 mg, oral, Daily  methylPREDNISolone sodium succinate (PF), 40 mg, intravenous, q24h  nebivolol, 2.5 mg, oral, Daily  nicotine, 1 patch, transdermal, Daily  pantoprazole, 40 mg, oral, BID  rivaroxaban, 15 mg, oral, Daily with evening meal  roflumilast, 500 mcg, oral, Daily  tamsulosin, 0.4 mg, oral, Nightly      Continuous medications     PRN medications  PRN medications: acetaminophen, albuterol, ammonium lactate, benzocaine-menthol, benzonatate, bismuth subsalicylate, dextromethorphan-guaifenesin, loperamide, nicotine polacrilex, ondansetron, oxygen, oxygen, sodium chloride         OBJECTIVE    Physical Exam  Vitals reviewed.   Constitutional:       Appearance: Normal appearance.  On oxygen  HENT:      Head: Normocephalic and atraumatic.   Eyes:      Extraocular Movements: Extraocular movements intact.      Pupils: Pupils are equal, round, and reactive to light.   Cardiovascular:      Rate and Rhythm: Normal rate and regular rhythm.      Heart sounds: No murmur heard.  Pulmonary:      Effort: No respiratory distress present.      Breath sounds: No stridor. Wheezing and rhonchi present.   Chest:      Chest wall: No tenderness.   Abdominal:      General: Bowel sounds are normal. There is no distension.      Palpations: There is no mass.      Tenderness: There is no abdominal tenderness. There is no rebound.   Musculoskeletal:         General: No swelling present. No tenderness or deformity.      Cervical back: No rigidity.   Lymphadenopathy:      Cervical: No cervical adenopathy.   Skin:     General: Skin is warm.      Coloration: Skin is pale. Skin is not jaundiced.      Findings: No bruising.   Neurological:      General: No focal deficit present.      Mental Status: She is alert and oriented to person, place, and time. Mental status is at baseline.      Cranial Nerves: No cranial nerve deficit.       Motor: Weakness present.   Psychiatric:         Mood and Affect: Mood normal.         Behavior: Behavior normal.     Patient Vitals for the past 24 hrs:   BP Temp Temp src Pulse Resp SpO2 Weight   05/12/24 1207 (!) 136/49 36.1 °C (97 °F) -- 74 26 93 % --   05/12/24 0822 -- -- -- -- -- 93 % --   05/12/24 0800 (!) 116/39 35.8 °C (96.4 °F) Temporal 75 24 92 % --   05/12/24 0424 (!) 101/43 36.4 °C (97.5 °F) Temporal 76 20 95 % 61.9 kg (136 lb 7.4 oz)   05/11/24 2342 (!) 133/47 36.4 °C (97.5 °F) Temporal 98 24 95 % --   05/11/24 1943 131/51 36.6 °C (97.9 °F) Temporal 82 26 95 % --   05/11/24 1625 (!) 146/49 36.1 °C (97 °F) -- 75 24 94 % --             Gen: NAD  Neck: symmetric, no mass  Cardiovascular: RRR  Respiratory: No distress   GI: Abd soft, nontender, non-distended  Extremities: no leg edema  Skin: Warm and dry.  Neuro: alert and oriented times 3    Labs:  Lab Results   Component Value Date    WBC 16.9 (H) 05/11/2024    HGB 12.1 (L) 05/11/2024    HCT 39.8 (L) 05/11/2024    MCV 95 05/11/2024     05/11/2024     Lab Results   Component Value Date    GLUCOSE 145 (H) 05/11/2024    CALCIUM 8.5 (L) 05/11/2024     05/11/2024    K 4.2 05/11/2024    CO2 35 (H) 05/11/2024     05/11/2024    BUN 19 05/11/2024    CREATININE 0.90 05/11/2024   ESR: --  Lab Results   Component Value Date    SEDRATE 11 05/05/2023     Lab Results   Component Value Date    CRP 11.87 (A) 06/10/2020     Lab Results   Component Value Date    ALT 28 05/04/2024    AST 22 05/04/2024    ALKPHOS 48 05/04/2024    BILITOT 0.8 05/04/2024           DATA:   Diagnostic tests reviewed for today's visit:    Labs this admission reviewed  Imagings this admission reviewed  Cultures: Reviewed    Transthoracic Echo (TTE) Limited   Final Result      CT chest wo IV contrast   Final Result   1.  In interval development of of peribronchial vascular ground-glass   infiltrates superimposed upon emphysematous changes as detailed   above. See discussion above         MACRO:   None        Signed by: Joseph Schoenberger 5/5/2024 1:43 PM   Dictation workstation:   [id number]      XR chest 1 view   Final Result   No focal infiltrate or pneumothorax is identified.        MACRO:   None.        Signed by: Alli Castellanos 5/4/2024 3:17 PM   Dictation workstation:   [id number]           Aracelis Velazquez MD

## 2024-05-12 NOTE — CARE PLAN
Pt hds throughout the shift. Alert and oriented. Received breathing treatments as ordered and tolerating 3L NC. Safety maintained, will continue to monitor     Problem: Pain - Adult  Goal: Verbalizes/displays adequate comfort level or baseline comfort level  Outcome: Progressing     Problem: Safety - Adult  Goal: Free from fall injury  Outcome: Progressing     Problem: Discharge Planning  Goal: Discharge to home or other facility with appropriate resources  Outcome: Progressing     Problem: Chronic Conditions and Co-morbidities  Goal: Patient's chronic conditions and co-morbidity symptoms are monitored and maintained or improved  Outcome: Progressing     Problem: Respiratory  Goal: Minimal/no exertional discomfort or dyspnea this shift  Outcome: Progressing  Goal: No signs of respiratory distress (eg. Use of accessory muscles. Peds grunting)  Outcome: Progressing  Goal: Tolerate pulmonary toileting this shift  Outcome: Progressing  Goal: Verbalize decreased shortness of breath this shift  Outcome: Progressing  Goal: Wean oxygen to maintain O2 saturation per order/standard this shift  Outcome: Progressing     Problem: Fall/Injury  Goal: Not fall by end of shift  Outcome: Progressing  Goal: Be free from injury by end of the shift  Outcome: Progressing

## 2024-05-12 NOTE — DISCHARGE SUMMARY
Discharge Diagnosis  COPD exacerbation (Multi)    Issues Requiring Follow-Up      Discharge Meds     Your medication list        START taking these medications        Instructions Last Dose Given Next Dose Due   azithromycin 250 mg tablet  Commonly known as: Zithromax      Take 1 tablet (250 mg) by mouth every other day for 14 days. Take 2 tabs (500 mg) by mouth today, than 1 tab (250 mg) daily for 4 days.       fluticasone 50 mcg/actuation nasal spray  Commonly known as: Flonase      Administer 2 sprays into each nostril 2 times a day. Shake gently. Before first use, prime pump. After use, clean tip and replace cap.       ondansetron 8 mg tablet  Commonly known as: Zofran      Take 1 tablet (8 mg) by mouth every 8 hours if needed for nausea or vomiting.       predniSONE 10 mg tablet  Commonly known as: Deltasone  Start taking on: May 12, 2024      Take 4 tablets (40 mg) by mouth once daily for 7 days, THEN 3 tablets (30 mg) once daily for 7 days, THEN 2 tablets (20 mg) once daily for 7 days, THEN 1 tablet (10 mg) once daily for 7 days.              CHANGE how you take these medications        Instructions Last Dose Given Next Dose Due   Flomax 0.4 mg 24 hr capsule  Generic drug: tamsulosin  What changed: when to take this      Take 1 capsule (0.4 mg) by mouth once daily.              CONTINUE taking these medications        Instructions Last Dose Given Next Dose Due   ammonium lactate 12 % cream  Commonly known as: Amlactin           budesonide 1 mg/2 mL nebulizer solution  Commonly known as: Pulmicort           Combivent Respimat  mcg/actuation inhaler  Generic drug: ipratropium-albuteroL           ipratropium-albuteroL 0.5-2.5 mg/3 mL nebulizer solution  Commonly known as: Duo-Neb           nebivolol 2.5 mg tablet  Commonly known as: Bystolic      Take 1 tablet (2.5 mg) by mouth once daily.       rivaroxaban 15 mg tablet  Commonly known as: Xarelto      Take 1 tablet (15 mg) by mouth once daily in the  evening. Take with meals. Take with food.       roflumilast 250 mcg tablet  Commonly known as: Daliresp      Take 2 tablets (500 mcg) by mouth once daily.       triamcinolone 0.1 % ointment  Commonly known as: Kenalog           Ventolin HFA 90 mcg/actuation inhaler  Generic drug: albuterol      Inhale 2 puffs every 4 hours if needed for wheezing.                 Where to Get Your Medications        These medications were sent to [id number] - [place], [place] - [address] AT [facility] [address]  [address], [facility] [address]      Phone: [phone]   azithromycin 250 mg tablet  fluticasone 50 mcg/actuation nasal spray  ondansetron 8 mg tablet  predniSONE 10 mg tablet         Test Results Pending At Discharge  Pending Labs       No current pending labs.            Hospital Course   History Of Present Illness  Raheel Cloud is a 73 y.o. male presenting with shortness of breath.  Patient reports over the last 4 days he has had worsening dyspnea on exertion and even shortness of breath at rest.  He has been feeling rundown over the last 7 to 10 days as approximately 10 days ago his wife passed away.  He has had family visiting him who were also sick including grandkids with croup.  He states that he has not been sleeping or eating well during this time of grief.  He states that he then started to feel more short of breath and got to the point overnight that he felt like he could not sleep because it was either breath or sleep. He also reports cough and congestion. He also reports a history of COPD with as needed oxygen use.  He reports he primarily uses it with activity at baseline however over the last 4 days he has been using it continuously.     In the ED vital signs were significant for respiratory rate of 37.  Labs were significant for an INR of 1.5, hemoglobin 12.6.  Troponin was 4 x 2.  COVID and flu were negative.  VBG showed a pH of 7.39, pCO2 51, pO2 75.  Blood cultures  were collected.  CXR read showed no focal infiltrate or pneumothorax however imaging suspicious for pneumonia.  Patient was given 2 g of magnesium, 125 mg of IV Solu-Medrol, 3 DuoNeb treatments as well as doxycycline and Rocephin.  Patient was noted to have respiratory distress and was placed on BiPAP with improvement.  He will be admitted for acute on chronic hypoxic respiratory failure secondary to COPD exacerbation with suspected pneumonia.      Hospital course: Patient was admitted to the hospital with COPD exacerbation and pneumonia.  Patient was at 1 point BiPAP dependent during this hospitalization as evidence of acute respiratory failure with hypoxia.  He does have chronic oxygen needs and is on 2 L at home as needed.  Patient is being discharged on 3 L nasal cannula continuous.  He was set up with portable oxygen at discharge.  In addition he was given a wheelchair for discharge.  Patient completed course for pneumonia treatment in the hospital.  He is being discharged on 2 weeks of every other day azithromycin 250 mg once daily for ongoing treatment of inflammation from his COPD.  He is also going on a 4-week steroid taper.  He was given prescription for Flonase and Zofran as needed.  He is being discharged home with home PT/OT home health care.  He will follow-up with his primary care physician and his pulmonologist Dr. Velazquez.    Discharge time greater than 35 minutes. Greater than 50% of time spent on counseling patient, coordination of care, medication reconciliation, transmitting medications to pharmacy and clarifying plan of care with nursing staff and case management.    Pertinent Physical Exam At Time of Discharge  Physical Exam  Constitutional:       General: He is not in acute distress.     Appearance: Normal appearance.   HENT:      Head: Normocephalic and atraumatic.      Mouth/Throat:      Mouth: Mucous membranes are moist.   Eyes:      Extraocular Movements: Extraocular movements intact.       Conjunctiva/sclera: Conjunctivae normal.      Pupils: Pupils are equal, round, and reactive to light.   Cardiovascular:      Rate and Rhythm: Normal rate and regular rhythm.      Heart sounds: Normal heart sounds.   Pulmonary:      Effort: Pulmonary effort is normal.      Breath sounds: Wheezing and rhonchi present. No rales.      Comments: Speaks in short sentences; no inc wob  Abdominal:      General: Abdomen is flat. Bowel sounds are normal.      Palpations: Abdomen is soft.   Musculoskeletal:         General: No swelling or tenderness. Normal range of motion.      Cervical back: Normal range of motion and neck supple.   Skin:     General: Skin is warm and dry.      Findings: No rash.   Neurological:      General: No focal deficit present.      Mental Status: He is alert and oriented to person, place, and time.      Cranial Nerves: No cranial nerve deficit.      Sensory: No sensory deficit.   Psychiatric:         Mood and Affect: Mood normal.         Behavior: Behavior normal.         Outpatient Follow-Up  Future Appointments   Date Time Provider Department Center   6/20/2024  3:30 PM Paul Ibrahim MD [id number] [place]         Kevin Israel MD

## 2024-05-12 NOTE — NURSING NOTE
Heplock removed, vital signs done. Son here with wheelchair from home to take him to car. Instructions and prescriptions and med list reviewed with son. Discharged at 1505.

## 2024-05-12 NOTE — CARE PLAN
Problem: Pain - Adult  Goal: Verbalizes/displays adequate comfort level or baseline comfort level  Outcome: Met     Problem: Safety - Adult  Goal: Free from fall injury  Outcome: Met     Problem: Discharge Planning  Goal: Discharge to home or other facility with appropriate resources  Outcome: Met     Problem: Chronic Conditions and Co-morbidities  Goal: Patient's chronic conditions and co-morbidity symptoms are monitored and maintained or improved  Outcome: Met     Problem: Respiratory  Goal: Minimal/no exertional discomfort or dyspnea this shift  Outcome: Met  Goal: No signs of respiratory distress (eg. Use of accessory muscles. Peds grunting)  Outcome: Met  Goal: Tolerate pulmonary toileting this shift  Outcome: Met  Goal: Verbalize decreased shortness of breath this shift  Outcome: Met  Goal: Wean oxygen to maintain O2 saturation per order/standard this shift  Outcome: Met     Problem: Fall/Injury  Goal: Not fall by end of shift  Outcome: Met  Goal: Be free from injury by end of the shift  Outcome: Met  Goal: Verbalize understanding of personal risk factors for fall in the hospital  Outcome: Met  Goal: Verbalize understanding of risk factor reduction measures to prevent injury from fall in the home  Outcome: Met  Goal: Use assistive devices by end of the shift  Outcome: Met  Goal: Pace activities to prevent fatigue by end of the shift  Outcome: Met   The patient's goals for the shift include to not be transferred to ICU    The clinical goals for the shift include stable pulse ox, possible discharge    Over the shift, the patient did not make progress toward the following goals. Barriers to progression include none. Recommendations to address these barriers include discharge.

## 2024-05-12 NOTE — PROGRESS NOTES
INPATIENT PULMONARY  PROGRESS NOTES    PATIENT NAME: Raheel Cloud    MRN: [id number]  SERVICE DATE:  5/12/2024   SERVICE TIME:  2:16 PM        ASSESSMENT and PLAN:      Acute on chronic hypoxic hypercapnic respiratory failure   COPD and bronchiectasis exacerbation (Multi), ~stage IV COPD  Hx bronchiectasis + chronic bronchitis on home treatment with vest therapy and bronchodilators.  Atypical vs Viral PNA?  Nicotine use hx  Diarrhea s/p antibiotics  pAfib on xarelto   Arrhythmia ?  GERD     Patient and family concerned that patient still not able to sleep well overnight or laying down without tolerated flow oxygen with increased work of breathing that makes him uncomfortable to sleep    Will plan to give a try not to use high flow overnight and alternate with regular nasal cannula which to evaluate whether he will be able to sleep overnight or not before considering discharge   Had poor sleep for the last few days and difficult to lay down flat in bed without high flow  Improved sputum production with the chest vest     Continue with the current therapy with bronchodilators     Meanwhile we will continue with the current steroid doses 40 daily  Continue with  oxygen with nasal cannula as tolerated to keep O2 sat 89 to 92% all the time  the Cough Assist device and EZ Pap to assist with expectoration  Consider discharge in 1 to 2 days    SUBJECTIVE  Afebrile  No events overnight   Still having exertional dyspnea productive cough minimal expectoration  Did not need the high flow overnight  Was able to sleep few hours  Still requiring his oxygen  No fever no chills      REVIEW OF SYSTEMS    GENERAL: no pain fever, fatigue , chills night sweats or weight loss  Head and neck: no pain or swelling  Chest : as per HPI  Card ; no h/o CAD, CHF , A FIB. KNOWN WITH HTN. NO chest pain, orthopnea or paroxysmal noct dyspnea  Abdomen no abd pain, swelling or change in bowel habits, regular, no constipation or diarrhea  Neuro , no  cahne in awakeness, or confusion, no weakness numbness headache or change in vision  Extremities; no leg swelling, pain or change in color   Vascular, NO H/O pvd  Endocrine: h/o DM, HYPOTHYROID           Scheduled medications  albuterol, 2.5 mg, nebulization, q8h  benzonatate, 100 mg, oral, Nightly  budesonide, 0.5 mg, nebulization, BID  fluticasone, 2 spray, Each Nostril, BID  ipratropium-albuteroL, 3 mL, nebulization, q8h  lactobacillus acidophilus, 1 capsule, oral, BID  melatonin, 5 mg, oral, Daily  methylPREDNISolone sodium succinate (PF), 40 mg, intravenous, q24h  nebivolol, 2.5 mg, oral, Daily  nicotine, 1 patch, transdermal, Daily  pantoprazole, 40 mg, oral, BID  rivaroxaban, 15 mg, oral, Daily with evening meal  roflumilast, 500 mcg, oral, Daily  tamsulosin, 0.4 mg, oral, Nightly      Continuous medications     PRN medications  PRN medications: acetaminophen, albuterol, ammonium lactate, benzocaine-menthol, benzonatate, bismuth subsalicylate, dextromethorphan-guaifenesin, loperamide, nicotine polacrilex, ondansetron, oxygen, oxygen, sodium chloride         OBJECTIVE    Physical Exam  Vitals reviewed.   Constitutional:       Appearance: Normal appearance.   HENT:      Head: Normocephalic and atraumatic.   Eyes:      Extraocular Movements: Extraocular movements intact.      Pupils: Pupils are equal, round, and reactive to light.   Cardiovascular:      Rate and Rhythm: Normal rate and regular rhythm.      Heart sounds: No murmur heard.  Pulmonary:      Effort: No respiratory distress present.      Breath sounds: No stridor. Wheezing and rhonchi present.   Chest:      Chest wall: No tenderness.   Abdominal:      General: Bowel sounds are normal. There is no distension.      Palpations: There is no mass.      Tenderness: There is no abdominal tenderness. There is no rebound.   Musculoskeletal:         General: No swelling present. No tenderness or deformity.      Cervical back: No rigidity.   Lymphadenopathy:       Cervical: No cervical adenopathy.   Skin:     General: Skin is warm.      Coloration: Skin is not pale. Skin is not jaundiced.      Findings: No bruising.   Neurological:      General: No focal deficit present.      Mental Status: She is alert and oriented to person, place, and time. Mental status is at baseline.      Cranial Nerves: No cranial nerve deficit.      Motor: No weakness present.   Psychiatric:         Mood and Affect: Mood normal.         Behavior: Behavior normal.     Patient Vitals for the past 24 hrs:   BP Temp Temp src Pulse Resp SpO2 Weight   05/12/24 1207 (!) 136/49 36.1 °C (97 °F) -- 74 26 93 % --   05/12/24 0822 -- -- -- -- -- 93 % --   05/12/24 0800 (!) 116/39 35.8 °C (96.4 °F) Temporal 75 24 92 % --   05/12/24 0424 (!) 101/43 36.4 °C (97.5 °F) Temporal 76 20 95 % 61.9 kg (136 lb 7.4 oz)   05/11/24 2342 (!) 133/47 36.4 °C (97.5 °F) Temporal 98 24 95 % --   05/11/24 1943 131/51 36.6 °C (97.9 °F) Temporal 82 26 95 % --   05/11/24 1625 (!) 146/49 36.1 °C (97 °F) -- 75 24 94 % --             Gen: NAD  Neck: symmetric, no mass  Cardiovascular: RRR  Respiratory: No distress   GI: Abd soft, nontender, non-distended  Extremities: no leg edema  Skin: Warm and dry.  Neuro: alert and oriented times 3    Labs:  Lab Results   Component Value Date    WBC 16.9 (H) 05/11/2024    HGB 12.1 (L) 05/11/2024    HCT 39.8 (L) 05/11/2024    MCV 95 05/11/2024     05/11/2024     Lab Results   Component Value Date    GLUCOSE 145 (H) 05/11/2024    CALCIUM 8.5 (L) 05/11/2024     05/11/2024    K 4.2 05/11/2024    CO2 35 (H) 05/11/2024     05/11/2024    BUN 19 05/11/2024    CREATININE 0.90 05/11/2024   ESR: --  Lab Results   Component Value Date    SEDRATE 11 05/05/2023     Lab Results   Component Value Date    CRP 11.87 (A) 06/10/2020     Lab Results   Component Value Date    ALT 28 05/04/2024    AST 22 05/04/2024    ALKPHOS 48 05/04/2024    BILITOT 0.8 05/04/2024           DATA:   Diagnostic tests  reviewed for today's visit:    Labs this admission reviewed  Imagings this admission reviewed  Cultures: Reviewed    Transthoracic Echo (TTE) Limited   Final Result      CT chest wo IV contrast   Final Result   1.  In interval development of of peribronchial vascular ground-glass   infiltrates superimposed upon emphysematous changes as detailed   above. See discussion above        MACRO:   None        Signed by: Joseph Schoenberger 5/5/2024 1:43 PM   Dictation workstation:   [id number]      XR chest 1 view   Final Result   No focal infiltrate or pneumothorax is identified.        MACRO:   None.        Signed by: Alli Castellanos 5/4/2024 3:17 PM   Dictation workstation:   [id number]           Aracelis Velazquez MD

## 2024-05-12 NOTE — PROGRESS NOTES
05/12/24 1213   Discharge Planning   Who is requesting discharge planning? Provider   Home or Post Acute Services In home services   Type of Home Care Services Home OT;Home PT   Patient expects to be discharged to: home [facility]   Does the patient need discharge transport arranged? No     There is a possible discharge for this patient today. Patient is requesting HHC with PT/OT. Met with patient at bedside to check which HHC agency patient would prefer. States he really has no preference and his PCP is affiliated with  so will have physician put through an [facility]. Also made sure that all DME equipment has been ordered. Faxed order to [facility] for wheelchair and portable O2 for plane use. Patient aware. TCC Team to follow patient for all discharge needs.

## 2024-05-13 ENCOUNTER — PATIENT OUTREACH (OUTPATIENT)
Dept: CARE COORDINATION | Facility: CLINIC | Age: 74
End: 2024-05-13

## 2024-05-13 ENCOUNTER — APPOINTMENT (OUTPATIENT)
Dept: CARDIOLOGY | Facility: CLINIC | Age: 74
End: 2024-05-13
Payer: MEDICARE

## 2024-05-13 ENCOUNTER — HOME CARE VISIT (OUTPATIENT)
Dept: HOME HEALTH SERVICES | Facility: HOME HEALTH | Age: 74
End: 2024-05-13
Payer: MEDICARE

## 2024-05-13 VITALS
TEMPERATURE: 97.8 F | SYSTOLIC BLOOD PRESSURE: 120 MMHG | OXYGEN SATURATION: 94 % | HEART RATE: 84 BPM | DIASTOLIC BLOOD PRESSURE: 46 MMHG

## 2024-05-13 PROCEDURE — 1090000002 HH PPS REVENUE DEBIT

## 2024-05-13 PROCEDURE — 169592 NO-PAY CLAIM PROCEDURE

## 2024-05-13 PROCEDURE — 1090000001 HH PPS REVENUE CREDIT

## 2024-05-13 PROCEDURE — 0023 HH SOC

## 2024-05-13 PROCEDURE — G0151 HHCP-SERV OF PT,EA 15 MIN: HCPCS | Mod: HHH

## 2024-05-13 SDOH — ECONOMIC STABILITY: HOUSING INSECURITY: EVIDENCE OF SMOKING MATERIAL: 1

## 2024-05-13 SDOH — HEALTH STABILITY: MENTAL HEALTH: SMOKING IN HOME: 1

## 2024-05-13 SDOH — ECONOMIC STABILITY: HOUSING INSECURITY: HOME SAFETY: PT SMOKES OUTSIDE OF HOME WITHOUT OXYGEN   PT DOES NOT USE GAS STOVE WITH OXYGEN DONNED

## 2024-05-13 ASSESSMENT — ENCOUNTER SYMPTOMS
DENIES PAIN: 1
SHORTNESS OF BREATH: T
DYSPNEA ON EXERTION: 1
PERSON REPORTING PAIN: PATIENT
FATIGUES EASILY: 1

## 2024-05-13 ASSESSMENT — ACTIVITIES OF DAILY LIVING (ADL)
AMBULATION_DISTANCE/DURATION_TOLERATED: 50 FEET
CURRENT_FUNCTION: ONE PERSON
AMBULATION ASSISTANCE ON FLAT SURFACES: 1
OASIS_M1830: 03
AMBULATION ASSISTANCE: ONE PERSON
ENTERING_EXITING_HOME: ONE PERSON

## 2024-05-13 NOTE — PROGRESS NOTES
Discharge Facility: Replaced by [facility]  Discharge Diagnosis: COPD exacerbation  Admission Date: 5/4/2024  Discharge Date: 5/12/2024    PCP Appointment Date:  -Pt declines scheduling at this time and states he will call to schedule    Hospital Encounter and Summary: Linked     See discharge assessment below for further details    Engagement  Call Start Time: 1040 (5/13/2024 10:42 AM)    Medications  Medications reviewed with patient/caregiver?: Yes (new prescriptions reviewed; azithromycin, fluticasone, ondansetron, prednisone) (5/13/2024 10:42 AM)  Does the patient have all medications ordered at discharge?: Yes (5/13/2024 10:42 AM)  Care Management Interventions: No intervention needed (5/13/2024 10:42 AM)  Is the patient taking all medications as directed (includes completed medication regime)?: Yes (pt states his family member is going to get the prescriptions right now) (5/13/2024 10:42 AM)    Appointments  Does the patient have a primary care provider?: Yes (5/13/2024 10:42 AM)  Has the patient kept scheduled appointments due by today?: Yes (5/13/2024 10:42 AM)    Self Management  What is the home health agency?: [facility] [phone] (5/13/2024 10:42 AM)  Has home health visited the patient within 72 hours of discharge?: Call prior to 72 hours (5/13/2024 10:42 AM)  What Durable Medical Equipment (DME) was ordered?: oxygen therapy for home, wheelchair (5/13/2024 10:42 AM)  Has all Durable Medical Equipment (DME) been delivered?: No (pt states he has oxygen equipment at home but there will be more delivered. Pt currently using 3L continuous.) (5/13/2024 10:42 AM)    Patient Teaching  Does the patient have access to their discharge instructions?: Yes (5/13/2024 10:42 AM)  Care Management Interventions: Reviewed instructions with patient (5/13/2024 10:42 AM)  What is the patient's perception of their health status since discharge?: Improving (5/13/2024 10:42 AM)  Is the patient/caregiver able to teach back the hierarchy of  who to call/visit for symptoms/problems? PCP, Specialist, Home Health nurse, Urgent Care, ED, 911: Yes (5/13/2024 10:42 AM)    Wrap Up  Wrap Up Additional Comments: Pt was admitted to [facility] 5/4-5/12/2024 for COPD exacerbation. Pt reports feeling slowly better since discharge. Pt discharged with prescriptions for azithromycin, flonase, zofran, and prednisone- pt denies questions regarding medication and reports his family member is picking prescriptions up at this time. Pt reports understanding of discharge instructions. DME ordered- oxygen and wheelchair. Pt reports he has oxygen equipment from prior to hospitalization and is using 3L continuous. Pt discharged with [facility] and states a nurse is coming to visit him at 1400. Pt declines scheduling PCP appt at this time and states he will call back to schedule when he knows his availability. Pt denies any questions, needs, or concerns. He is encouraged to call if needs arise. (5/13/2024 10:42 AM)  Call End Time: 1043 (5/13/2024 10:42 AM)

## 2024-05-14 ENCOUNTER — HOME CARE VISIT (OUTPATIENT)
Dept: HOME HEALTH SERVICES | Facility: HOME HEALTH | Age: 74
End: 2024-05-14
Payer: MEDICARE

## 2024-05-14 PROCEDURE — 1090000001 HH PPS REVENUE CREDIT

## 2024-05-14 PROCEDURE — 1090000002 HH PPS REVENUE DEBIT

## 2024-05-14 PROCEDURE — G0152 HHCP-SERV OF OT,EA 15 MIN: HCPCS | Mod: HHH

## 2024-05-14 SDOH — ECONOMIC STABILITY: HOUSING INSECURITY

## 2024-05-14 SDOH — ECONOMIC STABILITY: HOUSING INSECURITY: EVIDENCE OF SMOKING MATERIAL: 1

## 2024-05-14 SDOH — ECONOMIC STABILITY: HOUSING INSECURITY
HOME SAFETY: PT LIVES ALONE BUT CURRENTLY HAS 24HR CARE FROM FAMILY TO ASSIST WITH ADL AND IADL. PT USING HOME O2 AT 3L/MIN, TAKING SATS INDEP. PT STATES HE IS A PHYSICIAN AND KNOWS HE SHOULD NOT BE SMOKING. PT HAS MAIN FLOOR BATHROOM AND UPPER LEVEL BED/BATH WIT

## 2024-05-14 SDOH — HEALTH STABILITY: MENTAL HEALTH: SMOKING IN HOME: 1

## 2024-05-14 ASSESSMENT — ACTIVITIES OF DAILY LIVING (ADL)
TOILETING EQUIPMENT USED: PER PT REPORT
TOILETING: INDEPENDENT
TOILETING: 1

## 2024-05-14 ASSESSMENT — ENCOUNTER SYMPTOMS
DENIES PAIN: 1
PERSON REPORTING PAIN: PATIENT

## 2024-05-15 ENCOUNTER — HOME CARE VISIT (OUTPATIENT)
Dept: HOME HEALTH SERVICES | Facility: HOME HEALTH | Age: 74
End: 2024-05-15
Payer: MEDICARE

## 2024-05-15 PROCEDURE — 1090000002 HH PPS REVENUE DEBIT

## 2024-05-15 PROCEDURE — 1090000001 HH PPS REVENUE CREDIT

## 2024-05-15 PROCEDURE — G0157 HHC PT ASSISTANT EA 15: HCPCS | Mod: CQ,HHH

## 2024-05-16 PROCEDURE — 1090000002 HH PPS REVENUE DEBIT

## 2024-05-16 PROCEDURE — 1090000001 HH PPS REVENUE CREDIT

## 2024-05-16 ASSESSMENT — ENCOUNTER SYMPTOMS
PERSON REPORTING PAIN: PATIENT
DENIES PAIN: 1

## 2024-05-17 PROCEDURE — 1090000002 HH PPS REVENUE DEBIT

## 2024-05-17 PROCEDURE — 1090000001 HH PPS REVENUE CREDIT

## 2024-05-18 PROCEDURE — 1090000002 HH PPS REVENUE DEBIT

## 2024-05-18 PROCEDURE — 1090000001 HH PPS REVENUE CREDIT

## 2024-05-19 PROCEDURE — 1090000001 HH PPS REVENUE CREDIT

## 2024-05-19 PROCEDURE — 1090000002 HH PPS REVENUE DEBIT

## 2024-05-20 ENCOUNTER — OFFICE VISIT (OUTPATIENT)
Dept: PRIMARY CARE | Facility: CLINIC | Age: 74
End: 2024-05-20
Payer: MEDICARE

## 2024-05-20 VITALS
BODY MASS INDEX: 22.07 KG/M2 | TEMPERATURE: 97.3 F | HEIGHT: 69 IN | WEIGHT: 149 LBS | DIASTOLIC BLOOD PRESSURE: 60 MMHG | OXYGEN SATURATION: 97 % | SYSTOLIC BLOOD PRESSURE: 110 MMHG | RESPIRATION RATE: 20 BRPM | HEART RATE: 70 BPM

## 2024-05-20 DIAGNOSIS — J44.9 CHRONIC OBSTRUCTIVE PULMONARY DISEASE, UNSPECIFIED COPD TYPE (MULTI): Primary | ICD-10-CM

## 2024-05-20 DIAGNOSIS — J96.11 CHRONIC RESPIRATORY FAILURE WITH HYPOXIA (MULTI): ICD-10-CM

## 2024-05-20 DIAGNOSIS — I47.29 NSVT (NONSUSTAINED VENTRICULAR TACHYCARDIA) (MULTI): ICD-10-CM

## 2024-05-20 DIAGNOSIS — R60.9 EDEMA, UNSPECIFIED TYPE: Primary | ICD-10-CM

## 2024-05-20 DIAGNOSIS — J44.1 COPD EXACERBATION (MULTI): ICD-10-CM

## 2024-05-20 PROCEDURE — 1123F ACP DISCUSS/DSCN MKR DOCD: CPT | Performed by: INTERNAL MEDICINE

## 2024-05-20 PROCEDURE — 1159F MED LIST DOCD IN RCRD: CPT | Performed by: INTERNAL MEDICINE

## 2024-05-20 PROCEDURE — 1090000001 HH PPS REVENUE CREDIT

## 2024-05-20 PROCEDURE — 1111F DSCHRG MED/CURRENT MED MERGE: CPT | Performed by: INTERNAL MEDICINE

## 2024-05-20 PROCEDURE — 99495 TRANSJ CARE MGMT MOD F2F 14D: CPT | Performed by: INTERNAL MEDICINE

## 2024-05-20 PROCEDURE — G0180 MD CERTIFICATION HHA PATIENT: HCPCS | Performed by: INTERNAL MEDICINE

## 2024-05-20 PROCEDURE — 1158F ADVNC CARE PLAN TLK DOCD: CPT | Performed by: INTERNAL MEDICINE

## 2024-05-20 PROCEDURE — 1090000002 HH PPS REVENUE DEBIT

## 2024-05-20 RX ORDER — MONTELUKAST SODIUM 10 MG/1
10 TABLET ORAL NIGHTLY
Qty: 90 TABLET | Refills: 1 | Status: SHIPPED | OUTPATIENT
Start: 2024-05-20 | End: 2024-11-16

## 2024-05-20 RX ORDER — ROFLUMILAST 500 UG/1
500 TABLET ORAL DAILY
COMMUNITY
Start: 2024-04-29

## 2024-05-20 RX ORDER — PREDNISONE 10 MG/1
TABLET ORAL DAILY
Qty: 70 TABLET | Refills: 0 | Status: SHIPPED | OUTPATIENT
Start: 2024-05-20 | End: 2024-06-16

## 2024-05-20 ASSESSMENT — ENCOUNTER SYMPTOMS
PALPITATIONS: 0
DIARRHEA: 1
SHORTNESS OF BREATH: 1
CONSTIPATION: 0
ABDOMINAL PAIN: 0

## 2024-05-20 NOTE — PROGRESS NOTES
"Subjective   Patient ID: Raheel Cloud is a 73 y.o. male who presents for Hospital Follow-up (COPD / Pneumonia ).    Admitted on 5/4  Discharged on 5/12  Transition of care phone call made.    Here today on 5/20 - within 2 weeks of discharge.      He continues to be SOB and dyspneic with minimal exertion; however, admits he feels much better then when he first went to the hospital.    Some ankle edema in both legs.    We reviewed his hospital records and current medications.      Review of Systems   Respiratory:  Positive for shortness of breath.    Cardiovascular:  Positive for leg swelling. Negative for chest pain and palpitations.   Gastrointestinal:  Positive for diarrhea. Negative for abdominal pain and constipation.        D/t abt       Objective   /60 (BP Location: Left arm, Patient Position: Sitting, BP Cuff Size: Adult)   Pulse 70   Temp 36.3 °C (97.3 °F) (Tympanic)   Resp 20   Ht 1.753 m (5' 9\")   Wt 67.6 kg (149 lb)   SpO2 97%   BMI 22.00 kg/m²     Physical Exam  Vitals reviewed.   Constitutional:       Appearance: Normal appearance.   HENT:      Head: Normocephalic.   Cardiovascular:      Rate and Rhythm: Normal rate and regular rhythm.   Pulmonary:      Effort: Pulmonary effort is normal.      Breath sounds: Normal breath sounds.      Comments: Decreased BS throughout.  Musculoskeletal:         General: Normal range of motion.   Neurological:      General: No focal deficit present.      Mental Status: He is alert.   Psychiatric:         Mood and Affect: Mood normal.         Assessment/Plan   Problem List Items Addressed This Visit    None  Visit Diagnoses         Codes    Edema, unspecified type    -  Primary R60.9    COPD exacerbation (Multi)     J44.1    Relevant Medications    predniSONE (Deltasone) 10 mg tablet    montelukast (Singulair) 10 mg tablet    NSVT (nonsustained ventricular tachycardia) (Multi)     I47.29    Chronic respiratory failure with hypoxia (Multi)     J96.11      " "  Continued respiratory failure.  He does not feel the prednisone has fully kicked in.  He is taking Daliresp as well.  We will add singulair and continue higher dose (40 mg) prednisone a little longer.    Edema either from prednisone as he states has happened like this previously.  No signs of right sided failure on echo.    NSVT at hospital.  Good LV function.  On bystolic.  Continue medications.    Discussed \"exercise\" as able.    Follow up in 2 months - sooner if any issues.       "

## 2024-05-21 PROCEDURE — 1090000002 HH PPS REVENUE DEBIT

## 2024-05-21 PROCEDURE — 1090000001 HH PPS REVENUE CREDIT

## 2024-05-22 PROCEDURE — 1090000001 HH PPS REVENUE CREDIT

## 2024-05-22 PROCEDURE — 1090000002 HH PPS REVENUE DEBIT

## 2024-05-23 PROCEDURE — 1090000002 HH PPS REVENUE DEBIT

## 2024-05-23 PROCEDURE — 1090000001 HH PPS REVENUE CREDIT

## 2024-05-24 ENCOUNTER — APPOINTMENT (OUTPATIENT)
Dept: RADIOLOGY | Facility: HOSPITAL | Age: 74
End: 2024-05-24
Payer: MEDICARE

## 2024-05-24 ENCOUNTER — APPOINTMENT (OUTPATIENT)
Dept: CARDIOLOGY | Facility: HOSPITAL | Age: 74
End: 2024-05-24
Payer: MEDICARE

## 2024-05-24 ENCOUNTER — HOME CARE VISIT (OUTPATIENT)
Dept: HOME HEALTH SERVICES | Facility: HOME HEALTH | Age: 74
End: 2024-05-24
Payer: MEDICARE

## 2024-05-24 ENCOUNTER — HOSPITAL ENCOUNTER (EMERGENCY)
Facility: HOSPITAL | Age: 74
Discharge: HOME | End: 2024-05-25
Attending: EMERGENCY MEDICINE
Payer: MEDICARE

## 2024-05-24 ENCOUNTER — HOSPITAL ENCOUNTER (OUTPATIENT)
Dept: CARDIOLOGY | Facility: HOSPITAL | Age: 74
Discharge: HOME | End: 2024-05-24
Payer: MEDICARE

## 2024-05-24 VITALS
SYSTOLIC BLOOD PRESSURE: 90 MMHG | OXYGEN SATURATION: 98 % | TEMPERATURE: 97.4 F | DIASTOLIC BLOOD PRESSURE: 50 MMHG | HEART RATE: 77 BPM

## 2024-05-24 DIAGNOSIS — R07.89 CHEST PRESSURE: ICD-10-CM

## 2024-05-24 DIAGNOSIS — I48.91 ATRIAL FIBRILLATION WITH RVR (MULTI): Primary | ICD-10-CM

## 2024-05-24 LAB
ALBUMIN SERPL BCP-MCNC: 4 G/DL (ref 3.4–5)
ALP SERPL-CCNC: 52 U/L (ref 33–136)
ALT SERPL W P-5'-P-CCNC: 24 U/L (ref 10–52)
ANION GAP SERPL CALC-SCNC: 12 MMOL/L (ref 10–20)
AST SERPL W P-5'-P-CCNC: 12 U/L (ref 9–39)
BASOPHILS # BLD AUTO: 0.02 X10*3/UL (ref 0–0.1)
BASOPHILS NFR BLD AUTO: 0.2 %
BILIRUB SERPL-MCNC: 0.5 MG/DL (ref 0–1.2)
BNP SERPL-MCNC: 104 PG/ML (ref 0–99)
BUN SERPL-MCNC: 20 MG/DL (ref 6–23)
CALCIUM SERPL-MCNC: 8.8 MG/DL (ref 8.6–10.3)
CARDIAC TROPONIN I PNL SERPL HS: 7 NG/L (ref 0–20)
CHLORIDE SERPL-SCNC: 103 MMOL/L (ref 98–107)
CO2 SERPL-SCNC: 32 MMOL/L (ref 21–32)
CREAT SERPL-MCNC: 0.95 MG/DL (ref 0.5–1.3)
EGFRCR SERPLBLD CKD-EPI 2021: 85 ML/MIN/1.73M*2
EOSINOPHIL # BLD AUTO: 0.06 X10*3/UL (ref 0–0.4)
EOSINOPHIL NFR BLD AUTO: 0.5 %
ERYTHROCYTE [DISTWIDTH] IN BLOOD BY AUTOMATED COUNT: 14.8 % (ref 11.5–14.5)
GLUCOSE SERPL-MCNC: 132 MG/DL (ref 74–99)
HCT VFR BLD AUTO: 40.5 % (ref 41–52)
HGB BLD-MCNC: 12.6 G/DL (ref 13.5–17.5)
IMM GRANULOCYTES # BLD AUTO: 0.11 X10*3/UL (ref 0–0.5)
IMM GRANULOCYTES NFR BLD AUTO: 0.9 % (ref 0–0.9)
INR PPP: 1 (ref 0.9–1.1)
LYMPHOCYTES # BLD AUTO: 1.09 X10*3/UL (ref 0.8–3)
LYMPHOCYTES NFR BLD AUTO: 8.6 %
MAGNESIUM SERPL-MCNC: 2.19 MG/DL (ref 1.6–2.4)
MCH RBC QN AUTO: 29.3 PG (ref 26–34)
MCHC RBC AUTO-ENTMCNC: 31.1 G/DL (ref 32–36)
MCV RBC AUTO: 94 FL (ref 80–100)
MONOCYTES # BLD AUTO: 0.73 X10*3/UL (ref 0.05–0.8)
MONOCYTES NFR BLD AUTO: 5.7 %
NEUTROPHILS # BLD AUTO: 10.72 X10*3/UL (ref 1.6–5.5)
NEUTROPHILS NFR BLD AUTO: 84.1 %
NRBC BLD-RTO: 0 /100 WBCS (ref 0–0)
PLATELET # BLD AUTO: 200 X10*3/UL (ref 150–450)
POTASSIUM SERPL-SCNC: 3.8 MMOL/L (ref 3.5–5.3)
PROT SERPL-MCNC: 6.1 G/DL (ref 6.4–8.2)
PROTHROMBIN TIME: 11.3 SECONDS (ref 9.8–12.8)
RBC # BLD AUTO: 4.3 X10*6/UL (ref 4.5–5.9)
SODIUM SERPL-SCNC: 143 MMOL/L (ref 136–145)
WBC # BLD AUTO: 12.7 X10*3/UL (ref 4.4–11.3)

## 2024-05-24 PROCEDURE — 2500000004 HC RX 250 GENERAL PHARMACY W/ HCPCS (ALT 636 FOR OP/ED)

## 2024-05-24 PROCEDURE — 93005 ELECTROCARDIOGRAM TRACING: CPT

## 2024-05-24 PROCEDURE — 71045 X-RAY EXAM CHEST 1 VIEW: CPT | Performed by: STUDENT IN AN ORGANIZED HEALTH CARE EDUCATION/TRAINING PROGRAM

## 2024-05-24 PROCEDURE — 2500000005 HC RX 250 GENERAL PHARMACY W/O HCPCS: Performed by: EMERGENCY MEDICINE

## 2024-05-24 PROCEDURE — 99285 EMERGENCY DEPT VISIT HI MDM: CPT | Performed by: EMERGENCY MEDICINE

## 2024-05-24 PROCEDURE — G0151 HHCP-SERV OF PT,EA 15 MIN: HCPCS | Mod: HHH

## 2024-05-24 PROCEDURE — 2500000005 HC RX 250 GENERAL PHARMACY W/O HCPCS

## 2024-05-24 PROCEDURE — 85610 PROTHROMBIN TIME: CPT | Performed by: EMERGENCY MEDICINE

## 2024-05-24 PROCEDURE — 84484 ASSAY OF TROPONIN QUANT: CPT | Performed by: EMERGENCY MEDICINE

## 2024-05-24 PROCEDURE — 1090000001 HH PPS REVENUE CREDIT

## 2024-05-24 PROCEDURE — 80053 COMPREHEN METABOLIC PANEL: CPT | Performed by: EMERGENCY MEDICINE

## 2024-05-24 PROCEDURE — 71045 X-RAY EXAM CHEST 1 VIEW: CPT

## 2024-05-24 PROCEDURE — 83880 ASSAY OF NATRIURETIC PEPTIDE: CPT | Performed by: EMERGENCY MEDICINE

## 2024-05-24 PROCEDURE — 36415 COLL VENOUS BLD VENIPUNCTURE: CPT | Performed by: EMERGENCY MEDICINE

## 2024-05-24 PROCEDURE — 83735 ASSAY OF MAGNESIUM: CPT | Performed by: EMERGENCY MEDICINE

## 2024-05-24 PROCEDURE — 99284 EMERGENCY DEPT VISIT MOD MDM: CPT

## 2024-05-24 PROCEDURE — 85025 COMPLETE CBC W/AUTO DIFF WBC: CPT | Performed by: EMERGENCY MEDICINE

## 2024-05-24 PROCEDURE — 1090000002 HH PPS REVENUE DEBIT

## 2024-05-24 RX ORDER — POTASSIUM CHLORIDE 20 MEQ/1
40 TABLET, EXTENDED RELEASE ORAL ONCE
Status: DISCONTINUED | OUTPATIENT
Start: 2024-05-24 | End: 2024-05-25 | Stop reason: HOSPADM

## 2024-05-24 RX ORDER — DILTIAZEM HCL/D5W 125 MG/125
5-15 PLASTIC BAG, INJECTION (ML) INTRAVENOUS CONTINUOUS
Status: DISCONTINUED | OUTPATIENT
Start: 2024-05-24 | End: 2024-05-25 | Stop reason: HOSPADM

## 2024-05-24 RX ORDER — DILTIAZEM HYDROCHLORIDE 5 MG/ML
5 INJECTION INTRAVENOUS ONCE
Status: COMPLETED | OUTPATIENT
Start: 2024-05-24 | End: 2024-05-24

## 2024-05-24 RX ORDER — DILTIAZEM HCL/D5W 125 MG/125
PLASTIC BAG, INJECTION (ML) INTRAVENOUS
Status: COMPLETED
Start: 2024-05-24 | End: 2024-05-24

## 2024-05-24 RX ADMIN — Medication 10 MG/HR: at 22:44

## 2024-05-24 RX ADMIN — Medication 4 L/MIN: at 22:35

## 2024-05-24 RX ADMIN — DILTIAZEM HYDROCHLORIDE 5 MG: 5 INJECTION INTRAVENOUS at 22:38

## 2024-05-24 ASSESSMENT — COLUMBIA-SUICIDE SEVERITY RATING SCALE - C-SSRS
2. HAVE YOU ACTUALLY HAD ANY THOUGHTS OF KILLING YOURSELF?: NO
6. HAVE YOU EVER DONE ANYTHING, STARTED TO DO ANYTHING, OR PREPARED TO DO ANYTHING TO END YOUR LIFE?: NO
1. IN THE PAST MONTH, HAVE YOU WISHED YOU WERE DEAD OR WISHED YOU COULD GO TO SLEEP AND NOT WAKE UP?: NO

## 2024-05-24 ASSESSMENT — ACTIVITIES OF DAILY LIVING (ADL)
OASIS_M1830: 00
AMBULATION ASSISTANCE: INDEPENDENT
CURRENT_FUNCTION: INDEPENDENT
HOME_HEALTH_OASIS: 00
PHYSICAL TRANSFERS ASSESSED: 1
AMBULATION ASSISTANCE: 1

## 2024-05-24 ASSESSMENT — ENCOUNTER SYMPTOMS
PERSON REPORTING PAIN: PATIENT
DENIES PAIN: 1

## 2024-05-24 ASSESSMENT — PAIN SCALES - GENERAL: PAINLEVEL_OUTOF10: 0 - NO PAIN

## 2024-05-24 ASSESSMENT — PAIN - FUNCTIONAL ASSESSMENT: PAIN_FUNCTIONAL_ASSESSMENT: 0-10

## 2024-05-25 VITALS
RESPIRATION RATE: 20 BRPM | OXYGEN SATURATION: 98 % | HEART RATE: 80 BPM | WEIGHT: 149 LBS | BODY MASS INDEX: 21.33 KG/M2 | SYSTOLIC BLOOD PRESSURE: 118 MMHG | DIASTOLIC BLOOD PRESSURE: 52 MMHG | TEMPERATURE: 97.3 F | HEIGHT: 70 IN

## 2024-05-25 LAB — CARDIAC TROPONIN I PNL SERPL HS: 9 NG/L (ref 0–20)

## 2024-05-25 PROCEDURE — 2500000002 HC RX 250 W HCPCS SELF ADMINISTERED DRUGS (ALT 637 FOR MEDICARE OP, ALT 636 FOR OP/ED)

## 2024-05-25 PROCEDURE — 84484 ASSAY OF TROPONIN QUANT: CPT | Performed by: EMERGENCY MEDICINE

## 2024-05-25 PROCEDURE — 36415 COLL VENOUS BLD VENIPUNCTURE: CPT | Performed by: EMERGENCY MEDICINE

## 2024-05-25 PROCEDURE — 94640 AIRWAY INHALATION TREATMENT: CPT

## 2024-05-25 RX ORDER — IPRATROPIUM BROMIDE AND ALBUTEROL SULFATE 2.5; .5 MG/3ML; MG/3ML
3 SOLUTION RESPIRATORY (INHALATION) ONCE
Status: COMPLETED | OUTPATIENT
Start: 2024-05-25 | End: 2024-05-25

## 2024-05-25 RX ADMIN — IPRATROPIUM BROMIDE AND ALBUTEROL SULFATE 3 ML: 2.5; .5 SOLUTION RESPIRATORY (INHALATION) at 00:19

## 2024-05-25 ASSESSMENT — PAIN - FUNCTIONAL ASSESSMENT: PAIN_FUNCTIONAL_ASSESSMENT: 0-10

## 2024-05-25 ASSESSMENT — LIFESTYLE VARIABLES
TOTAL SCORE: 0
HAVE PEOPLE ANNOYED YOU BY CRITICIZING YOUR DRINKING: NO
EVER HAD A DRINK FIRST THING IN THE MORNING TO STEADY YOUR NERVES TO GET RID OF A HANGOVER: NO
EVER FELT BAD OR GUILTY ABOUT YOUR DRINKING: NO
HAVE YOU EVER FELT YOU SHOULD CUT DOWN ON YOUR DRINKING: NO

## 2024-05-25 NOTE — DISCHARGE INSTRUCTIONS
Please follow-up with Dr. Ibrahim soon as you are able to.    If you have a return or worsening of symptoms including worsening chest pain, shortness of breath, lightheadedness, dizziness, sweating, chest pain at rest, worsening exercise tolerance, or any new or concerning symptoms please seek immediate medical attention or come back to the ER.

## 2024-05-25 NOTE — ED PROVIDER NOTES
EMERGENCY DEPARTMENT ENCOUNTER      Pt Name: Raheel Cloud  MRN: [id number]  Birthdate 1950  Date of evaluation: 5/24/2024  Provider: Hunter Troy DO    CHIEF COMPLAINT       Chief Complaint   Patient presents with    Rapid Heart Rate     Pt presents with AfiB RVR and increased SOB. Pt.attempted to con         HISTORY OF PRESENT ILLNESS    Patient is a 73-year-old male presenting today with a chief complaint of chest tightness, throat tightness, concern for hypoxia.  Patient is history of COPD is on oxygen therapy at baseline.  Patient was feeling this sensation tonight in his throat when he realized that he was not on his oxygen for a few hours.  Patient continues to have the same sensation on arrival.  No abdominal pain or nausea.  No fevers chills.  No other cough or concern for other infection at this time.  No dysuria hematuria.  Patient on 2 to 3 L at baseline of oxygen.  He is on Xarelto for his history of A-fib.          Nursing Notes were reviewed.    PAST MEDICAL HISTORY     Past Medical History:   Diagnosis Date    Personal history of other diseases of the respiratory system 06/23/2021    History of chronic obstructive lung disease         SURGICAL HISTORY       Past Surgical History:   Procedure Laterality Date    OTHER SURGICAL HISTORY  09/24/2020    Umbilical hernia repair laparoscopic    OTHER SURGICAL HISTORY  09/24/2020    Peritoneal adhesiolysis         CURRENT MEDICATIONS       Discharge Medication List as of 5/25/2024  1:37 AM        CONTINUE these medications which have NOT CHANGED    Details   ammonium lactate (Amlactin) 12 % cream Apply 1 Application topically 2 times a day as needed for dry skin., Historical Med      azithromycin (Zithromax) 250 mg tablet Take 1 tablet (250 mg) by mouth every other day for 14 days., Starting Sun 5/12/2024, Until Sun 5/26/2024, Normal      budesonide (Pulmicort) 1 mg/2 mL nebulizer solution Take 2 mL (1 mg) by nebulization 2 times a day. Rinse mouth with  water after use to reduce aftertaste and incidence of candidiasis. Do not swallow., Historical Med      Flomax 0.4 mg 24 hr capsule Take 1 capsule (0.4 mg) by mouth once daily., Starting Tue 1/30/2024, Until Wed 1/29/2025, Normal      fluticasone (Flonase) 50 mcg/actuation nasal spray Administer 2 sprays into each nostril 2 times a day. Shake gently. Before first use, prime pump. After use, clean tip and replace cap., Starting Sun 5/12/2024, Normal      ipratropium-albuteroL (Combivent Respimat)  mcg/actuation inhaler Inhale 1 puff every 4 hours if needed for wheezing., Historical Med      ipratropium-albuteroL (Duo-Neb) 0.5-2.5 mg/3 mL nebulizer solution Inhale 3 mL 4 times a day., Starting Fri 5/5/2023, Historical Med      montelukast (Singulair) 10 mg tablet Take 1 tablet (10 mg) by mouth once daily at bedtime., Starting Mon 5/20/2024, Until Sat 11/16/2024, Normal      nebivolol (Bystolic) 2.5 mg tablet Take 1 tablet (2.5 mg) by mouth once daily., Starting Tue 1/30/2024, Until Wed 1/29/2025, Normal      ondansetron (Zofran) 8 mg tablet Take 1 tablet (8 mg) by mouth every 8 hours if needed for nausea or vomiting., Starting Sun 5/12/2024, Normal      oxygen (O2) gas therapy Inhale 3 L/min continuously. Indications: hypoxia, Historical Med - Home Care      predniSONE (Deltasone) 10 mg tablet Multiple Dosages:Starting Mon 5/20/2024, Until Sun 5/26/2024, THEN Starting Mon 5/27/2024, Until Sun 6/2/2024, THEN Starting Mon 6/3/2024, Until Sun 6/9/2024, THEN Starting Mon 6/10/2024, Until Sun 6/16/2024Take 4 tablets (40 mg) by mouth once daily  for 7 days, THEN 3 tablets (30 mg) once daily for 7 days, THEN 2 tablets (20 mg) once daily for 7 days, THEN 1 tablet (10 mg) once daily for 7 days., Normal      rivaroxaban (Xarelto) 15 mg tablet Take 1 tablet (15 mg) by mouth once daily in the evening. Take with meals. Take with food., Starting Tue 10/10/2023, Until Wed 10/9/2024, Normal      roflumilast (Daliresp) 500 mcg  tablet Take 1 tablet (500 mcg) by mouth once daily., Starting Mon 4/29/2024, Historical Med      triamcinolone (Kenalog) 0.1 % ointment Apply 1 Application topically 2 times a day as needed., Historical Med      Ventolin HFA 90 mcg/actuation inhaler Inhale 2 puffs every 4 hours if needed for wheezing., Starting Thu 1/18/2024, Until Fri 1/17/2025 at 2359, Normal             ALLERGIES     Patient has no known allergies.    FAMILY HISTORY     No family history on file.       SOCIAL HISTORY       Social History     Socioeconomic History    Marital status:      Spouse name: None    Number of children: None    Years of education: None    Highest education level: None   Occupational History    None   Tobacco Use    Smoking status: Every Day     Current packs/day: 2.00     Types: Cigarettes    Smokeless tobacco: Never   Vaping Use    Vaping status: Never Used   Substance and Sexual Activity    Alcohol use: Not Currently    Drug use: Not Currently    Sexual activity: Not Currently   Other Topics Concern    None   Social History Narrative    None     Social Determinants of Health     Financial Resource Strain: Low Risk  (5/4/2024)    Overall Financial Resource Strain (CARDIA)     Difficulty of Paying Living Expenses: Not hard at all   Food Insecurity: Not on file   Transportation Needs: No Transportation Needs (5/24/2024)    OASIS : Transportation     Lack of Transportation (Medical): No     Lack of Transportation (Non-Medical): No     Patient Unable or Declines to Respond: No   Physical Activity: Not on file   Stress: Not on file   Social Connections: Feeling Socially Integrated (5/24/2024)    OASIS : Social Isolation     Frequency of experiencing loneliness or isolation: Never   Intimate Partner Violence: Not on file   Housing Stability: Low Risk  (5/4/2024)    Housing Stability Vital Sign     Unable to Pay for Housing in the Last Year: No     Number of Places Lived in the Last Year: 1     Unstable Housing  in the Last Year: No       SCREENINGS                        PHYSICAL EXAM    (up to 7 for level 4, 8 or more for level 5)     ED Triage Vitals [05/24/24 2219]   Temperature Heart Rate Respirations BP   36.3 °C (97.3 °F) (!) 119 (!) 22 134/64      Pulse Ox Temp Source Heart Rate Source Patient Position   94 % Temporal Monitor Sitting      BP Location FiO2 (%)     Left arm --       Physical Exam  Vitals and nursing note reviewed.   Constitutional:       General: He is not in acute distress.     Appearance: Normal appearance. He is not ill-appearing or toxic-appearing.   HENT:      Head: Normocephalic and atraumatic.      Right Ear: External ear normal.      Left Ear: External ear normal.      Nose: Nose normal.   Eyes:      General:         Right eye: No discharge.         Left eye: No discharge.      Extraocular Movements: Extraocular movements intact.      Conjunctiva/sclera: Conjunctivae normal.      Pupils: Pupils are equal, round, and reactive to light.   Cardiovascular:      Rate and Rhythm: Tachycardia present. Rhythm irregular.      Pulses: Normal pulses.      Heart sounds: Normal heart sounds.   Pulmonary:      Effort: Pulmonary effort is normal. Tachypnea present. No accessory muscle usage or prolonged expiration.      Breath sounds: Decreased breath sounds present.   Abdominal:      General: There is no distension.      Palpations: Abdomen is soft. There is no mass.      Tenderness: There is no abdominal tenderness. There is no guarding.   Musculoskeletal:         General: No deformity or signs of injury.   Skin:     General: Skin is warm and dry.   Neurological:      General: No focal deficit present.      Mental Status: He is alert and oriented to person, place, and time. Mental status is at baseline.   Psychiatric:         Mood and Affect: Mood normal.         Behavior: Behavior normal.          DIAGNOSTIC RESULTS     LABS:  Labs Reviewed   CBC WITH AUTO DIFFERENTIAL - Abnormal       Result Value    WBC  12.7 (*)     nRBC 0.0      RBC 4.30 (*)     Hemoglobin 12.6 (*)     Hematocrit 40.5 (*)     MCV 94      MCH 29.3      MCHC 31.1 (*)     RDW 14.8 (*)     Platelets 200      Neutrophils % 84.1      Immature Granulocytes %, Automated 0.9      Lymphocytes % 8.6      Monocytes % 5.7      Eosinophils % 0.5      Basophils % 0.2      Neutrophils Absolute 10.72 (*)     Immature Granulocytes Absolute, Automated 0.11      Lymphocytes Absolute 1.09      Monocytes Absolute 0.73      Eosinophils Absolute 0.06      Basophils Absolute 0.02     COMPREHENSIVE METABOLIC PANEL - Abnormal    Glucose 132 (*)     Sodium 143      Potassium 3.8      Chloride 103      Bicarbonate 32      Anion Gap 12      Urea Nitrogen 20      Creatinine 0.95      eGFR 85      Calcium 8.8      Albumin 4.0      Alkaline Phosphatase 52      Total Protein 6.1 (*)     AST 12      Bilirubin, Total 0.5      ALT 24     B-TYPE NATRIURETIC PEPTIDE - Abnormal     (*)     Narrative:        <100 pg/mL - Heart failure unlikely  100-299 pg/mL - Intermediate probability of acute heart                  failure exacerbation. Correlate with clinical                  context and patient history.    >=300 pg/mL - Heart Failure likely. Correlate with clinical                  context and patient history.    BNP testing is performed using different testing methodology at [facility] than at other [facility]. Direct result comparisons should only be made within the same method.      PROTIME-INR - Normal    Protime 11.3      INR 1.0     SERIAL TROPONIN-INITIAL - Normal    Troponin I, High Sensitivity 7      Narrative:     Less than 99th percentile of normal range cutoff-  Female and children under 18 years old <14 ng/L; Male <21 ng/L: Negative  Repeat testing should be performed if clinically indicated.     Female and children under 18 years old 14-50 ng/L; Male 21-50 ng/L:  Consistent with possible cardiac damage and possible increased clinical   risk.  Serial measurements may help to assess extent of myocardial damage.     >50 ng/L: Consistent with cardiac damage, increased clinical risk and  myocardial infarction. Serial measurements may help assess extent of   myocardial damage.      NOTE: Children less than 1 year old may have higher baseline troponin   levels and results should be interpreted in conjunction with the overall   clinical context.     NOTE: Troponin I testing is performed using a different   testing methodology at [facility] than at other   [facility]. Direct result comparisons should only   be made within the same method.   MAGNESIUM - Normal    Magnesium 2.19     SERIAL TROPONIN, 1 HOUR - Normal    Troponin I, High Sensitivity 9      Narrative:     Less than 99th percentile of normal range cutoff-  Female and children under 18 years old <14 ng/L; Male <21 ng/L: Negative  Repeat testing should be performed if clinically indicated.     Female and children under 18 years old 14-50 ng/L; Male 21-50 ng/L:  Consistent with possible cardiac damage and possible increased clinical   risk. Serial measurements may help to assess extent of myocardial damage.     >50 ng/L: Consistent with cardiac damage, increased clinical risk and  myocardial infarction. Serial measurements may help assess extent of   myocardial damage.      NOTE: Children less than 1 year old may have higher baseline troponin   levels and results should be interpreted in conjunction with the overall   clinical context.     NOTE: Troponin I testing is performed using a different   testing methodology at [facility] than at other   [facility]. Direct result comparisons should only   be made within the same method.   TROPONIN SERIES- (INITIAL, 1 HR)    Narrative:     The following orders were created for panel order Troponin I Series, High Sensitivity (0, 1 HR).  Procedure                               Abnormality         Status                      ---------                               -----------         ------                     Troponin I, High Sensiti...[[id number]]  Normal              Final result               Troponin, High Sensitivi...[[id number]]  Normal              Final result                 Please view results for these tests on the individual orders.       All other labs were within normal range or not returned as of this dictation.    Imaging  XR chest 1 view   Final Result   1.  Emphysematous changes in the upper lobes without evidence of   consolidation or sizable pleural effusion.                  MACRO:   None        Signed by: Mayda Jernigan 5/24/2024 11:45 PM   Dictation workstation:   [id number]           Procedures  Procedures     EMERGENCY DEPARTMENT COURSE/MDM:   Medical Decision Making  70-year-old male past medical history of A-fib, on Xarelto, COPD on 2 to 3 L of oxygen at baseline, a presented today with a chief complaint of a tight sensation in his throat/chest.  Patient was off of his oxygen while he was at home for a few hours unknown to him.  This is around the time that this sensation develop.  Continues on that sensation on arrival to the ER.  Vital signs notable for a blood pressure of 105/74, heart rate of 124, concern for A-fib RVR, tachypneic to 22.  No obvious infection source at this time.  Differential includes but is not limited to COPD exacerbation, A-fib RVR, ACS, pneumonia.   We will proceed with usual chest pain work-up including EKG, chest x-ray, CBC, CMP, serial troponin, BNP to rule out acute coronary syndrome.  Patient was placed on his baseline oxygen.  A-fib RVR initially treated with a bolus of 5 mg of IV diltiazem followed by a diltiazem drip.          Please see ED course for additional MDM.    ED Course as of 05/25/24 0150   Sat May 25, 2024   0140 Multiple attempts were made to discuss the patient with cardiology, patient was never returned.  Patient was overall comfortable with plan for  discharge with close cardiology follow-up. [CL]   0148 Shortly after initiating Cardizem drip and bolus, patient converted back to normal sinus rhythm. [CL]   0149 EKG was without ischemic changes.  Lab work was unremarkable with normal/adequately downtrending troponin.  Chest x-ray without evidence of pneumothorax, pneumonia, pneumomediastinum, or other abnormalities that would require admission.  Very low concern for ACS at this time as a cause of the patient's symptoms.  Admission was considered, but not indicated given a heart score less than 4 and low clinical suspicion for ACS. They were in agreement with plan for discharge home.  Return precautions were discussed. [CL]      ED Course User Index  [CL] Hunter Troy,          Diagnoses as of 05/25/24 0150   Atrial fibrillation with RVR (Multi)   Chest pressure        Patient and or family in agreement and understanding of treatment plan.  All questions answered.      I reviewed the case with the attending ED physician. The attending ED physician agrees with the plan. Patient and/or patient´s representative was counseled regarding labs, imaging, likely diagnosis, and plan. All questions were answered.    ED Medications administered this visit:    Medications   dilTIAZem (Cardizem) 125 mg in dextrose 5% 125 mL (1 mg/mL) infusion (premix) (0 mg/hr intravenous Stopped 5/24/24 2315)   oxygen (O2) therapy (4 L/min inhalation Start 5/24/24 2235)   potassium chloride CR (Klor-Con M20) ER tablet 40 mEq (40 mEq oral Not Given 5/24/24 2340)   dilTIAZem (Cardizem) injection 5 mg (5 mg intravenous Given 5/24/24 2238)   ipratropium-albuteroL (Duo-Neb) 0.5-2.5 mg/3 mL nebulizer solution 3 mL (3 mL nebulization Given 5/25/24 0019)       New Prescriptions from this visit:    Discharge Medication List as of 5/25/2024  1:37 AM          Follow-up:  Paul Ibrahim MD  [address], [facility] [address]  [facility] [address] [phone]    Schedule an appointment as soon as  possible for a visit       Silvestre Rock DO  [address] [facility] [address]  [phone]    Schedule an appointment as soon as possible for a visit           Final Impression:   1. Atrial fibrillation with RVR (Multi)    2. Chest pressure          (Please note that portions of this note were completed with a voice recognition program.  Efforts were made to edit the dictations but occasionally words are mis-transcribed.)     Hunter Troy DO  Resident  05/25/24 0150

## 2024-05-27 ENCOUNTER — HOSPITAL ENCOUNTER (INPATIENT)
Facility: HOSPITAL | Age: 74
LOS: 1 days | Discharge: HOME | DRG: 309 | End: 2024-05-27
Attending: EMERGENCY MEDICINE | Admitting: INTERNAL MEDICINE
Payer: MEDICARE

## 2024-05-27 ENCOUNTER — APPOINTMENT (OUTPATIENT)
Dept: RADIOLOGY | Facility: HOSPITAL | Age: 74
DRG: 309 | End: 2024-05-27
Payer: MEDICARE

## 2024-05-27 ENCOUNTER — HOSPITAL ENCOUNTER (OUTPATIENT)
Dept: CARDIOLOGY | Facility: HOSPITAL | Age: 74
Discharge: HOME | End: 2024-05-27
Payer: MEDICARE

## 2024-05-27 ENCOUNTER — APPOINTMENT (OUTPATIENT)
Dept: CARDIOLOGY | Facility: HOSPITAL | Age: 74
DRG: 309 | End: 2024-05-27
Payer: MEDICARE

## 2024-05-27 VITALS
WEIGHT: 150.35 LBS | BODY MASS INDEX: 21.53 KG/M2 | SYSTOLIC BLOOD PRESSURE: 129 MMHG | DIASTOLIC BLOOD PRESSURE: 61 MMHG | HEIGHT: 70 IN | TEMPERATURE: 97 F | RESPIRATION RATE: 24 BRPM | OXYGEN SATURATION: 97 % | HEART RATE: 76 BPM

## 2024-05-27 DIAGNOSIS — F41.9 ANXIETY: ICD-10-CM

## 2024-05-27 DIAGNOSIS — I48.91 ATRIAL FIBRILLATION WITH RVR (MULTI): Primary | ICD-10-CM

## 2024-05-27 PROBLEM — R73.9 HYPERGLYCEMIA: Status: ACTIVE | Noted: 2024-05-27

## 2024-05-27 PROBLEM — J96.21 ACUTE ON CHRONIC HYPOXIC RESPIRATORY FAILURE (MULTI): Status: RESOLVED | Noted: 2024-05-10 | Resolved: 2024-05-27

## 2024-05-27 LAB
ALBUMIN SERPL BCP-MCNC: 3.2 G/DL (ref 3.4–5)
ALBUMIN SERPL BCP-MCNC: 3.6 G/DL (ref 3.4–5)
ALP SERPL-CCNC: 41 U/L (ref 33–136)
ALP SERPL-CCNC: 44 U/L (ref 33–136)
ALT SERPL W P-5'-P-CCNC: 27 U/L (ref 10–52)
ALT SERPL W P-5'-P-CCNC: 30 U/L (ref 10–52)
ANION GAP SERPL CALC-SCNC: 11 MMOL/L (ref 10–20)
ANION GAP SERPL CALC-SCNC: 13 MMOL/L (ref 10–20)
AST SERPL W P-5'-P-CCNC: 11 U/L (ref 9–39)
AST SERPL W P-5'-P-CCNC: 12 U/L (ref 9–39)
ATRIAL RATE: 75 BPM
ATRIAL RATE: 95 BPM
BASOPHILS # BLD AUTO: 0 X10*3/UL (ref 0–0.1)
BASOPHILS NFR BLD AUTO: 0 %
BILIRUB SERPL-MCNC: 0.5 MG/DL (ref 0–1.2)
BILIRUB SERPL-MCNC: 0.5 MG/DL (ref 0–1.2)
BNP SERPL-MCNC: 107 PG/ML (ref 0–99)
BUN SERPL-MCNC: 18 MG/DL (ref 6–23)
BUN SERPL-MCNC: 19 MG/DL (ref 6–23)
CALCIUM SERPL-MCNC: 7.4 MG/DL (ref 8.6–10.3)
CALCIUM SERPL-MCNC: 8.6 MG/DL (ref 8.6–10.3)
CARDIAC TROPONIN I PNL SERPL HS: 5 NG/L (ref 0–20)
CARDIAC TROPONIN I PNL SERPL HS: 7 NG/L (ref 0–20)
CHLORIDE SERPL-SCNC: 105 MMOL/L (ref 98–107)
CHLORIDE SERPL-SCNC: 109 MMOL/L (ref 98–107)
CO2 SERPL-SCNC: 28 MMOL/L (ref 21–32)
CO2 SERPL-SCNC: 30 MMOL/L (ref 21–32)
CREAT SERPL-MCNC: 0.72 MG/DL (ref 0.5–1.3)
CREAT SERPL-MCNC: 0.78 MG/DL (ref 0.5–1.3)
EGFRCR SERPLBLD CKD-EPI 2021: >90 ML/MIN/1.73M*2
EGFRCR SERPLBLD CKD-EPI 2021: >90 ML/MIN/1.73M*2
EOSINOPHIL # BLD AUTO: 0 X10*3/UL (ref 0–0.4)
EOSINOPHIL NFR BLD AUTO: 0 %
ERYTHROCYTE [DISTWIDTH] IN BLOOD BY AUTOMATED COUNT: 14.9 % (ref 11.5–14.5)
ERYTHROCYTE [DISTWIDTH] IN BLOOD BY AUTOMATED COUNT: 15 % (ref 11.5–14.5)
GLUCOSE SERPL-MCNC: 116 MG/DL (ref 74–99)
GLUCOSE SERPL-MCNC: 194 MG/DL (ref 74–99)
HCT VFR BLD AUTO: 34.7 % (ref 41–52)
HCT VFR BLD AUTO: 39.7 % (ref 41–52)
HGB BLD-MCNC: 10.5 G/DL (ref 13.5–17.5)
HGB BLD-MCNC: 12.2 G/DL (ref 13.5–17.5)
HOLD SPECIMEN: NORMAL
IMM GRANULOCYTES # BLD AUTO: 0.13 X10*3/UL (ref 0–0.5)
IMM GRANULOCYTES NFR BLD AUTO: 1.3 % (ref 0–0.9)
INR PPP: 1 (ref 0.9–1.1)
LYMPHOCYTES # BLD AUTO: 0.2 X10*3/UL (ref 0.8–3)
LYMPHOCYTES NFR BLD AUTO: 2 %
MAGNESIUM SERPL-MCNC: 1.93 MG/DL (ref 1.6–2.4)
MCH RBC QN AUTO: 28.8 PG (ref 26–34)
MCH RBC QN AUTO: 29 PG (ref 26–34)
MCHC RBC AUTO-ENTMCNC: 30.3 G/DL (ref 32–36)
MCHC RBC AUTO-ENTMCNC: 30.7 G/DL (ref 32–36)
MCV RBC AUTO: 94 FL (ref 80–100)
MCV RBC AUTO: 96 FL (ref 80–100)
MONOCYTES # BLD AUTO: 0.28 X10*3/UL (ref 0.05–0.8)
MONOCYTES NFR BLD AUTO: 2.8 %
NEUTROPHILS # BLD AUTO: 9.3 X10*3/UL (ref 1.6–5.5)
NEUTROPHILS NFR BLD AUTO: 93.9 %
NRBC BLD-RTO: 0 /100 WBCS (ref 0–0)
NRBC BLD-RTO: 0 /100 WBCS (ref 0–0)
P AXIS: 75 DEGREES
P AXIS: 79 DEGREES
P OFFSET: 215 MS
P OFFSET: 220 MS
P ONSET: 167 MS
P ONSET: 171 MS
PLATELET # BLD AUTO: 165 X10*3/UL (ref 150–450)
PLATELET # BLD AUTO: 208 X10*3/UL (ref 150–450)
POTASSIUM SERPL-SCNC: 3.7 MMOL/L (ref 3.5–5.3)
POTASSIUM SERPL-SCNC: 4.7 MMOL/L (ref 3.5–5.3)
PR INTERVAL: 110 MS
PR INTERVAL: 120 MS
PROT SERPL-MCNC: 5.2 G/DL (ref 6.4–8.2)
PROT SERPL-MCNC: 5.6 G/DL (ref 6.4–8.2)
PROTHROMBIN TIME: 11 SECONDS (ref 9.8–12.8)
Q ONSET: 226 MS
Q ONSET: 227 MS
QRS COUNT: 13 BEATS
QRS COUNT: 16 BEATS
QRS DURATION: 80 MS
QRS DURATION: 80 MS
QT INTERVAL: 348 MS
QT INTERVAL: 388 MS
QTC CALCULATION(BAZETT): 433 MS
QTC CALCULATION(BAZETT): 437 MS
QTC FREDERICIA: 405 MS
QTC FREDERICIA: 417 MS
R AXIS: 84 DEGREES
R AXIS: 90 DEGREES
RBC # BLD AUTO: 3.62 X10*6/UL (ref 4.5–5.9)
RBC # BLD AUTO: 4.23 X10*6/UL (ref 4.5–5.9)
SODIUM SERPL-SCNC: 143 MMOL/L (ref 136–145)
SODIUM SERPL-SCNC: 144 MMOL/L (ref 136–145)
T AXIS: 69 DEGREES
T AXIS: 75 DEGREES
T OFFSET: 401 MS
T OFFSET: 420 MS
VENTRICULAR RATE: 75 BPM
VENTRICULAR RATE: 95 BPM
WBC # BLD AUTO: 11.7 X10*3/UL (ref 4.4–11.3)
WBC # BLD AUTO: 9.9 X10*3/UL (ref 4.4–11.3)

## 2024-05-27 PROCEDURE — 2500000004 HC RX 250 GENERAL PHARMACY W/ HCPCS (ALT 636 FOR OP/ED): Mod: JZ | Performed by: INTERNAL MEDICINE

## 2024-05-27 PROCEDURE — 85610 PROTHROMBIN TIME: CPT | Performed by: EMERGENCY MEDICINE

## 2024-05-27 PROCEDURE — 2500000001 HC RX 250 WO HCPCS SELF ADMINISTERED DRUGS (ALT 637 FOR MEDICARE OP): Performed by: INTERNAL MEDICINE

## 2024-05-27 PROCEDURE — 71045 X-RAY EXAM CHEST 1 VIEW: CPT

## 2024-05-27 PROCEDURE — 93005 ELECTROCARDIOGRAM TRACING: CPT

## 2024-05-27 PROCEDURE — 2500000004 HC RX 250 GENERAL PHARMACY W/ HCPCS (ALT 636 FOR OP/ED)

## 2024-05-27 PROCEDURE — 84484 ASSAY OF TROPONIN QUANT: CPT | Performed by: EMERGENCY MEDICINE

## 2024-05-27 PROCEDURE — 2500000002 HC RX 250 W HCPCS SELF ADMINISTERED DRUGS (ALT 637 FOR MEDICARE OP, ALT 636 FOR OP/ED): Performed by: INTERNAL MEDICINE

## 2024-05-27 PROCEDURE — 36415 COLL VENOUS BLD VENIPUNCTURE: CPT | Performed by: EMERGENCY MEDICINE

## 2024-05-27 PROCEDURE — 71045 X-RAY EXAM CHEST 1 VIEW: CPT | Performed by: STUDENT IN AN ORGANIZED HEALTH CARE EDUCATION/TRAINING PROGRAM

## 2024-05-27 PROCEDURE — 2500000004 HC RX 250 GENERAL PHARMACY W/ HCPCS (ALT 636 FOR OP/ED): Performed by: EMERGENCY MEDICINE

## 2024-05-27 PROCEDURE — 99239 HOSP IP/OBS DSCHRG MGMT >30: CPT | Performed by: INTERNAL MEDICINE

## 2024-05-27 PROCEDURE — 83880 ASSAY OF NATRIURETIC PEPTIDE: CPT | Performed by: EMERGENCY MEDICINE

## 2024-05-27 PROCEDURE — 2500000004 HC RX 250 GENERAL PHARMACY W/ HCPCS (ALT 636 FOR OP/ED): Performed by: INTERNAL MEDICINE

## 2024-05-27 PROCEDURE — 94640 AIRWAY INHALATION TREATMENT: CPT

## 2024-05-27 PROCEDURE — 2500000002 HC RX 250 W HCPCS SELF ADMINISTERED DRUGS (ALT 637 FOR MEDICARE OP, ALT 636 FOR OP/ED): Mod: MUE | Performed by: INTERNAL MEDICINE

## 2024-05-27 PROCEDURE — 2500000002 HC RX 250 W HCPCS SELF ADMINISTERED DRUGS (ALT 637 FOR MEDICARE OP, ALT 636 FOR OP/ED): Performed by: EMERGENCY MEDICINE

## 2024-05-27 PROCEDURE — 83735 ASSAY OF MAGNESIUM: CPT | Performed by: EMERGENCY MEDICINE

## 2024-05-27 PROCEDURE — 93010 ELECTROCARDIOGRAM REPORT: CPT | Performed by: INTERNAL MEDICINE

## 2024-05-27 PROCEDURE — P9045 ALBUMIN (HUMAN), 5%, 250 ML: HCPCS | Mod: JZ | Performed by: INTERNAL MEDICINE

## 2024-05-27 PROCEDURE — 80053 COMPREHEN METABOLIC PANEL: CPT | Performed by: EMERGENCY MEDICINE

## 2024-05-27 PROCEDURE — 99291 CRITICAL CARE FIRST HOUR: CPT | Performed by: EMERGENCY MEDICINE

## 2024-05-27 PROCEDURE — 85025 COMPLETE CBC W/AUTO DIFF WBC: CPT | Performed by: EMERGENCY MEDICINE

## 2024-05-27 PROCEDURE — 2060000001 HC INTERMEDIATE ICU ROOM DAILY

## 2024-05-27 PROCEDURE — 99223 1ST HOSP IP/OBS HIGH 75: CPT | Performed by: INTERNAL MEDICINE

## 2024-05-27 PROCEDURE — 2500000005 HC RX 250 GENERAL PHARMACY W/O HCPCS: Performed by: EMERGENCY MEDICINE

## 2024-05-27 PROCEDURE — 96374 THER/PROPH/DIAG INJ IV PUSH: CPT

## 2024-05-27 PROCEDURE — 84484 ASSAY OF TROPONIN QUANT: CPT | Mod: 91 | Performed by: EMERGENCY MEDICINE

## 2024-05-27 PROCEDURE — 80053 COMPREHEN METABOLIC PANEL: CPT | Mod: 91,MUE | Performed by: INTERNAL MEDICINE

## 2024-05-27 PROCEDURE — 2500000001 HC RX 250 WO HCPCS SELF ADMINISTERED DRUGS (ALT 637 FOR MEDICARE OP): Performed by: EMERGENCY MEDICINE

## 2024-05-27 PROCEDURE — 2500000005 HC RX 250 GENERAL PHARMACY W/O HCPCS: Performed by: INTERNAL MEDICINE

## 2024-05-27 PROCEDURE — 85027 COMPLETE CBC AUTOMATED: CPT | Performed by: INTERNAL MEDICINE

## 2024-05-27 RX ORDER — PANTOPRAZOLE SODIUM 40 MG/10ML
40 INJECTION, POWDER, LYOPHILIZED, FOR SOLUTION INTRAVENOUS
Status: DISCONTINUED | OUTPATIENT
Start: 2024-05-27 | End: 2024-05-27

## 2024-05-27 RX ORDER — TALC
3 POWDER (GRAM) TOPICAL NIGHTLY PRN
Status: DISCONTINUED | OUTPATIENT
Start: 2024-05-27 | End: 2024-05-27 | Stop reason: HOSPADM

## 2024-05-27 RX ORDER — POLYETHYLENE GLYCOL 3350 17 G/17G
17 POWDER, FOR SOLUTION ORAL DAILY PRN
Status: DISCONTINUED | OUTPATIENT
Start: 2024-05-27 | End: 2024-05-27 | Stop reason: HOSPADM

## 2024-05-27 RX ORDER — NEBIVOLOL 2.5 MG/1
5 TABLET ORAL NIGHTLY
Qty: 60 TABLET | Refills: 0 | Status: SHIPPED | OUTPATIENT
Start: 2024-05-27 | End: 2024-05-27

## 2024-05-27 RX ORDER — NEBIVOLOL 2.5 MG/1
2.5 TABLET ORAL NIGHTLY
Status: DISCONTINUED | OUTPATIENT
Start: 2024-05-27 | End: 2024-05-27 | Stop reason: HOSPADM

## 2024-05-27 RX ORDER — IPRATROPIUM BROMIDE AND ALBUTEROL SULFATE 2.5; .5 MG/3ML; MG/3ML
3 SOLUTION RESPIRATORY (INHALATION) EVERY 6 HOURS
Status: DISCONTINUED | OUTPATIENT
Start: 2024-05-27 | End: 2024-05-27

## 2024-05-27 RX ORDER — AMMONIUM LACTATE 12 G/100G
1 CREAM TOPICAL 2 TIMES DAILY PRN
Status: DISCONTINUED | OUTPATIENT
Start: 2024-05-27 | End: 2024-05-27 | Stop reason: HOSPADM

## 2024-05-27 RX ORDER — POTASSIUM CHLORIDE 20 MEQ/1
20 TABLET, EXTENDED RELEASE ORAL DAILY
Qty: 30 TABLET | Refills: 0 | Status: SHIPPED | OUTPATIENT
Start: 2024-05-27 | End: 2024-06-26

## 2024-05-27 RX ORDER — LANOLIN ALCOHOL/MO/W.PET/CERES
800 CREAM (GRAM) TOPICAL ONCE
Status: COMPLETED | OUTPATIENT
Start: 2024-05-27 | End: 2024-05-27

## 2024-05-27 RX ORDER — POTASSIUM CHLORIDE 20 MEQ/1
40 TABLET, EXTENDED RELEASE ORAL ONCE
Status: COMPLETED | OUTPATIENT
Start: 2024-05-27 | End: 2024-05-27

## 2024-05-27 RX ORDER — ONDANSETRON 4 MG/1
4 TABLET, ORALLY DISINTEGRATING ORAL EVERY 8 HOURS PRN
Status: DISCONTINUED | OUTPATIENT
Start: 2024-05-27 | End: 2024-05-27

## 2024-05-27 RX ORDER — NEBIVOLOL 2.5 MG/1
2.5 TABLET ORAL 2 TIMES DAILY
Qty: 60 TABLET | Refills: 0 | Status: SHIPPED | OUTPATIENT
Start: 2024-05-27

## 2024-05-27 RX ORDER — NEBIVOLOL 2.5 MG/1
2.5 TABLET ORAL ONCE
Status: COMPLETED | OUTPATIENT
Start: 2024-05-27 | End: 2024-05-27

## 2024-05-27 RX ORDER — METOCLOPRAMIDE 10 MG/1
10 TABLET ORAL EVERY 6 HOURS PRN
Status: DISCONTINUED | OUTPATIENT
Start: 2024-05-27 | End: 2024-05-27

## 2024-05-27 RX ORDER — PREDNISONE 20 MG/1
20 TABLET ORAL DAILY
Status: DISCONTINUED | OUTPATIENT
Start: 2024-06-03 | End: 2024-05-27 | Stop reason: HOSPADM

## 2024-05-27 RX ORDER — PREDNISONE 10 MG/1
10 TABLET ORAL DAILY
Status: DISCONTINUED | OUTPATIENT
Start: 2024-06-10 | End: 2024-05-27 | Stop reason: HOSPADM

## 2024-05-27 RX ORDER — METOPROLOL SUCCINATE 25 MG/1
25 TABLET, EXTENDED RELEASE ORAL ONCE
Status: DISCONTINUED | OUTPATIENT
Start: 2024-05-27 | End: 2024-05-27

## 2024-05-27 RX ORDER — DILTIAZEM HCL/D5W 125 MG/125
PLASTIC BAG, INJECTION (ML) INTRAVENOUS
Status: COMPLETED
Start: 2024-05-27 | End: 2024-05-27

## 2024-05-27 RX ORDER — BUDESONIDE 0.5 MG/2ML
1 INHALANT ORAL
Status: DISCONTINUED | OUTPATIENT
Start: 2024-05-27 | End: 2024-05-27 | Stop reason: HOSPADM

## 2024-05-27 RX ORDER — METOCLOPRAMIDE HYDROCHLORIDE 5 MG/ML
10 INJECTION INTRAMUSCULAR; INTRAVENOUS EVERY 6 HOURS PRN
Status: DISCONTINUED | OUTPATIENT
Start: 2024-05-27 | End: 2024-05-27

## 2024-05-27 RX ORDER — FLUTICASONE PROPIONATE 50 MCG
2 SPRAY, SUSPENSION (ML) NASAL 2 TIMES DAILY
Status: DISCONTINUED | OUTPATIENT
Start: 2024-05-27 | End: 2024-05-27 | Stop reason: HOSPADM

## 2024-05-27 RX ORDER — CALCIUM CARBONATE 300MG(750)
400 TABLET,CHEWABLE ORAL DAILY
Qty: 30 TABLET | Refills: 0 | Status: SHIPPED | OUTPATIENT
Start: 2024-05-27 | End: 2024-06-26

## 2024-05-27 RX ORDER — IPRATROPIUM BROMIDE AND ALBUTEROL SULFATE 2.5; .5 MG/3ML; MG/3ML
3 SOLUTION RESPIRATORY (INHALATION) ONCE
Status: COMPLETED | OUTPATIENT
Start: 2024-05-27 | End: 2024-05-27

## 2024-05-27 RX ORDER — MONTELUKAST SODIUM 10 MG/1
10 TABLET ORAL NIGHTLY
Status: DISCONTINUED | OUTPATIENT
Start: 2024-05-27 | End: 2024-05-27 | Stop reason: HOSPADM

## 2024-05-27 RX ORDER — SODIUM CHLORIDE 9 MG/ML
50 INJECTION, SOLUTION INTRAVENOUS CONTINUOUS
Status: DISCONTINUED | OUTPATIENT
Start: 2024-05-27 | End: 2024-05-27

## 2024-05-27 RX ORDER — ACETAMINOPHEN 325 MG/1
975 TABLET ORAL EVERY 8 HOURS PRN
Status: DISCONTINUED | OUTPATIENT
Start: 2024-05-27 | End: 2024-05-27 | Stop reason: HOSPADM

## 2024-05-27 RX ORDER — ACETAMINOPHEN 650 MG/1
650 SUPPOSITORY RECTAL EVERY 4 HOURS PRN
Status: DISCONTINUED | OUTPATIENT
Start: 2024-05-27 | End: 2024-05-27

## 2024-05-27 RX ORDER — ACETAMINOPHEN 160 MG/5ML
650 SOLUTION ORAL EVERY 4 HOURS PRN
Status: DISCONTINUED | OUTPATIENT
Start: 2024-05-27 | End: 2024-05-27

## 2024-05-27 RX ORDER — ONDANSETRON HYDROCHLORIDE 2 MG/ML
4 INJECTION, SOLUTION INTRAVENOUS EVERY 8 HOURS PRN
Status: DISCONTINUED | OUTPATIENT
Start: 2024-05-27 | End: 2024-05-27 | Stop reason: HOSPADM

## 2024-05-27 RX ORDER — TAMSULOSIN HYDROCHLORIDE 0.4 MG/1
0.4 CAPSULE ORAL NIGHTLY
Status: DISCONTINUED | OUTPATIENT
Start: 2024-05-27 | End: 2024-05-27 | Stop reason: HOSPADM

## 2024-05-27 RX ORDER — ALBUMIN HUMAN 50 G/1000ML
25 SOLUTION INTRAVENOUS ONCE
Status: COMPLETED | OUTPATIENT
Start: 2024-05-27 | End: 2024-05-27

## 2024-05-27 RX ORDER — METOPROLOL TARTRATE 1 MG/ML
5 INJECTION, SOLUTION INTRAVENOUS ONCE
Status: DISCONTINUED | OUTPATIENT
Start: 2024-05-27 | End: 2024-05-27

## 2024-05-27 RX ORDER — ACETAMINOPHEN 325 MG/1
650 TABLET ORAL EVERY 4 HOURS PRN
Status: DISCONTINUED | OUTPATIENT
Start: 2024-05-27 | End: 2024-05-27

## 2024-05-27 RX ORDER — PANTOPRAZOLE SODIUM 40 MG/1
40 TABLET, DELAYED RELEASE ORAL
Status: DISCONTINUED | OUTPATIENT
Start: 2024-05-27 | End: 2024-05-27

## 2024-05-27 RX ORDER — BUSPIRONE HYDROCHLORIDE 5 MG/1
5 TABLET ORAL 2 TIMES DAILY
Qty: 60 TABLET | Refills: 0 | Status: SHIPPED | OUTPATIENT
Start: 2024-05-27

## 2024-05-27 RX ORDER — ROFLUMILAST 500 UG/1
500 TABLET ORAL DAILY
Status: DISCONTINUED | OUTPATIENT
Start: 2024-05-27 | End: 2024-05-27 | Stop reason: HOSPADM

## 2024-05-27 RX ORDER — DILTIAZEM HCL/D5W 125 MG/125
5-15 PLASTIC BAG, INJECTION (ML) INTRAVENOUS CONTINUOUS
Status: DISCONTINUED | OUTPATIENT
Start: 2024-05-27 | End: 2024-05-27 | Stop reason: HOSPADM

## 2024-05-27 RX ORDER — IPRATROPIUM BROMIDE AND ALBUTEROL SULFATE 2.5; .5 MG/3ML; MG/3ML
3 SOLUTION RESPIRATORY (INHALATION)
Status: DISCONTINUED | OUTPATIENT
Start: 2024-05-27 | End: 2024-05-27 | Stop reason: HOSPADM

## 2024-05-27 RX ADMIN — SODIUM CHLORIDE 50 ML/HR: 9 INJECTION, SOLUTION INTRAVENOUS at 04:55

## 2024-05-27 RX ADMIN — POTASSIUM CHLORIDE 40 MEQ: 1500 TABLET, EXTENDED RELEASE ORAL at 01:33

## 2024-05-27 RX ADMIN — Medication 3 L/MIN: at 00:55

## 2024-05-27 RX ADMIN — FLUTICASONE PROPIONATE 2 SPRAY: 50 SPRAY, METERED NASAL at 11:54

## 2024-05-27 RX ADMIN — Medication 125 MG: at 00:47

## 2024-05-27 RX ADMIN — Medication 3 L/MIN: at 08:00

## 2024-05-27 RX ADMIN — PREDNISONE 30 MG: 20 TABLET ORAL at 08:44

## 2024-05-27 RX ADMIN — ALBUMIN (HUMAN) 25 G: 12.5 SOLUTION INTRAVENOUS at 08:38

## 2024-05-27 RX ADMIN — NEBIVOLOL 2.5 MG: 2.5 TABLET ORAL at 02:00

## 2024-05-27 RX ADMIN — IPRATROPIUM BROMIDE AND ALBUTEROL SULFATE 3 ML: 2.5; .5 SOLUTION RESPIRATORY (INHALATION) at 16:28

## 2024-05-27 RX ADMIN — IPRATROPIUM BROMIDE AND ALBUTEROL SULFATE 3 ML: 2.5; .5 SOLUTION RESPIRATORY (INHALATION) at 08:04

## 2024-05-27 RX ADMIN — IPRATROPIUM BROMIDE AND ALBUTEROL SULFATE 3 ML: 2.5; .5 SOLUTION RESPIRATORY (INHALATION) at 12:23

## 2024-05-27 RX ADMIN — IPRATROPIUM BROMIDE AND ALBUTEROL SULFATE 3 ML: 2.5; .5 SOLUTION RESPIRATORY (INHALATION) at 02:30

## 2024-05-27 RX ADMIN — Medication 3 L/MIN: at 11:30

## 2024-05-27 RX ADMIN — Medication 800 MG: at 01:34

## 2024-05-27 RX ADMIN — PANTOPRAZOLE SODIUM 40 MG: 40 TABLET, DELAYED RELEASE ORAL at 05:02

## 2024-05-27 RX ADMIN — BUDESONIDE 1 MG: 0.5 INHALANT RESPIRATORY (INHALATION) at 08:04

## 2024-05-27 RX ADMIN — NEBIVOLOL 2.5 MG: 2.5 TABLET ORAL at 11:05

## 2024-05-27 RX ADMIN — RIVAROXABAN 15 MG: 15 TABLET, FILM COATED ORAL at 08:44

## 2024-05-27 RX ADMIN — ROFLUMILAST 500 MCG: 500 TABLET ORAL at 11:05

## 2024-05-27 SDOH — SOCIAL STABILITY: SOCIAL INSECURITY: WERE YOU ABLE TO COMPLETE ALL THE BEHAVIORAL HEALTH SCREENINGS?: YES

## 2024-05-27 SDOH — SOCIAL STABILITY: SOCIAL INSECURITY: HAVE YOU HAD ANY THOUGHTS OF HARMING ANYONE ELSE?: NO

## 2024-05-27 SDOH — SOCIAL STABILITY: SOCIAL INSECURITY: DO YOU FEEL ANYONE HAS EXPLOITED OR TAKEN ADVANTAGE OF YOU FINANCIALLY OR OF YOUR PERSONAL PROPERTY?: NO

## 2024-05-27 SDOH — SOCIAL STABILITY: SOCIAL INSECURITY: ABUSE: ADULT

## 2024-05-27 SDOH — SOCIAL STABILITY: SOCIAL INSECURITY: DO YOU FEEL UNSAFE GOING BACK TO THE PLACE WHERE YOU ARE LIVING?: NO

## 2024-05-27 SDOH — SOCIAL STABILITY: SOCIAL INSECURITY: ARE THERE ANY APPARENT SIGNS OF INJURIES/BEHAVIORS THAT COULD BE RELATED TO ABUSE/NEGLECT?: NO

## 2024-05-27 SDOH — SOCIAL STABILITY: SOCIAL INSECURITY: ARE YOU OR HAVE YOU BEEN THREATENED OR ABUSED PHYSICALLY, EMOTIONALLY, OR SEXUALLY BY ANYONE?: NO

## 2024-05-27 SDOH — SOCIAL STABILITY: SOCIAL INSECURITY: HAS ANYONE EVER THREATENED TO HURT YOUR FAMILY OR YOUR PETS?: NO

## 2024-05-27 SDOH — SOCIAL STABILITY: SOCIAL INSECURITY: DOES ANYONE TRY TO KEEP YOU FROM HAVING/CONTACTING OTHER FRIENDS OR DOING THINGS OUTSIDE YOUR HOME?: NO

## 2024-05-27 SDOH — SOCIAL STABILITY: SOCIAL INSECURITY: HAVE YOU HAD THOUGHTS OF HARMING ANYONE ELSE?: NO

## 2024-05-27 ASSESSMENT — COGNITIVE AND FUNCTIONAL STATUS - GENERAL
PATIENT BASELINE BEDBOUND: NO
MOBILITY SCORE: 24
DAILY ACTIVITIY SCORE: 24

## 2024-05-27 ASSESSMENT — LIFESTYLE VARIABLES
HOW OFTEN DO YOU HAVE A DRINK CONTAINING ALCOHOL: NEVER
HOW OFTEN DO YOU HAVE 6 OR MORE DRINKS ON ONE OCCASION: NEVER
HOW MANY STANDARD DRINKS CONTAINING ALCOHOL DO YOU HAVE ON A TYPICAL DAY: PATIENT DOES NOT DRINK
SKIP TO QUESTIONS 9-10: 1
AUDIT-C TOTAL SCORE: 0
TOTAL SCORE: 0
AUDIT-C TOTAL SCORE: 0
HAVE PEOPLE ANNOYED YOU BY CRITICIZING YOUR DRINKING: NO
EVER FELT BAD OR GUILTY ABOUT YOUR DRINKING: NO
HAVE YOU EVER FELT YOU SHOULD CUT DOWN ON YOUR DRINKING: NO
EVER HAD A DRINK FIRST THING IN THE MORNING TO STEADY YOUR NERVES TO GET RID OF A HANGOVER: NO

## 2024-05-27 ASSESSMENT — PATIENT HEALTH QUESTIONNAIRE - PHQ9
SUM OF ALL RESPONSES TO PHQ9 QUESTIONS 1 & 2: 0
1. LITTLE INTEREST OR PLEASURE IN DOING THINGS: NOT AT ALL
2. FEELING DOWN, DEPRESSED OR HOPELESS: NOT AT ALL

## 2024-05-27 ASSESSMENT — PAIN SCALES - GENERAL
PAINLEVEL_OUTOF10: 0 - NO PAIN

## 2024-05-27 ASSESSMENT — PAIN - FUNCTIONAL ASSESSMENT
PAIN_FUNCTIONAL_ASSESSMENT: 0-10

## 2024-05-27 ASSESSMENT — ACTIVITIES OF DAILY LIVING (ADL)
TOILETING: INDEPENDENT
HEARING - LEFT EAR: FUNCTIONAL
HEARING - RIGHT EAR: FUNCTIONAL
LACK_OF_TRANSPORTATION: NO
WALKS IN HOME: INDEPENDENT
JUDGMENT_ADEQUATE_SAFELY_COMPLETE_DAILY_ACTIVITIES: YES
PATIENT'S MEMORY ADEQUATE TO SAFELY COMPLETE DAILY ACTIVITIES?: YES
FEEDING YOURSELF: INDEPENDENT
ADEQUATE_TO_COMPLETE_ADL: YES
GROOMING: INDEPENDENT
BATHING: INDEPENDENT
DRESSING YOURSELF: INDEPENDENT

## 2024-05-27 ASSESSMENT — COLUMBIA-SUICIDE SEVERITY RATING SCALE - C-SSRS
1. IN THE PAST MONTH, HAVE YOU WISHED YOU WERE DEAD OR WISHED YOU COULD GO TO SLEEP AND NOT WAKE UP?: NO
6. HAVE YOU EVER DONE ANYTHING, STARTED TO DO ANYTHING, OR PREPARED TO DO ANYTHING TO END YOUR LIFE?: NO
2. HAVE YOU ACTUALLY HAD ANY THOUGHTS OF KILLING YOURSELF?: NO

## 2024-05-27 ASSESSMENT — ENCOUNTER SYMPTOMS: SHORTNESS OF BREATH: 1

## 2024-05-27 NOTE — CARE PLAN
Problem: Safety  Goal: Patient will be injury free during hospitalization  Outcome: Met     Problem: Daily Care  Goal: Daily care needs are met  Outcome: Met     Problem: Psychosocial Needs  Goal: Demonstrates ability to cope with hospitalization/illness  Outcome: Met     Problem: Discharge Barriers  Goal: My discharge needs are met  Outcome: Met     Problem: Arrythmia/Dysrhythmia  Goal: Vital signs return to baseline  Outcome: Met   The patient's goals for the shift include      Pt alert and oriented. Pt on Cardizem gtt at beginning of shift, drip discontinued due to decreased BP per Dr. Israel. Pt converted to NSR during shift, EKG obtained, team notified. Pt educated on discharge instructions. Pt discharged home. Safety maintained.

## 2024-05-27 NOTE — NURSING NOTE
"Cardiovascular Nurse Navigator Education     Noting patient with stage IV COPD, chronic hypoxic respiratory failure, and paroxysmal a-fib returns with a-fib/RVR. I met him during his most recent admit 5/4/24-5/12/24 for COPD exac and PNA. Echo that admit shows EF 55% with basal and mid inferior septum and anteroseptal segments abnormal and impaired relaxation of LV diastolic filling. He returns this admit with palpitations and mild SOB after being seen in the ER 48 hr ago for similar symptoms. He was treated with a Diltiazem drip and became hypotensive with SBP 80's this am and it was discontinued. Dr. Randolph rounded and his Nevibilol was increased.  Upon meeting with pt today, he remains in a-fib with HR 's with congested cough and 2+ BLE edema. I applied ace wraps and encouraged him to keep BLE elevated when at rest. We discussed heart rate/BP monitoring at home and when to notify cardiology. He was resistive to sodium restrictions but agrees to symptom monitoring. He lives alone and was beginning to think about who he could ask to assist him with keeping BLE ace-wrapped at home. He continues to assert \"my heart is fine.\" I left him the  Living with HF booklet and weight calendars for his review. Patient has my contact information for any concerns/questions.   "

## 2024-05-27 NOTE — H&P
History Of Present Illness  Raheel Cloud is a 73 y.o. male presenting with chief complaint of heart palpitations and mild dyspnea.  Patient has a history of COPD currently on baseline 2 to 3 L of oxygen by nasal cannula.  He does endorse recent evaluation in this facility's emergency department for similar symptoms 48 hours prior.  He states that at that time he had spent the day overexerting himself, had temporarily been without his supplemental oxygen, but was able to convert back into normal sinus rhythm after a brief Cardizem infusion.  Patient was discharged home at that time.  He states symptoms are very similar today.  This includes onset of symptoms.  He states that on both instances, he felt as if he went into atrial fibrillation later in the evening right before bed.  He states this is typically when he would take his beta-blocker and therefore the longest possible duration in between therapy.  Upon arrival to this facility, patient found to be in atrial fibrillation with RVR.  Was started on Cardizem infusion with improved ventricular rate control but remains in atrial fibrillation.  Admitted for further management.     Past Medical History  Past Medical History:   Diagnosis Date    Personal history of other diseases of the respiratory system 06/23/2021    History of chronic obstructive lung disease       Surgical History  Past Surgical History:   Procedure Laterality Date    OTHER SURGICAL HISTORY  09/24/2020    Umbilical hernia repair laparoscopic    OTHER SURGICAL HISTORY  09/24/2020    Peritoneal adhesiolysis        Social History  He reports that he has been smoking cigarettes. He has never used smokeless tobacco. He reports that he does not currently use alcohol. He reports that he does not currently use drugs.    Family History  No family history on file.     Allergies  Patient has no known allergies.    Review of Systems   Respiratory:  Positive for shortness of breath.    Cardiovascular:   "Positive for leg swelling.        Physical Exam  Vitals reviewed.   Constitutional:       Appearance: Normal appearance.   HENT:      Head: Normocephalic and atraumatic.      Nose: Nose normal.      Mouth/Throat:      Mouth: Mucous membranes are moist.   Eyes:      Extraocular Movements: Extraocular movements intact.      Conjunctiva/sclera: Conjunctivae normal.      Pupils: Pupils are equal, round, and reactive to light.   Cardiovascular:      Rate and Rhythm: Normal rate. Rhythm irregular.      Pulses: Normal pulses.      Heart sounds: Normal heart sounds.   Pulmonary:      Effort: Pulmonary effort is normal. No respiratory distress.      Breath sounds: Normal breath sounds. No rhonchi or rales.   Abdominal:      General: Bowel sounds are normal.      Palpations: Abdomen is soft.   Musculoskeletal:         General: Normal range of motion.      Cervical back: Normal range of motion and neck supple.   Skin:     General: Skin is warm and dry.   Neurological:      General: No focal deficit present.      Mental Status: He is alert. Mental status is at baseline.   Psychiatric:         Mood and Affect: Mood normal.         Behavior: Behavior normal.          Last Recorded Vitals  Blood pressure 106/53, pulse 98, temperature 36.7 °C (98.1 °F), resp. rate (!) 24, height 1.778 m (5' 10\"), weight 68 kg (150 lb), SpO2 98%.    Relevant Results  Scheduled medications  albumin human, 25 g, intravenous, Once  budesonide, 1 mg, nebulization, BID  ipratropium-albuteroL, 3 mL, nebulization, 4 times per day  montelukast, 10 mg, oral, Nightly  oxygen, , inhalation, Continuous - Inhalation  pantoprazole, 40 mg, oral, Daily before breakfast   Or  pantoprazole, 40 mg, intravenous, Daily before breakfast  predniSONE, 30 mg, oral, Daily   Followed by  [START ON 6/3/2024] predniSONE, 20 mg, oral, Daily   Followed by  [START ON 6/10/2024] predniSONE, 10 mg, oral, Daily  rivaroxaban, 15 mg, oral, Daily with evening meal  rivaroxaban, 15 mg, " oral, Once  roflumilast, 500 mcg, oral, Daily      Continuous medications  dilTIAZem, 5-15 mg/hr, Last Rate: 5 mg/hr (05/27/24 0200)      PRN medications  PRN medications: acetaminophen, melatonin, [DISCONTINUED] ondansetron ODT **OR** ondansetron, polyethylene glycol  XR chest 1 view    Result Date: 5/27/2024  Interpreted By:  Mayda Jernigan, STUDY: XR CHEST 1 VIEW;  5/27/2024 1:08 am   INDICATION: Signs/Symptoms:Chest Pain.   COMPARISON: Radiographs of the chest dated 05/24/2024; CT of the chest dated 05/05/2024.   ACCESSION NUMBER(S): [id number]   ORDERING CLINICIAN: JENNIFER JARA   FINDINGS: AP radiograph of the chest was provided.       CARDIOMEDIASTINAL SILHOUETTE: Cardiomediastinal silhouette is normal in size and configuration.   LUNGS: Similar lung aeration to prior exam on 05/24/2024, without evidence of new consolidation or pleural effusion.   ABDOMEN: No remarkable upper abdominal findings.   BONES: No acute osseous changes.       1.  No evidence of acute cardiopulmonary process.       MACRO: None   Signed by: Mayda Jernigan 5/27/2024 1:31 AM Dictation workstation:   [id number]    ECG 12 lead    Result Date: 5/25/2024  Normal sinus rhythm Rightward axis Borderline ECG When compared with ECG of 24-MAY-2024 22:22, (unconfirmed) Sinus rhythm has replaced Atrial fibrillation    XR chest 1 view    Result Date: 5/24/2024  Interpreted By:  Mayda Jernigan, STUDY: XR CHEST 1 VIEW;  5/24/2024 10:44 pm   INDICATION: Signs/Symptoms:Chest Pain.   COMPARISON: CT of the chest dated 05/05/2024; radiographs of the chest dated 05/04/2024.   ACCESSION NUMBER(S): [id number]   ORDERING CLINICIAN: JENNIFER JARA   FINDINGS: AP radiograph of the chest was provided.       CARDIOMEDIASTINAL SILHOUETTE: Cardiomediastinal silhouette is normal in size and configuration.   LUNGS: Mild emphysematous changes are present in the upper lobes without evidence of consolidation or pleural effusion.   ABDOMEN: No  remarkable upper abdominal findings.   BONES: No acute osseous changes.       1.  Emphysematous changes in the upper lobes without evidence of consolidation or sizable pleural effusion.       MACRO: None   Signed by: Mayda Jernigan 5/24/2024 11:45 PM Dictation workstation:   [id number]    Electrocardiogram, 12-lead PRN ACS symptoms    Result Date: 5/22/2024  Sinus rhythm with short IN with Premature supraventricular complexes Junctional ST depression, probably normal Borderline ECG When compared with ECG of 05-MAY-2024 18:23, Premature supraventricular complexes are now Present    Electrocardiogram, 12-lead PRN ACS symptoms    Result Date: 5/8/2024  Normal sinus rhythm Normal ECG When compared with ECG of 04-MAY-2024 15:51, No significant change was found Confirmed by Av Servin ([id number]) on 5/8/2024 6:04:26 PM    Transthoracic Echo (TTE) Limited    Result Date: 5/7/2024            [facility] [address] [address]    Tel [phone] Fax [phone] TRANSTHORACIC ECHOCARDIOGRAM REPORT  Patient Name:     KENNA BRENNAN        Char Physician:   [id number] Paul Ibrahim MD Study Date:       5/6/2024             Ordering Provider:   [id number] KADIE LAWTON MRN/PID:          [id number]             Fellow: Accession#:       [id number]         Nurse: Date of           1950 / 73 years Sonographer:         Cielo Myers RDCS Birth/Age: Gender:           M                    Additional Staff: Height:           175.26 cm            Admit Date: Weight:           68.49 kg             Admission Status:    Inpatient - Routine BSA / BMI:        1.83 m2 / 22.30      Department Location: [facility]                   kg/m2 Blood Pressure: 132 /57 mmHg Study Type:    TRANSTHORACIC ECHO (TTE) LIMITED Diagnosis/ICD: Supraventricular tachycardia-I47.1 Indication:    Vtach CPT  Codes:     Echo Limited-96028  Study Detail: The following Echo studies were performed: 2D, Doppler and color               flow. Technically challenging study due to poor acoustic windows               and prominent lung artifact.  PHYSICIAN INTERPRETATION: Left Ventricle: Left ventricular systolic function is normal, with an estimated ejection fraction of 55%. Wall motion is abnormal. The left ventricular cavity size is normal. Spectral Doppler shows an impaired relaxation pattern of left ventricular diastolic filling. LV Wall Scoring: The basal and mid inferior septum and mid anteroseptal segment are hypokinetic. All remaining scored segments are normal. Left Atrium: The left atrium is normal in size. Right Ventricle: The right ventricle is normal in size. There is normal right ventricular global systolic function. Right Atrium: The right atrium is normal in size. Aortic Valve: The aortic valve is trileaflet. Aortic valve regurgitation was not assessed. Mitral Valve: The mitral valve is normal in structure. There is trace mitral valve regurgitation. Tricuspid Valve: The tricuspid valve is structurally normal. There is trace tricuspid regurgitation. Pulmonic Valve: The pulmonic valve is not well visualized. The pulmonic valve regurgitation was not assessed. Pericardium: There is a trivial pericardial effusion. Aorta: The aortic root is normal. Systemic Veins: The inferior vena cava appears to be of normal size. There is IVC inspiratory collapse greater than 50%.  CONCLUSIONS:  1. Left ventricular systolic function is normal with a 55% estimated ejection fraction.  2. Basal and mid inferior septum and mid anteroseptal segment are abnormal.  3. Spectral Doppler shows an impaired relaxation pattern of left ventricular diastolic filling. QUANTITATIVE DATA SUMMARY: 2D MEASUREMENTS:                           Normal Ranges: IVSd:          0.79 cm    (0.6-1.1cm) LVPWd:         1.15 cm    (0.6-1.1cm) LVIDd:         4.50 cm     (3.9-5.9cm) LV Mass Index: 119.8 g/m2 LV SYSTOLIC FUNCTION BY 2D PLANIMETRY (MOD):                     Normal Ranges: EF-A4C View: 52.9 % (>=55%) LV DIASTOLIC FUNCTION:                     Normal Ranges: MV Peak E: 0.83 m/s (0.7-1.2 m/s) MV Peak A: 1.16 m/s (0.42-0.7 m/s) E/A Ratio: 0.71     (1.0-2.2) MITRAL VALVE:                 Normal Ranges: MV DT: 134 msec (150-240msec)  [id number] Paul Ibrahim MD Electronically signed on 5/7/2024 at 8:59:49 AM  Wall Scoring  ** Final **     ECG 12 lead    Result Date: 5/5/2024  Normal sinus rhythm Rightward axis Borderline ECG When compared with ECG of 04-MAY-2024 14:34, (unconfirmed) No significant change was found Confirmed by Sedrick Myles ([id number]) on 5/5/2024 2:20:22 PM    ECG 12 lead    Result Date: 5/5/2024  Sinus rhythm with sinus arrhythmia Borderline Low voltage in frontal leads Otherwise normal ECG When compared with ECG of 20-MAY-2023 23:55, No significant change was found Confirmed by Sedrick Myles ([id number]) on 5/5/2024 2:19:50 PM    CT chest wo IV contrast    Result Date: 5/5/2024  Interpreted By:  Schoenberger, Joseph, STUDY: CT CHEST WO IV CONTRAST;  5/5/2024 8:53 am   INDICATION: Signs/Symptoms:Pneumonia vs viral bronchitis/AECOPD, hx of multiple pulmnary nodules.   COMPARISON: 05/21/2023   ACCESSION NUMBER(S): [id number]   ORDERING CLINICIAN: ROB CORREA   TECHNIQUE: Helical data acquisition of the chest was obtained  without IV contrast material.  Images were reformatted in axial, coronal, and sagittal planes.   FINDINGS: LUNGS AND AIRWAYS: The trachea and central airways are patent. No endobronchial lesion.   In the interval since the prior exam, there are multifocal peribronchovascular ground-glass opacities which are new from the prior exam. There are nonspecific though viral infectious etiology is consideration. There is no pleural effusion or pneumothorax or airspace consolidation. There are moderate 2 severe findings of some upper lung  predominant centrilobular emphysema. The bulb lung nodule in the periphery of the anterolateral lower right lower lobe is stable from the prior measuring 12 mm in longitudinal dimension.   MEDIASTINUM AND CAROLYN, LOWER NECK AND AXILLA: The visualized thyroid gland is within normal limits.   No evidence of thoracic lymphadenopathy by CT criteria.   Esophagus appears within normal limits as seen.   HEART AND VESSELS: The thoracic aorta is normal in course and caliber with mild to moderate atherosclerotic calcifications.   Main pulmonary artery and its branches are normal in caliber.   There are moderate coronary atherosclerotic calcifications. The study is not optimized for evaluation of coronary arteries.   There is no enlargement of cardiac chambers.   There is trace pericardial effusion.   UPPER ABDOMEN: The images of the upper abdomen are unremarkable except for right renal cysts also apparent previously.   CHEST WALL AND OSSEOUS STRUCTURES: There are no suspicious osseous lesions. Multilevel degenerative changes are present       1.  In interval development of of peribronchial vascular ground-glass infiltrates superimposed upon emphysematous changes as detailed above. See discussion above   MACRO: None   Signed by: Joseph Schoenberger 5/5/2024 1:43 PM Dictation workstation:   [id number]    XR chest 1 view    Result Date: 5/4/2024  Interpreted By:  Alli Castellanos, STUDY: XR CHEST 1 VIEW  5/4/2024 2:46 pm   INDICATION: Signs/Symptoms:Chest Pain   COMPARISON: 05/21/2023   ACCESSION NUMBER(S): [id number]   ORDERING CLINICIAN: BESS TIWARI   TECHNIQUE: 2 AP portable radiographs of the chest were obtained   FINDINGS: Multiple cardiac monitoring leads are seen over the chest.   No focal infiltrate, pleural effusion or pneumothorax is identified. The cardiac silhouette is within normal limits for size.       No focal infiltrate or pneumothorax is identified.   MACRO: None.   Signed by: Alli Castellanos 5/4/2024 3:17 PM  Dictation workstation:   [id number]   Results for orders placed or performed during the hospital encounter of 05/27/24 (from the past 24 hour(s))   CBC with Differential   Result Value Ref Range    WBC 9.9 4.4 - 11.3 x10*3/uL    nRBC 0.0 0.0 - 0.0 /100 WBCs    RBC 3.62 (L) 4.50 - 5.90 x10*6/uL    Hemoglobin 10.5 (L) 13.5 - 17.5 g/dL    Hematocrit 34.7 (L) 41.0 - 52.0 %    MCV 96 80 - 100 fL    MCH 29.0 26.0 - 34.0 pg    MCHC 30.3 (L) 32.0 - 36.0 g/dL    RDW 14.9 (H) 11.5 - 14.5 %    Platelets 165 150 - 450 x10*3/uL    Neutrophils % 93.9 40.0 - 80.0 %    Immature Granulocytes %, Automated 1.3 (H) 0.0 - 0.9 %    Lymphocytes % 2.0 13.0 - 44.0 %    Monocytes % 2.8 2.0 - 10.0 %    Eosinophils % 0.0 0.0 - 6.0 %    Basophils % 0.0 0.0 - 2.0 %    Neutrophils Absolute 9.30 (H) 1.60 - 5.50 x10*3/uL    Immature Granulocytes Absolute, Automated 0.13 0.00 - 0.50 x10*3/uL    Lymphocytes Absolute 0.20 (L) 0.80 - 3.00 x10*3/uL    Monocytes Absolute 0.28 0.05 - 0.80 x10*3/uL    Eosinophils Absolute 0.00 0.00 - 0.40 x10*3/uL    Basophils Absolute 0.00 0.00 - 0.10 x10*3/uL   Comprehensive Metabolic Panel   Result Value Ref Range    Glucose 194 (H) 74 - 99 mg/dL    Sodium 144 136 - 145 mmol/L    Potassium 3.7 3.5 - 5.3 mmol/L    Chloride 109 (H) 98 - 107 mmol/L    Bicarbonate 28 21 - 32 mmol/L    Anion Gap 11 10 - 20 mmol/L    Urea Nitrogen 18 6 - 23 mg/dL    Creatinine 0.72 0.50 - 1.30 mg/dL    eGFR >90 >60 mL/min/1.73m*2    Calcium 7.4 (L) 8.6 - 10.3 mg/dL    Albumin 3.2 (L) 3.4 - 5.0 g/dL    Alkaline Phosphatase 41 33 - 136 U/L    Total Protein 5.2 (L) 6.4 - 8.2 g/dL    AST 11 9 - 39 U/L    Bilirubin, Total 0.5 0.0 - 1.2 mg/dL    ALT 27 10 - 52 U/L   Brain Natriuretic Peptide   Result Value Ref Range     (H) 0 - 99 pg/mL   Protime-INR   Result Value Ref Range    Protime 11.0 9.8 - 12.8 seconds    INR 1.0 0.9 - 1.1   Magnesium   Result Value Ref Range    Magnesium 1.93 1.60 - 2.40 mg/dL   Troponin I, High Sensitivity,  Initial   Result Value Ref Range    Troponin I, High Sensitivity 5 0 - 20 ng/L   Troponin, High Sensitivity, 1 Hour   Result Value Ref Range    Troponin I, High Sensitivity 7 0 - 20 ng/L   Light Blue Top   Result Value Ref Range    Extra Tube Hold for add-ons.    CBC   Result Value Ref Range    WBC 11.7 (H) 4.4 - 11.3 x10*3/uL    nRBC 0.0 0.0 - 0.0 /100 WBCs    RBC 4.23 (L) 4.50 - 5.90 x10*6/uL    Hemoglobin 12.2 (L) 13.5 - 17.5 g/dL    Hematocrit 39.7 (L) 41.0 - 52.0 %    MCV 94 80 - 100 fL    MCH 28.8 26.0 - 34.0 pg    MCHC 30.7 (L) 32.0 - 36.0 g/dL    RDW 15.0 (H) 11.5 - 14.5 %    Platelets 208 150 - 450 x10*3/uL   Comprehensive metabolic panel   Result Value Ref Range    Glucose 116 (H) 74 - 99 mg/dL    Sodium 143 136 - 145 mmol/L    Potassium 4.7 3.5 - 5.3 mmol/L    Chloride 105 98 - 107 mmol/L    Bicarbonate 30 21 - 32 mmol/L    Anion Gap 13 10 - 20 mmol/L    Urea Nitrogen 19 6 - 23 mg/dL    Creatinine 0.78 0.50 - 1.30 mg/dL    eGFR >90 >60 mL/min/1.73m*2    Calcium 8.6 8.6 - 10.3 mg/dL    Albumin 3.6 3.4 - 5.0 g/dL    Alkaline Phosphatase 44 33 - 136 U/L    Total Protein 5.6 (L) 6.4 - 8.2 g/dL    AST 12 9 - 39 U/L    Bilirubin, Total 0.5 0.0 - 1.2 mg/dL    ALT 30 10 - 52 U/L          Assessment/Plan   Principal Problem:    Atrial fibrillation with RVR (Multi)  Active Problems:    Acute on chronic hypoxic respiratory failure (Multi)    Chronic obstructive pulmonary disease (Multi)    Paroxysmal atrial fibrillation (Multi)    Hyperglycemia      Patient presents to this facility in atrial fibrillation with rapid ventricular response.  He states that his symptoms first became noticeable earlier this evening as he was attempting to get into bed.  He states that he appreciated an abnormal, tickling sensation in his throat followed by persistent palpitations.    Patient is noted to be on low-dose Bystolic at home which he takes nightly.  Patient himself expressed concern that the efficacy of this medication has  waned too much by evening placing him at a higher risk for these events, both of which have occurred later in the evening.  He is questioning whether or not his dose should be increased, changed to a twice daily agent, or consider an alternative therapy or therapeutic approach such as pill in the pocket.  Consultation has been placed to EP service for recommendations.  Greatly appreciate their assistance.    Of note, patient also completing therapy of what was described as a moderate to severe COPD exacerbation and remains on a prednisone taper.  As discussed with patient, the use of steroids may transiently increase his risk of these events occurring however the associated hypoxia from undertreated COPD events carries that same risk.  Patient expressed understanding and we will continue with his current steroid taper at this time.  30 mg daily has been ordered.    Home medication regiment has been discussed with patient and ordered per patient preference.  Every 6 hour DuoNebs timed specifically at home regiment.  Patient's Pulmicort has been continued as well as his montelukast.  As needed DuoNebs has been made available for breakthrough symptoms.    In discussion with patient, on the day of admission he did not take his Xarelto.  Original plan was to defer the days of therapy and either treat with adjuvant aspirin or provide bridging therapy with a single dose of therapeutic Lovenox.  This would theoretically provide 12 hours of therapeutic bridging coverage however patient declined.  He states that his preference is to take the missed dose at this time.  I did explain this carries an increased risk of bleeding given the overlap which patient recognizes.  Given that there are no direct contraindications to the Xarelto dose even though it will be within 12 hours of his neck scheduled dose, a single dose has been ordered at this time.    Maintain telemetry    Cardiac/low-salt diet    Discussed and patient declines  PT/OT assessment     I spent >75 minutes in the professional and overall care of this patient.      Munir Mattson, DO

## 2024-05-27 NOTE — ED PROVIDER NOTES
"HPI   Chief Complaint   Patient presents with    Shortness of Breath     Pt states approx 1 hour ago he felt himself \"go into afib and felt it in the back of my throat\"; pt endorsing SOB, denies CP       HPI  73-year-old male with past medical history of COPD, A-fib presented to ED for any feeling at the back of his throat at 11:30 PM, associated with shortness of breath associated with dizziness, no associated chest pain.  ROS: positive for lower limb swelling bilaterally.   He was seen in the ED on 24 for similar symptoms.  Son stated that his mom  4 weeks ago and it has been stressful   He is on nebivolol and Xarelto for Afib.  He is currently on prednisone for COPD, has been on 50 mg for the past 3 days                  Lyndsey Coma Scale Score: 15                     Patient History   Past Medical History:   Diagnosis Date    Personal history of other diseases of the respiratory system 2021    History of chronic obstructive lung disease     Past Surgical History:   Procedure Laterality Date    OTHER SURGICAL HISTORY  2020    Umbilical hernia repair laparoscopic    OTHER SURGICAL HISTORY  2020    Peritoneal adhesiolysis     No family history on file.  Social History     Tobacco Use    Smoking status: Every Day     Current packs/day: 2.00     Types: Cigarettes    Smokeless tobacco: Never   Vaping Use    Vaping status: Never Used   Substance Use Topics    Alcohol use: Not Currently    Drug use: Not Currently       Physical Exam   ED Triage Vitals [24 0023]   Temperature Heart Rate Respirations BP   36.7 °C (98.1 °F) (!) 149 (!) 24 132/73      Pulse Ox Temp src Heart Rate Source Patient Position   94 % -- -- --      BP Location FiO2 (%)     -- --       Physical Exam  General: Awake, alert/oriented x4, in mild resp distress, not in pain  Head: Atraumatic/Normocephalic  Eyes: Normal external exam, EOMI, PERRLA  ENT: Oropharynx normal. Uvula midline, no tonsillar edema, moist mucous " membranes  Cardiovascular: irregular heart rhythm, tachycardic -150 no murmurs, rubs, or gallops, radial pulses +2, +3 bilateral lower limb edema  Pulmonary: decreased chest expansion bilaterally, exp wheezes  Abdomen: +BS, soft, non-tender, nondistended, no guarding or rebound, no masses noted  MSK: No joint swelling, normal movements of all extremities. Range of motion- normal.  Extremities: FROM, wounds, or contusions  Skin- No lesions, contusions, or erythema.  Neuro: Alert/oriented x4, no focal motor or sensory deficits  Psychiatric: Judgment intact. Appropriate mood and behavior     ED Course & MDM   Diagnoses as of 05/27/24 0532   Atrial fibrillation with RVR (Multi)     Results for orders placed or performed during the hospital encounter of 05/27/24 (from the past 24 hour(s))   CBC with Differential   Result Value Ref Range    WBC 9.9 4.4 - 11.3 x10*3/uL    nRBC 0.0 0.0 - 0.0 /100 WBCs    RBC 3.62 (L) 4.50 - 5.90 x10*6/uL    Hemoglobin 10.5 (L) 13.5 - 17.5 g/dL    Hematocrit 34.7 (L) 41.0 - 52.0 %    MCV 96 80 - 100 fL    MCH 29.0 26.0 - 34.0 pg    MCHC 30.3 (L) 32.0 - 36.0 g/dL    RDW 14.9 (H) 11.5 - 14.5 %    Platelets 165 150 - 450 x10*3/uL    Neutrophils % 93.9 40.0 - 80.0 %    Immature Granulocytes %, Automated 1.3 (H) 0.0 - 0.9 %    Lymphocytes % 2.0 13.0 - 44.0 %    Monocytes % 2.8 2.0 - 10.0 %    Eosinophils % 0.0 0.0 - 6.0 %    Basophils % 0.0 0.0 - 2.0 %    Neutrophils Absolute 9.30 (H) 1.60 - 5.50 x10*3/uL    Immature Granulocytes Absolute, Automated 0.13 0.00 - 0.50 x10*3/uL    Lymphocytes Absolute 0.20 (L) 0.80 - 3.00 x10*3/uL    Monocytes Absolute 0.28 0.05 - 0.80 x10*3/uL    Eosinophils Absolute 0.00 0.00 - 0.40 x10*3/uL    Basophils Absolute 0.00 0.00 - 0.10 x10*3/uL   Comprehensive Metabolic Panel   Result Value Ref Range    Glucose 194 (H) 74 - 99 mg/dL    Sodium 144 136 - 145 mmol/L    Potassium 3.7 3.5 - 5.3 mmol/L    Chloride 109 (H) 98 - 107 mmol/L    Bicarbonate 28 21 - 32 mmol/L     Anion Gap 11 10 - 20 mmol/L    Urea Nitrogen 18 6 - 23 mg/dL    Creatinine 0.72 0.50 - 1.30 mg/dL    eGFR >90 >60 mL/min/1.73m*2    Calcium 7.4 (L) 8.6 - 10.3 mg/dL    Albumin 3.2 (L) 3.4 - 5.0 g/dL    Alkaline Phosphatase 41 33 - 136 U/L    Total Protein 5.2 (L) 6.4 - 8.2 g/dL    AST 11 9 - 39 U/L    Bilirubin, Total 0.5 0.0 - 1.2 mg/dL    ALT 27 10 - 52 U/L   Brain Natriuretic Peptide   Result Value Ref Range     (H) 0 - 99 pg/mL   Protime-INR   Result Value Ref Range    Protime 11.0 9.8 - 12.8 seconds    INR 1.0 0.9 - 1.1   Magnesium   Result Value Ref Range    Magnesium 1.93 1.60 - 2.40 mg/dL   Troponin I, High Sensitivity, Initial   Result Value Ref Range    Troponin I, High Sensitivity 5 0 - 20 ng/L   Troponin, High Sensitivity, 1 Hour   Result Value Ref Range    Troponin I, High Sensitivity 7 0 - 20 ng/L   Light Blue Top   Result Value Ref Range    Extra Tube Hold for add-ons.    CBC   Result Value Ref Range    WBC 11.7 (H) 4.4 - 11.3 x10*3/uL    nRBC 0.0 0.0 - 0.0 /100 WBCs    RBC 4.23 (L) 4.50 - 5.90 x10*6/uL    Hemoglobin 12.2 (L) 13.5 - 17.5 g/dL    Hematocrit 39.7 (L) 41.0 - 52.0 %    MCV 94 80 - 100 fL    MCH 28.8 26.0 - 34.0 pg    MCHC 30.7 (L) 32.0 - 36.0 g/dL    RDW 15.0 (H) 11.5 - 14.5 %    Platelets 208 150 - 450 x10*3/uL      Medical Decision Making    73-year-old man with past medical history of COPD A-fib presented with funny feeling at the back of his throat that started 1 hour prior to arrival(11:30), associated with shortness of breath.    Upon arrival to ED heart rate is 1 40-1 50 and is irregular,  he endorses SOB, but no chest pain or dizziness, physical exam is notable for wheezes and decreased chest expansion, and lower extremity edema.   Labs were done and showed WBC of 9.9, hemoglobin 10.5, platelets of 65 sodium 145, potassium 3.7, creatinine 0.72, EKG showed: Afib with RVR, troponins negative external, BNP is 107 he was started on Cardizem drip was given a breathing  treatment patient remained in A-fib, we will be admitted to CCU for further evaluation and management  60 discussed with ED physician  Procedure  Critical Care    Performed by: Zachary Lake DO  Authorized by: Zachary Lake DO    Critical care provider statement:     Critical care time (minutes):  35    Critical care time was exclusive of:  Separately billable procedures and treating other patients and teaching time    Critical care was necessary to treat or prevent imminent or life-threatening deterioration of the following conditions:  Circulatory failure (Atrial fibrillation with RVR)    Critical care was time spent personally by me on the following activities:  Development of treatment plan with patient or surrogate, discussions with primary provider, evaluation of patient's response to treatment, examination of patient, ordering and performing treatments and interventions, ordering and review of laboratory studies, ordering and review of radiographic studies and re-evaluation of patient's condition       Charlotte Perkins MD  Resident  05/27/24 8784    The patient was seen by the resident/fellow.  I have personally performed a substantive portion of the encounter.  I have seen and examined the patient; agree with the workup, evaluation, MDM, management and diagnosis.  The care plan has been discussed with the resident; I have reviewed the resident’s note and agree with the documented findings.       Zachary Lake DO  05/29/24 0712

## 2024-05-27 NOTE — DISCHARGE INSTR - AVS FIRST PAGE
A-Fib/Diastolic Heart Failure Discharge Instructions  1. Weigh yourself daily and record on your weight calendars. Keep BLE ace-wrapped and elevate BLE at rest.  2. If you gain more than 2 or 3 pounds overnight or 5 pounds in 5 days, call your cardiologist.  3. Follow a low sodium diet. No more than 2000 mg in one day, or more than 700 mg per meal. Increase protein.  4. Limit total fluids to no more than 8 cups (or 2 liters) per day - this includes all fluids (water, coffee, juice, milk, tea, etc.)  5. Monitor your blood pressure and heart rate in the morning, then take your meds, than check blood pressure and heart rate a few hours later and record on your calendars. If heart rate elevates and stays sustained, please call either Dr. Ibrahim or Dr. Myles.  6. Be sure to schedule follow-up with Dr. Myles for 2 weeks and keep Bre appointment with Dr. Ibrahim.  7. Keep your follow-up appointments! Bring your weight calendars with you so the doctors can see your weight trend and blood pressure/heart rate readings.  8. Be sure to  any new prescriptions and take them as directed. If unsure of the medications, be sure to call your cardiologist.  9. Stay as active as you can tolerate. Gently increase as tolerated.  10. If you notice subtle change of symptoms (slight increase in swelling, slight shortness of breath, a new intolerance to lying flat, a new cough), be sure to call your cardiologist.  11. If you have any questions or concerns or you have not heard back from the cardiologist, feel free to call Shelly Cornelius, heart failure navigator at [phone].

## 2024-05-27 NOTE — CONSULTS
Consults      J44.9 COPD stage IV  I48.91 Recurrent paroxysmal atrial fibrillation twice in the past 48 hours given IV Cardizem    As above    1 increase nebivolol to twice daily  2.  Get the coronary CT angiogram he is in sinus rhythm still shows some PACs  3.  If the CT angiogram is negative recurrent A-fib could be treated with flecainide if CT angiogram is positive for coronary disease then trial dofetilide  For.  If he continues to have atrial fibrillation despite an antiarrhythmic drug consider ablation    I believe he can be discharged today    History Of Present Illness:    Raheel Cloud is a 73 y.o. male presenting with COPD presenting to the ER 2 days ago.  Was given Cardizem infusion converting to sinus rhythm and was discharged.  Presented with similar symptoms again today and again given Cardizem but did not convert.  He later did convert today to sinus rhythm with frequent PACs and is off the Cardizem.  At home he takes Xarelto and nebivolol he is on prednisone for his COPD.  He was seen May 7 by Dr. Jake MOTA who noted he has stage IV COPD bronchitis bronchiectasis paroxysmal atrial fibrillation on Xarelto.    He is a retired physician with advanced lung disease, chronic hypoxic respiratory failure.  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 Valladares    Today the discussion I had with the patient he still smokes half a pack a day has smoked over 40 years.  He goes into A-fib going to bed at night but does not watch for bradycardia input he wonders to know if increasing Bystolic would help prevent more episodes of A-fib.  He asked my opinion about getting a coronary CT angiogram which Dr. Ibrahim had mentioned I told him it would be extremely helpful to get the coronary CT angiogram because if he has recurrent A-fib and has no coronary disease then flecainide  would be an excellent option if he has coronary disease then Tikosyn or ablation would be the alternative options from my standpoint he can be discharged and see Dr. Myles as an outpatient    Echocardiogram 1/10/2023: EF 60 to 65%.  Trace MR and trace to mild TR.     Treadmill stress echocardiogram 10/4/2022: Exercised for 2 minutes and 10 seconds on a standard Jose protocol achieving 95% of the age-predicted maximal heart rate.  EF 60% at baseline increasing to 80% at peak stress.    Echocardiogram 1/10/2023: EF 60 to 65%.  Trace MR and trace to mild TR.     Treadmill stress echocardiogram 10/4/2022: Exercised for 2 minutes and 10 seconds on a standard Jose protocol achieving 95% of the age-predicted maximal heart rate.  EF 60% at baseline increasing to 80% at peak stress.    I agree with current management is QT C was 400 dofetilide is a consideration given his advanced lung disease not ideal candidate for PVI ablation amiodarone and sotalol contraindicated coronary status uncertain would not use flecainide without better knowledge of his coronary status.  EP was consulted but unavailable today on the holiday    As above    1 increase nebivolol to twice daily  2.  Get the coronary CT angiogram he is in sinus rhythm still shows some PACs  3.  If the CT angiogram is negative recurrent A-fib could be treated with flecainide if CT angiogram is positive for coronary disease then trial dofetilide  For.  If he continues to have atrial fibrillation despite an antiarrhythmic drug consider ablation    I believe he can be discharged today             Past Medical History:  He has a past medical history of Personal history of other diseases of the respiratory system (06/23/2021).     Past Surgical History:  He has a past surgical history that includes Other surgical history (09/24/2020) and Other surgical history (09/24/2020).      Social History:  He reports that he has been smoking cigarettes. He has been smoking an  average of 2 packs per day. He has never used smokeless tobacco. He reports that he does not currently use alcohol. He reports that he does not currently use drugs.     Family History:  Family History[]Expand by Default   No family history on file.        Allergies:  Patient has no known allergies.         Past Medical History:  He has a past medical history of Personal history of other diseases of the respiratory system (06/23/2021).     Past Surgical History:  He has a past surgical history that includes Other surgical history (09/24/2020) and Other surgical history (09/24/2020).      Social History:  He reports that he has been smoking cigarettes. He has been smoking an average of 2 packs per day. He has never used smokeless tobacco. He reports that he does not currently use alcohol. He reports that he does not currently use drugs.     Family History:  Family History[]Expand by Default   No family history on file.        Allergies:  Patient has no known allergies.     Last Recorded Vitals:  Vitals:    05/27/24 0800 05/27/24 0900 05/27/24 1000 05/27/24 1200   BP: 99/54 108/56 105/52 116/51   BP Location:       Patient Position: Lying      Pulse: 80 92 92 92   Resp:       Temp: 36.1 °C (97 °F)   36.1 °C (97 °F)   TempSrc: Temporal   Temporal   SpO2: 98% 97% 100% 97%   Weight:       Height:           Last Labs:  CBC - 5/27/2024:  5:04 AM  11.7 12.2 208    39.7      CMP - 5/27/2024:  5:04 AM  8.6 5.6 12 --- 0.5   3.5 3.6 30 44      PTT - 5/4/2024:  2:33 PM  1.0   11.0 36     Troponin I, High Sensitivity   Date/Time Value Ref Range Status   05/27/2024 01:41 AM 7 0 - 20 ng/L Final   05/27/2024 12:36 AM 5 0 - 20 ng/L Final   05/25/2024 12:36 AM 9 0 - 20 ng/L Final     BNP   Date/Time Value Ref Range Status   05/27/2024 12:36  (H) 0 - 99 pg/mL Final   05/24/2024 10:29  (H) 0 - 99 pg/mL Final     Hemoglobin A1C   Date/Time Value Ref Range Status   04/17/2024 11:30 AM 5.7 (H) see below % Final     LDL  "Calculated   Date/Time Value Ref Range Status   04/17/2024 11:30 AM 95 <=99 mg/dL Final     Comment:                                 Near   Borderline      AGE      Desirable  Optimal    High     High     Very High     0-19 Y     0 - 109     ---    110-129   >/= 130     ----    20-24 Y     0 - 119     ---    120-159   >/= 160     ----      >24 Y     0 -  99   100-129  130-159   160-189     >/=190       VLDL   Date/Time Value Ref Range Status   04/17/2024 11:30 AM 24 0 - 40 mg/dL Final   11/17/2022 12:15 PM 18 0 - 40 mg/dL Final   04/29/2022 03:37 PM 26 0 - 40 mg/dL Final   06/18/2021 04:57 PM 34 0 - 40 mg/dL Final      Last I/O:  I/O last 3 completed shifts:  In: - (0 mL/kg)   Out: 300 (4.4 mL/kg) [Urine:300 (0.1 mL/kg/hr)]  Weight: 68.2 kg     Past Cardiology Tests (Last 3 Years):  EKG:  ECG 12 lead 05/24/2024 (Preliminary)      Electrocardiogram, 12-lead PRN ACS symptoms 05/10/2024 (Preliminary)      Electrocardiogram, 12-lead PRN ACS symptoms 05/05/2024      ECG 12 lead 05/04/2024      ECG 12 lead 05/04/2024    Echo:  Transthoracic Echo (TTE) Limited 05/06/2024    Ejection Fractions:  No results found for: \"EF\"  Cath:  No results found for this or any previous visit from the past 1095 days.    Stress Test:  No results found for this or any previous visit from the past 1095 days.    Cardiac Imaging:  No results found for this or any previous visit from the past 1095 days.      Past Medical History:  He has a past medical history of Personal history of other diseases of the respiratory system (06/23/2021).    Past Surgical History:  He has a past surgical history that includes Other surgical history (09/24/2020) and Other surgical history (09/24/2020).      Social History:  He reports that he has been smoking cigarettes. He has never used smokeless tobacco. He reports that he does not currently use alcohol. He reports that he does not currently use drugs.    Family History:  No family history on file.   "   Allergies:  Patient has no known allergies.    Inpatient Medications:  Scheduled medications   Medication Dose Route Frequency    budesonide  1 mg nebulization BID    fluticasone  2 spray Each Nostril BID    ipratropium-albuteroL  3 mL nebulization 4 times per day    montelukast  10 mg oral Nightly    nebivolol  2.5 mg oral Nightly    oxygen   inhalation Continuous - Inhalation    predniSONE  30 mg oral Daily    Followed by    [START ON 6/3/2024] predniSONE  20 mg oral Daily    Followed by    [START ON 6/10/2024] predniSONE  10 mg oral Daily    rivaroxaban  15 mg oral Daily with evening meal    roflumilast  500 mcg oral Daily    tamsulosin  0.4 mg oral Nightly     PRN medications   Medication    acetaminophen    ammonium lactate    melatonin    ondansetron    polyethylene glycol     Continuous Medications   Medication Dose Last Rate    dilTIAZem  5-15 mg/hr Stopped (05/27/24 0723)    oxygen  3 L/min       Outpatient Medications:  Current Outpatient Medications   Medication Instructions    ammonium lactate (Amlactin) 12 % cream 1 Application, Topical, 2 times daily PRN    azithromycin (ZITHROMAX) 250 mg, oral, Every other day    budesonide (PULMICORT) 1 mg, nebulization, 2 times daily RT, Rinse mouth with water after use to reduce aftertaste and incidence of candidiasis. Do not swallow.    Flomax 0.4 mg, oral, Daily    fluticasone (Flonase) 50 mcg/actuation nasal spray 2 sprays, Each Nostril, 2 times daily, Shake gently. Before first use, prime pump. After use, clean tip and replace cap.    ipratropium-albuteroL (Combivent Respimat)  mcg/actuation inhaler 1 puff, inhalation, Every 4 hours PRN    ipratropium-albuteroL (Duo-Neb) 0.5-2.5 mg/3 mL nebulizer solution 3 mL, inhalation, 4 times daily RT    montelukast (SINGULAIR) 10 mg, oral, Nightly    nebivolol (BYSTOLIC) 2.5 mg, oral, Daily    ondansetron (ZOFRAN) 8 mg, oral, Every 8 hours PRN    oxygen (O2) 3 L/min, inhalation, Continuous    predniSONE  (Deltasone) 10 mg tablet Take 4 tablets (40 mg) by mouth once daily for 7 days, THEN 3 tablets (30 mg) once daily for 7 days, THEN 2 tablets (20 mg) once daily for 7 days, THEN 1 tablet (10 mg) once daily for 7 days.    rivaroxaban (XARELTO) 15 mg, oral, Daily with evening meal, Take with food.    roflumilast (DALIRESP) 500 mcg, oral, Daily    triamcinolone (Kenalog) 0.1 % ointment 1 Application, Topical, 2 times daily PRN    Ventolin HFA 90 mcg/actuation inhaler 2 puffs, inhalation, Every 4 hours PRN     Results for orders placed or performed during the hospital encounter of 05/27/24 (from the past 96 hour(s))   CBC with Differential   Result Value Ref Range    WBC 9.9 4.4 - 11.3 x10*3/uL    nRBC 0.0 0.0 - 0.0 /100 WBCs    RBC 3.62 (L) 4.50 - 5.90 x10*6/uL    Hemoglobin 10.5 (L) 13.5 - 17.5 g/dL    Hematocrit 34.7 (L) 41.0 - 52.0 %    MCV 96 80 - 100 fL    MCH 29.0 26.0 - 34.0 pg    MCHC 30.3 (L) 32.0 - 36.0 g/dL    RDW 14.9 (H) 11.5 - 14.5 %    Platelets 165 150 - 450 x10*3/uL    Neutrophils % 93.9 40.0 - 80.0 %    Immature Granulocytes %, Automated 1.3 (H) 0.0 - 0.9 %    Lymphocytes % 2.0 13.0 - 44.0 %    Monocytes % 2.8 2.0 - 10.0 %    Eosinophils % 0.0 0.0 - 6.0 %    Basophils % 0.0 0.0 - 2.0 %    Neutrophils Absolute 9.30 (H) 1.60 - 5.50 x10*3/uL    Immature Granulocytes Absolute, Automated 0.13 0.00 - 0.50 x10*3/uL    Lymphocytes Absolute 0.20 (L) 0.80 - 3.00 x10*3/uL    Monocytes Absolute 0.28 0.05 - 0.80 x10*3/uL    Eosinophils Absolute 0.00 0.00 - 0.40 x10*3/uL    Basophils Absolute 0.00 0.00 - 0.10 x10*3/uL   Comprehensive Metabolic Panel   Result Value Ref Range    Glucose 194 (H) 74 - 99 mg/dL    Sodium 144 136 - 145 mmol/L    Potassium 3.7 3.5 - 5.3 mmol/L    Chloride 109 (H) 98 - 107 mmol/L    Bicarbonate 28 21 - 32 mmol/L    Anion Gap 11 10 - 20 mmol/L    Urea Nitrogen 18 6 - 23 mg/dL    Creatinine 0.72 0.50 - 1.30 mg/dL    eGFR >90 >60 mL/min/1.73m*2    Calcium 7.4 (L) 8.6 - 10.3 mg/dL    Albumin  3.2 (L) 3.4 - 5.0 g/dL    Alkaline Phosphatase 41 33 - 136 U/L    Total Protein 5.2 (L) 6.4 - 8.2 g/dL    AST 11 9 - 39 U/L    Bilirubin, Total 0.5 0.0 - 1.2 mg/dL    ALT 27 10 - 52 U/L   Brain Natriuretic Peptide   Result Value Ref Range     (H) 0 - 99 pg/mL   Protime-INR   Result Value Ref Range    Protime 11.0 9.8 - 12.8 seconds    INR 1.0 0.9 - 1.1   Magnesium   Result Value Ref Range    Magnesium 1.93 1.60 - 2.40 mg/dL   Troponin I, High Sensitivity, Initial   Result Value Ref Range    Troponin I, High Sensitivity 5 0 - 20 ng/L   Troponin, High Sensitivity, 1 Hour   Result Value Ref Range    Troponin I, High Sensitivity 7 0 - 20 ng/L   Light Blue Top   Result Value Ref Range    Extra Tube Hold for add-ons.    CBC   Result Value Ref Range    WBC 11.7 (H) 4.4 - 11.3 x10*3/uL    nRBC 0.0 0.0 - 0.0 /100 WBCs    RBC 4.23 (L) 4.50 - 5.90 x10*6/uL    Hemoglobin 12.2 (L) 13.5 - 17.5 g/dL    Hematocrit 39.7 (L) 41.0 - 52.0 %    MCV 94 80 - 100 fL    MCH 28.8 26.0 - 34.0 pg    MCHC 30.7 (L) 32.0 - 36.0 g/dL    RDW 15.0 (H) 11.5 - 14.5 %    Platelets 208 150 - 450 x10*3/uL   Comprehensive metabolic panel   Result Value Ref Range    Glucose 116 (H) 74 - 99 mg/dL    Sodium 143 136 - 145 mmol/L    Potassium 4.7 3.5 - 5.3 mmol/L    Chloride 105 98 - 107 mmol/L    Bicarbonate 30 21 - 32 mmol/L    Anion Gap 13 10 - 20 mmol/L    Urea Nitrogen 19 6 - 23 mg/dL    Creatinine 0.78 0.50 - 1.30 mg/dL    eGFR >90 >60 mL/min/1.73m*2    Calcium 8.6 8.6 - 10.3 mg/dL    Albumin 3.6 3.4 - 5.0 g/dL    Alkaline Phosphatase 44 33 - 136 U/L    Total Protein 5.6 (L) 6.4 - 8.2 g/dL    AST 12 9 - 39 U/L    Bilirubin, Total 0.5 0.0 - 1.2 mg/dL    ALT 30 10 - 52 U/L       Physical Exam:  Subjective:   Examination:   General Examination:   General Appearance: Well developed, well nourished, in no acute distress.   Head: normocephalic, atraumatic   Eyes: Anicteric sclera. Pupils are equally round and reactive to light.  Extraocular movements  are intact.    Ears: normal   Oral: Cavity: mucosa moist.   Throat: clear.   Neck/Thyroid: neck supple, full range of motion, no cervical lymphadenopathy.   Skin: warm and dry, no suspicious lesions.    Heart: Regular rate and rhythm, S1, S2 normal, no murmurs.   Lungs: Chronic COPD  Abdomen: soft, non-tender, non-distended, bowl sound present, normal.   Extremities: no clubbing, no cyanosis, no edema.   Neuro: non-focal, motor strength normal upper lower extremities, sensory exam intact.       Assessment/Plan     Echocardiogram 1/10/2023: EF 60 to 65%.  Trace MR and trace to mild TR.     Treadmill stress echocardiogram 10/4/2022: Exercised for 2 minutes and 10 seconds on a standard Joes protocol achieving 95% of the age-predicted maximal heart rate.  EF 60% at baseline increasing to 80% at peak stress.     Past Medical History:  He has a past medical history of Personal history of other diseases of the respiratory system (06/23/2021).     Past Surgical History:  He has a past surgical history that includes Other surgical history (09/24/2020) and Other surgical history (09/24/2020).      Social History:  He reports that he has been smoking cigarettes. He has been smoking an average of 2 packs per day. He has never used smokeless tobacco. He reports that he does not currently use alcohol. He reports that he does not currently use drugs.     Family History:  Family History[]Expand by Default   No family history on file.        Allergies:  Patient has no known allergies.    Code Status:  Full Code    I spent 35 minutes in the professional and overall care of this patient.        Zachary Randolph MD

## 2024-05-27 NOTE — DISCHARGE SUMMARY
Discharge Diagnosis  Atrial fibrillation with RVR (Multi)    Issues Requiring Follow-Up  Follow up with Dr. Myles and Dr. Ibrahim    Discharge Meds     Your medication list        START taking these medications        Instructions Last Dose Given Next Dose Due   magnesium oxide 400 mg tablet  Commonly known as: Mag-Ox      Take 1 tablet (400 mg) by mouth once daily.       potassium chloride CR 20 mEq ER tablet  Commonly known as: Klor-Con M20      Take 1 tablet (20 mEq) by mouth once daily. Do not crush or chew.              CHANGE how you take these medications        Instructions Last Dose Given Next Dose Due   Flomax 0.4 mg 24 hr capsule  Generic drug: tamsulosin  What changed: when to take this      Take 1 capsule (0.4 mg) by mouth once daily.       nebivolol 2.5 mg tablet  Commonly known as: Bystolic  What changed:   how much to take  when to take this      Take 2 tablets (5 mg) by mouth once daily at bedtime.              CONTINUE taking these medications        Instructions Last Dose Given Next Dose Due   ammonium lactate 12 % cream  Commonly known as: Amlactin           budesonide 1 mg/2 mL nebulizer solution  Commonly known as: Pulmicort           fluticasone 50 mcg/actuation nasal spray  Commonly known as: Flonase      Administer 2 sprays into each nostril 2 times a day. Shake gently. Before first use, prime pump. After use, clean tip and replace cap.       Combivent Respimat  mcg/actuation inhaler  Generic drug: ipratropium-albuteroL           ipratropium-albuteroL 0.5-2.5 mg/3 mL nebulizer solution  Commonly known as: Duo-Neb           montelukast 10 mg tablet  Commonly known as: Singulair      Take 1 tablet (10 mg) by mouth once daily at bedtime.       ondansetron 8 mg tablet  Commonly known as: Zofran      Take 1 tablet (8 mg) by mouth every 8 hours if needed for nausea or vomiting.       oxygen gas therapy  Commonly known as: O2           predniSONE 10 mg tablet  Commonly known as:  Deltasone  Start taking on: May 20, 2024      Take 4 tablets (40 mg) by mouth once daily for 7 days, THEN 3 tablets (30 mg) once daily for 7 days, THEN 2 tablets (20 mg) once daily for 7 days, THEN 1 tablet (10 mg) once daily for 7 days.       rivaroxaban 15 mg tablet  Commonly known as: Xarelto      Take 1 tablet (15 mg) by mouth once daily in the evening. Take with meals. Take with food.       roflumilast 500 mcg tablet  Commonly known as: Daliresp           triamcinolone 0.1 % ointment  Commonly known as: Kenalog           Ventolin HFA 90 mcg/actuation inhaler  Generic drug: albuterol      Inhale 2 puffs every 4 hours if needed for wheezing.              STOP taking these medications      azithromycin 250 mg tablet  Commonly known as: Zithromax                  Where to Get Your Medications        These medications were sent to [id number] - [place], [place] - [address] AT [facility] [address]  [address], [facility] [address]      Phone: [phone]   magnesium oxide 400 mg tablet  nebivolol 2.5 mg tablet  potassium chloride CR 20 mEq ER tablet         Test Results Pending At Discharge  Pending Labs       No current pending labs.            Hospital Course   History Of Present Illness  Raheel Cloud is a 73 y.o. male presenting with chief complaint of heart palpitations and mild dyspnea.  Patient has a history of COPD currently on baseline 2 to 3 L of oxygen by nasal cannula.  He does endorse recent evaluation in this facility's emergency department for similar symptoms 48 hours prior.  He states that at that time he had spent the day overexerting himself, had temporarily been without his supplemental oxygen, but was able to convert back into normal sinus rhythm after a brief Cardizem infusion.  Patient was discharged home at that time.  He states symptoms are very similar today.  This includes onset of symptoms.  He states that on both instances, he felt as if he went into  atrial fibrillation later in the evening right before bed.  He states this is typically when he would take his beta-blocker and therefore the longest possible duration in between therapy.  Upon arrival to this facility, patient found to be in atrial fibrillation with RVR.  Was started on Cardizem infusion with improved ventricular rate control but remains in atrial fibrillation.  Admitted for further management.    Hospital course: Patient was admitted to the hospital with palpitations and found to have atrial fibrillation with rapid ventricular spots.  He does have a history of paroxysmal atrial fibrillation.  Patient was started on diltiazem drip as this converted him previously 2 days prior to admission when he came to the emergency department.  He did not convert him this time.  In addition he became hypotensive so the drip was turned off.  Patient was given albumin for fluid resuscitation and became normotensive.  In addition he was given an extra dose of Bystolic 2.5 mg and on this he did convert to normal sinus rhythm.  He remained in normal sinus rhythm and was evaluated by cardiology and cleared with plans for patient to follow-up with Dr. Myles.  He also has a CTA pending that Dr. Ibarhim has ordered and if unremarkable can later be safely started on Tikosyn.  Patient happy with this plan.  Patient encouraged/implored to stop smoking.  In addition he was told to try to avoid caffeine and chocolate.  Patient was given prescription for potassium and magnesium in addition to Bystolic 2.5mg BID.  He was discharged home in stable condition.    Discharge time greater than 35 minutes. Greater than 50% of time spent on counseling patient, coordination of care, medication reconciliation, transmitting medications to pharmacy and clarifying plan of care with nursing staff and case management.        Pertinent Physical Exam At Time of Discharge  Physical Exam  Constitutional:       General: He is not in acute  distress.     Appearance: Normal appearance.   HENT:      Head: Normocephalic and atraumatic.      Mouth/Throat:      Mouth: Mucous membranes are moist.   Eyes:      Extraocular Movements: Extraocular movements intact.      Conjunctiva/sclera: Conjunctivae normal.      Pupils: Pupils are equal, round, and reactive to light.   Cardiovascular:      Rate and Rhythm: Normal rate and regular rhythm.      Heart sounds: Normal heart sounds.   Pulmonary:      Effort: Pulmonary effort is normal.      Breath sounds: Wheezing and rhonchi present. No rales.   Abdominal:      General: Abdomen is flat. Bowel sounds are normal.      Palpations: Abdomen is soft.   Musculoskeletal:         General: No swelling or tenderness. Normal range of motion.      Cervical back: Normal range of motion and neck supple.   Skin:     General: Skin is warm and dry.      Findings: No rash.   Neurological:      General: No focal deficit present.      Mental Status: He is alert and oriented to person, place, and time.      Cranial Nerves: No cranial nerve deficit.      Sensory: No sensory deficit.   Psychiatric:         Mood and Affect: Mood normal.         Behavior: Behavior normal.         Outpatient Follow-Up  Future Appointments   Date Time Provider Department Center   6/20/2024  3:30 PM Paul Ibrahim MD [id number] [place]         Kevin Israel MD

## 2024-05-27 NOTE — NURSING NOTE
"S: Raheel Cloud \"Raheel ELIF\" arrived into 2102/2102-A at this time.    B:  Afib RVR    A:  Dr. Mattson notified that patient has arrived.     R: Will continue to monitor.     "

## 2024-05-28 ENCOUNTER — PATIENT OUTREACH (OUTPATIENT)
Dept: CARE COORDINATION | Facility: CLINIC | Age: 74
End: 2024-05-28
Payer: MEDICARE

## 2024-05-28 NOTE — PROGRESS NOTES
Discharge Facility: [facility]  Discharge Diagnosis: Atrial fibrillation  Admission Date: 5/27/2024 (readmit)  Discharge Date: 5/27/2024    PCP Appointment Date:  -Pt states he will call to schedule after he speaks to his family regarding transportation    Specialist Appointment Date:   -6/20/2024 1530 cardiology    Hospital Encounter and Summary: Linked     See discharge assessment below for further details    Engagement  Call Start Time: 0941 (5/28/2024  9:41 AM)    Medications  Medications reviewed with patient/caregiver?: Yes (new prescriptions reviewed; buspirone, magnesium oxide, potassium chloride, nebivolol) (5/28/2024  9:41 AM)  Is the patient having any side effects they believe may be caused by any medication additions or changes?: No (5/28/2024  9:41 AM)  Does the patient have all medications ordered at discharge?: Yes (5/28/2024  9:41 AM)  Care Management Interventions: No intervention needed (5/28/2024  9:41 AM)  Is the patient taking all medications as directed (includes completed medication regime)?: -- (pt states pharmacy was closed when he was discharged and he will be picking up new prescriptions today; he denies any questions or issues with medication) (5/28/2024  9:41 AM)    Appointments  Does the patient have a primary care provider?: Yes (5/28/2024  9:41 AM)  Care Management Interventions: Advised patient to make appointment (5/28/2024  9:41 AM)  Has the patient kept scheduled appointments due by today?: Yes (5/28/2024  9:41 AM)    Self Management  Has home health visited the patient within 72 hours of discharge?: Not applicable (5/28/2024  9:41 AM)    Patient Teaching  Does the patient have access to their discharge instructions?: Yes (5/28/2024  9:41 AM)  Care Management Interventions: Reviewed instructions with patient (5/28/2024  9:41 AM)  What is the patient's perception of their health status since discharge?: Same (5/28/2024  9:41 AM)  Is the patient/caregiver able to teach back the  "hierarchy of who to call/visit for symptoms/problems? PCP, Specialist, Home Health nurse, Urgent Care, ED, 911: Yes (5/28/2024  9:41 AM)    Wrap Up  Wrap Up Additional Comments: Pt was admitted to [facility] 5/27/2024 for atrial fibrillation. Pt reports feeling the same and \"hanging in there\". Pt discharged with new prescriptions for buspirone, magnesium oxide, nebivolol, and potassium chloride; pt reports pharmacy was closed when he got discharged yesterday and he will be picking up medication today. Pt denies any questions regarding medication. Pt reports understanding of discharge instructions. Attempted to schedule PCP follow up; pt states he will call to schedule after he speaks with his family regarding transportation. Pt denies any questions, needs, or concerns at this time. He is encouraged to call if questions or needs arise. (5/28/2024  9:41 AM)  Call End Time: 0943 (5/28/2024  9:41 AM)        "

## 2024-05-29 ENCOUNTER — TELEPHONE (OUTPATIENT)
Dept: CARDIOLOGY | Facility: CLINIC | Age: 74
End: 2024-05-29
Payer: MEDICARE

## 2024-05-29 DIAGNOSIS — R06.09 DOE (DYSPNEA ON EXERTION): Primary | ICD-10-CM

## 2024-05-29 DIAGNOSIS — I20.89 OTHER FORMS OF ANGINA PECTORIS (CMS-HCC): ICD-10-CM

## 2024-05-29 NOTE — PROGRESS NOTES
Spoke to the patient.  Each of the last 2 times he went into atrial fibrillation was right before he took his evening dose of nebivolol.  He would like to continue with nebivolol 2.5mg twice a day.  I told him that this was fine.  I explained the nebivolol may have more blood pressure lowering then a medicine such as metoprolol.  We can always consider switching over to metoprolol succinate in the future however he would like to stay on nebivolol for now.    He has cut back on his nicotine use and his caffeine use.    We have ordered a CT angiogram because of his dyspnea on exertion which may be an anginal equivalent.  He is an American male of 73 years of age with a longstanding history of tobacco use and COPD.  These are all risk factors for coronary disease.    If the CT angiogram is abnormal then we will schedule a left heart catheterization.  Otherwise, normal CT angiogram, will allow us the opportunity to use a wider array of antiarrhythmic therapy in the future if needed.

## 2024-05-29 NOTE — TELEPHONE ENCOUNTER
Patient called regarding recent hospital admission and getting a CT angio ordered/scheduled. He requested that you give him a call when you have a chance

## 2024-05-30 LAB
ATRIAL RATE: 69 BPM
P AXIS: 76 DEGREES
P OFFSET: 213 MS
P ONSET: 168 MS
PR INTERVAL: 120 MS
Q ONSET: 228 MS
QRS COUNT: 11 BEATS
QRS DURATION: 84 MS
QT INTERVAL: 374 MS
QTC CALCULATION(BAZETT): 400 MS
QTC FREDERICIA: 392 MS
R AXIS: 77 DEGREES
T AXIS: 61 DEGREES
T OFFSET: 415 MS
VENTRICULAR RATE: 69 BPM

## 2024-06-01 NOTE — DOCUMENTATION CLARIFICATION NOTE
"    PATIENT:               KENNA BRENNAN  ACCT #:                  [id number]  MRN:                       [id number]  :                       1950  ADMIT DATE:       2024 12:18 AM  DISCH DATE:        2024 5:15 PM  RESPONDING PROVIDER #:        [id number]          PROVIDER RESPONSE TEXT:    Acute Respiratory Failure ruled out after further workup; Chronic Hypoxic Respiratory Failure only    CDI QUERY TEXT:    Clarification    Instruction:    Based on your assessment of the patient and the clinical information, please provide the requested documentation by clicking on the appropriate radio button and enter any additional information if prompted.    Question: Please clarify diagnosis of respiratory failure as    When answering this query, please exercise your independent professional judgment. The fact that a question is being asked, does not imply that any particular answer is desired or expected.    The patient's clinical indicators include:  Clinical Information:  73M presents for palpitations, SOB, dizziness    Documented Diagnosis:  - HP, , Twila: \"Acute on chronic hypoxic respiratory failure\"    Clinical Indicators and Documentation:  -Vital Signs on admission, : 98.1-243-/73-94 percent on 3L NC  -ABG results: none  -Physical Exam Findings:  -Breath sounds: diminished  -Distress: mild dyspnea with palpitations  -Cyanosis: none  -Oxygen Therapy: remains on 3L home O2 via NC  -Pulmonary Consult: none    Treatment:  CXR, O2 therapy via NC, Pulmicort inh, Duoneb inh, prednisone    Risk Factors:  COPD, Chronic Hypoxic Respiratory Failure baseline 2 to 3 L of oxygen by nasal cannula  Options provided:  -- Acute Respiratory Failure ruled out after further workup; Chronic Hypoxic Respiratory Failure only  -- Acute Respiratory Failure as evidenced by, Please specify additional information below  -- Other - I will add my own diagnosis  -- Refer to Clinical Documentation Reviewer    Query created " by: Lorraine Meehan on 5/30/2024 4:05 PM      Electronically signed by:  SELENA LEWIS MD 6/1/2024 8:19 AM

## 2024-06-03 ENCOUNTER — TELEPHONE (OUTPATIENT)
Dept: CARDIOLOGY | Facility: CLINIC | Age: 74
End: 2024-06-03
Payer: MEDICARE

## 2024-06-03 NOTE — TELEPHONE ENCOUNTER
Patient was in hosp for Afib. He has a CT angiogram of his heart scheduled for mid July. He is scheduled to see you Aug 2. He just wants to make sure that this is OK with you and that you don't what him added to your schedule sooner.

## 2024-06-07 ENCOUNTER — APPOINTMENT (OUTPATIENT)
Dept: CARDIOLOGY | Facility: HOSPITAL | Age: 74
End: 2024-06-07
Payer: MEDICARE

## 2024-06-07 ENCOUNTER — APPOINTMENT (OUTPATIENT)
Dept: RADIOLOGY | Facility: HOSPITAL | Age: 74
End: 2024-06-07
Payer: MEDICARE

## 2024-06-07 ENCOUNTER — HOSPITAL ENCOUNTER (INPATIENT)
Facility: HOSPITAL | Age: 74
LOS: 1 days | Discharge: HOME | End: 2024-06-08
Attending: EMERGENCY MEDICINE | Admitting: INTERNAL MEDICINE
Payer: MEDICARE

## 2024-06-07 DIAGNOSIS — J18.9 PNEUMONIA OF RIGHT LOWER LOBE DUE TO INFECTIOUS ORGANISM: ICD-10-CM

## 2024-06-07 DIAGNOSIS — I48.91 ATRIAL FIBRILLATION WITH RAPID VENTRICULAR RESPONSE (MULTI): Primary | ICD-10-CM

## 2024-06-07 DIAGNOSIS — R09.02 HYPOXIA: ICD-10-CM

## 2024-06-07 DIAGNOSIS — J41.8 MIXED SIMPLE AND MUCOPURULENT CHRONIC BRONCHITIS (MULTI): ICD-10-CM

## 2024-06-07 LAB
ALBUMIN SERPL BCP-MCNC: 4.1 G/DL (ref 3.4–5)
ALP SERPL-CCNC: 46 U/L (ref 33–136)
ALT SERPL W P-5'-P-CCNC: 26 U/L (ref 10–52)
ANION GAP SERPL CALC-SCNC: 14 MMOL/L (ref 10–20)
AST SERPL W P-5'-P-CCNC: 13 U/L (ref 9–39)
BASOPHILS # BLD AUTO: 0.02 X10*3/UL (ref 0–0.1)
BASOPHILS NFR BLD AUTO: 0.2 %
BILIRUB SERPL-MCNC: 0.7 MG/DL (ref 0–1.2)
BNP SERPL-MCNC: 118 PG/ML (ref 0–99)
BUN SERPL-MCNC: 21 MG/DL (ref 6–23)
CALCIUM SERPL-MCNC: 9.4 MG/DL (ref 8.6–10.3)
CARDIAC TROPONIN I PNL SERPL HS: 5 NG/L (ref 0–20)
CARDIAC TROPONIN I PNL SERPL HS: 6 NG/L (ref 0–20)
CHLORIDE SERPL-SCNC: 96 MMOL/L (ref 98–107)
CO2 SERPL-SCNC: 32 MMOL/L (ref 21–32)
CREAT SERPL-MCNC: 0.98 MG/DL (ref 0.5–1.3)
EGFRCR SERPLBLD CKD-EPI 2021: 81 ML/MIN/1.73M*2
EOSINOPHIL # BLD AUTO: 0.01 X10*3/UL (ref 0–0.4)
EOSINOPHIL NFR BLD AUTO: 0.1 %
ERYTHROCYTE [DISTWIDTH] IN BLOOD BY AUTOMATED COUNT: 14.7 % (ref 11.5–14.5)
FLUAV RNA RESP QL NAA+PROBE: NOT DETECTED
FLUBV RNA RESP QL NAA+PROBE: NOT DETECTED
GLUCOSE SERPL-MCNC: 116 MG/DL (ref 74–99)
HCT VFR BLD AUTO: 41.2 % (ref 41–52)
HGB BLD-MCNC: 13 G/DL (ref 13.5–17.5)
HOLD SPECIMEN: NORMAL
IMM GRANULOCYTES # BLD AUTO: 0.14 X10*3/UL (ref 0–0.5)
IMM GRANULOCYTES NFR BLD AUTO: 1.1 % (ref 0–0.9)
LACTATE SERPL-SCNC: 1.6 MMOL/L (ref 0.4–2)
LYMPHOCYTES # BLD AUTO: 0.43 X10*3/UL (ref 0.8–3)
LYMPHOCYTES NFR BLD AUTO: 3.4 %
MAGNESIUM SERPL-MCNC: 2.33 MG/DL (ref 1.6–2.4)
MCH RBC QN AUTO: 29.2 PG (ref 26–34)
MCHC RBC AUTO-ENTMCNC: 31.6 G/DL (ref 32–36)
MCV RBC AUTO: 93 FL (ref 80–100)
MONOCYTES # BLD AUTO: 0.66 X10*3/UL (ref 0.05–0.8)
MONOCYTES NFR BLD AUTO: 5.3 %
NEUTROPHILS # BLD AUTO: 11.24 X10*3/UL (ref 1.6–5.5)
NEUTROPHILS NFR BLD AUTO: 89.9 %
NRBC BLD-RTO: 0 /100 WBCS (ref 0–0)
PLATELET # BLD AUTO: 202 X10*3/UL (ref 150–450)
POTASSIUM SERPL-SCNC: 4.5 MMOL/L (ref 3.5–5.3)
PROT SERPL-MCNC: 6.7 G/DL (ref 6.4–8.2)
RBC # BLD AUTO: 4.45 X10*6/UL (ref 4.5–5.9)
SARS-COV-2 RNA RESP QL NAA+PROBE: NOT DETECTED
SODIUM SERPL-SCNC: 137 MMOL/L (ref 136–145)
TSH SERPL-ACNC: 1.2 MIU/L (ref 0.44–3.98)
WBC # BLD AUTO: 12.5 X10*3/UL (ref 4.4–11.3)

## 2024-06-07 PROCEDURE — 84484 ASSAY OF TROPONIN QUANT: CPT | Performed by: EMERGENCY MEDICINE

## 2024-06-07 PROCEDURE — 2500000002 HC RX 250 W HCPCS SELF ADMINISTERED DRUGS (ALT 637 FOR MEDICARE OP, ALT 636 FOR OP/ED): Mod: MUE

## 2024-06-07 PROCEDURE — 2500000004 HC RX 250 GENERAL PHARMACY W/ HCPCS (ALT 636 FOR OP/ED)

## 2024-06-07 PROCEDURE — 87205 SMEAR GRAM STAIN: CPT | Mod: STJLAB

## 2024-06-07 PROCEDURE — 80053 COMPREHEN METABOLIC PANEL: CPT | Performed by: EMERGENCY MEDICINE

## 2024-06-07 PROCEDURE — 71045 X-RAY EXAM CHEST 1 VIEW: CPT | Mod: FOREIGN READ | Performed by: RADIOLOGY

## 2024-06-07 PROCEDURE — 87632 RESP VIRUS 6-11 TARGETS: CPT

## 2024-06-07 PROCEDURE — 99285 EMERGENCY DEPT VISIT HI MDM: CPT | Mod: 25

## 2024-06-07 PROCEDURE — 94640 AIRWAY INHALATION TREATMENT: CPT | Mod: MUE

## 2024-06-07 PROCEDURE — 93005 ELECTROCARDIOGRAM TRACING: CPT

## 2024-06-07 PROCEDURE — 36415 COLL VENOUS BLD VENIPUNCTURE: CPT | Performed by: EMERGENCY MEDICINE

## 2024-06-07 PROCEDURE — 83735 ASSAY OF MAGNESIUM: CPT | Performed by: EMERGENCY MEDICINE

## 2024-06-07 PROCEDURE — 84484 ASSAY OF TROPONIN QUANT: CPT | Mod: 91 | Performed by: EMERGENCY MEDICINE

## 2024-06-07 PROCEDURE — 87040 BLOOD CULTURE FOR BACTERIA: CPT | Mod: STJLAB | Performed by: EMERGENCY MEDICINE

## 2024-06-07 PROCEDURE — 2500000004 HC RX 250 GENERAL PHARMACY W/ HCPCS (ALT 636 FOR OP/ED): Performed by: EMERGENCY MEDICINE

## 2024-06-07 PROCEDURE — 2500000001 HC RX 250 WO HCPCS SELF ADMINISTERED DRUGS (ALT 637 FOR MEDICARE OP): Performed by: INTERNAL MEDICINE

## 2024-06-07 PROCEDURE — 87636 SARSCOV2 & INF A&B AMP PRB: CPT

## 2024-06-07 PROCEDURE — 2500000002 HC RX 250 W HCPCS SELF ADMINISTERED DRUGS (ALT 637 FOR MEDICARE OP, ALT 636 FOR OP/ED): Performed by: INTERNAL MEDICINE

## 2024-06-07 PROCEDURE — 71045 X-RAY EXAM CHEST 1 VIEW: CPT

## 2024-06-07 PROCEDURE — 99223 1ST HOSP IP/OBS HIGH 75: CPT | Performed by: INTERNAL MEDICINE

## 2024-06-07 PROCEDURE — 96366 THER/PROPH/DIAG IV INF ADDON: CPT

## 2024-06-07 PROCEDURE — 2060000001 HC INTERMEDIATE ICU ROOM DAILY

## 2024-06-07 PROCEDURE — 84443 ASSAY THYROID STIM HORMONE: CPT

## 2024-06-07 PROCEDURE — 87449 NOS EACH ORGANISM AG IA: CPT | Mod: STJLAB

## 2024-06-07 PROCEDURE — 2500000001 HC RX 250 WO HCPCS SELF ADMINISTERED DRUGS (ALT 637 FOR MEDICARE OP)

## 2024-06-07 PROCEDURE — 96367 TX/PROPH/DG ADDL SEQ IV INF: CPT

## 2024-06-07 PROCEDURE — 87899 AGENT NOS ASSAY W/OPTIC: CPT | Mod: STJLAB

## 2024-06-07 PROCEDURE — 99285 EMERGENCY DEPT VISIT HI MDM: CPT | Performed by: EMERGENCY MEDICINE

## 2024-06-07 PROCEDURE — S4991 NICOTINE PATCH NONLEGEND: HCPCS

## 2024-06-07 PROCEDURE — 83605 ASSAY OF LACTIC ACID: CPT | Performed by: EMERGENCY MEDICINE

## 2024-06-07 PROCEDURE — 99223 1ST HOSP IP/OBS HIGH 75: CPT

## 2024-06-07 PROCEDURE — 93010 ELECTROCARDIOGRAM REPORT: CPT | Performed by: EMERGENCY MEDICINE

## 2024-06-07 PROCEDURE — 2500000005 HC RX 250 GENERAL PHARMACY W/O HCPCS: Performed by: INTERNAL MEDICINE

## 2024-06-07 PROCEDURE — 94640 AIRWAY INHALATION TREATMENT: CPT

## 2024-06-07 PROCEDURE — 96365 THER/PROPH/DIAG IV INF INIT: CPT

## 2024-06-07 PROCEDURE — 2500000002 HC RX 250 W HCPCS SELF ADMINISTERED DRUGS (ALT 637 FOR MEDICARE OP, ALT 636 FOR OP/ED)

## 2024-06-07 PROCEDURE — 2500000005 HC RX 250 GENERAL PHARMACY W/O HCPCS: Performed by: EMERGENCY MEDICINE

## 2024-06-07 PROCEDURE — 83880 ASSAY OF NATRIURETIC PEPTIDE: CPT | Performed by: EMERGENCY MEDICINE

## 2024-06-07 PROCEDURE — 85025 COMPLETE CBC W/AUTO DIFF WBC: CPT | Performed by: EMERGENCY MEDICINE

## 2024-06-07 RX ORDER — IPRATROPIUM BROMIDE AND ALBUTEROL SULFATE 2.5; .5 MG/3ML; MG/3ML
3 SOLUTION RESPIRATORY (INHALATION)
Status: DISCONTINUED | OUTPATIENT
Start: 2024-06-07 | End: 2024-06-07

## 2024-06-07 RX ORDER — DILTIAZEM HCL/D5W 125 MG/125
5-15 PLASTIC BAG, INJECTION (ML) INTRAVENOUS CONTINUOUS
Status: DISCONTINUED | OUTPATIENT
Start: 2024-06-07 | End: 2024-06-07

## 2024-06-07 RX ORDER — IPRATROPIUM BROMIDE AND ALBUTEROL SULFATE 2.5; .5 MG/3ML; MG/3ML
SOLUTION RESPIRATORY (INHALATION)
Status: COMPLETED
Start: 2024-06-07 | End: 2024-06-07

## 2024-06-07 RX ORDER — ONDANSETRON 4 MG/1
4 TABLET, ORALLY DISINTEGRATING ORAL EVERY 8 HOURS PRN
Status: DISCONTINUED | OUTPATIENT
Start: 2024-06-07 | End: 2024-06-08 | Stop reason: HOSPADM

## 2024-06-07 RX ORDER — CEFTRIAXONE 2 G/50ML
2 INJECTION, SOLUTION INTRAVENOUS ONCE
Status: COMPLETED | OUTPATIENT
Start: 2024-06-07 | End: 2024-06-07

## 2024-06-07 RX ORDER — MONTELUKAST SODIUM 10 MG/1
10 TABLET ORAL NIGHTLY
Status: DISCONTINUED | OUTPATIENT
Start: 2024-06-07 | End: 2024-06-07

## 2024-06-07 RX ORDER — DILTIAZEM HCL/D5W 125 MG/125
PLASTIC BAG, INJECTION (ML) INTRAVENOUS
Status: DISPENSED
Start: 2024-06-07 | End: 2024-06-07

## 2024-06-07 RX ORDER — FUROSEMIDE 20 MG/1
20 TABLET ORAL DAILY
COMMUNITY
End: 2024-06-08 | Stop reason: HOSPADM

## 2024-06-07 RX ORDER — GUAIFENESIN/DEXTROMETHORPHAN 100-10MG/5
5 SYRUP ORAL EVERY 4 HOURS PRN
Status: DISCONTINUED | OUTPATIENT
Start: 2024-06-07 | End: 2024-06-08 | Stop reason: HOSPADM

## 2024-06-07 RX ORDER — IPRATROPIUM BROMIDE AND ALBUTEROL SULFATE 2.5; .5 MG/3ML; MG/3ML
3 SOLUTION RESPIRATORY (INHALATION)
Status: DISCONTINUED | OUTPATIENT
Start: 2024-06-07 | End: 2024-06-08 | Stop reason: HOSPADM

## 2024-06-07 RX ORDER — DILTIAZEM HYDROCHLORIDE 5 MG/ML
10 INJECTION INTRAVENOUS ONCE
Status: COMPLETED | OUTPATIENT
Start: 2024-06-07 | End: 2024-06-07

## 2024-06-07 RX ORDER — IBUPROFEN 200 MG
1 TABLET ORAL DAILY
Status: DISCONTINUED | OUTPATIENT
Start: 2024-06-07 | End: 2024-06-08 | Stop reason: HOSPADM

## 2024-06-07 RX ORDER — IPRATROPIUM BROMIDE AND ALBUTEROL SULFATE 2.5; .5 MG/3ML; MG/3ML
3 SOLUTION RESPIRATORY (INHALATION) EVERY 2 HOUR PRN
Status: DISCONTINUED | OUTPATIENT
Start: 2024-06-07 | End: 2024-06-08 | Stop reason: HOSPADM

## 2024-06-07 RX ORDER — AZITHROMYCIN 500 MG/1
500 TABLET, FILM COATED ORAL
Status: DISCONTINUED | OUTPATIENT
Start: 2024-06-08 | End: 2024-06-07

## 2024-06-07 RX ORDER — BUDESONIDE 0.5 MG/2ML
1 INHALANT ORAL
Status: DISCONTINUED | OUTPATIENT
Start: 2024-06-07 | End: 2024-06-08 | Stop reason: HOSPADM

## 2024-06-07 RX ORDER — DILTIAZEM HYDROCHLORIDE 5 MG/ML
INJECTION INTRAVENOUS
Status: COMPLETED
Start: 2024-06-07 | End: 2024-06-07

## 2024-06-07 RX ORDER — ONDANSETRON HYDROCHLORIDE 2 MG/ML
4 INJECTION, SOLUTION INTRAVENOUS EVERY 8 HOURS PRN
Status: DISCONTINUED | OUTPATIENT
Start: 2024-06-07 | End: 2024-06-08 | Stop reason: HOSPADM

## 2024-06-07 RX ORDER — BISMUTH SUBSALICYLATE 262 MG/1
524 TABLET ORAL
Status: DISCONTINUED | OUTPATIENT
Start: 2024-06-07 | End: 2024-06-08 | Stop reason: HOSPADM

## 2024-06-07 RX ORDER — FLUTICASONE PROPIONATE 50 MCG
2 SPRAY, SUSPENSION (ML) NASAL 2 TIMES DAILY
Status: DISCONTINUED | OUTPATIENT
Start: 2024-06-07 | End: 2024-06-08 | Stop reason: HOSPADM

## 2024-06-07 RX ORDER — NEBIVOLOL 2.5 MG/1
2.5 TABLET ORAL 2 TIMES DAILY
Status: DISCONTINUED | OUTPATIENT
Start: 2024-06-07 | End: 2024-06-08

## 2024-06-07 RX ORDER — METOPROLOL TARTRATE 1 MG/ML
INJECTION, SOLUTION INTRAVENOUS
Status: DISCONTINUED
Start: 2024-06-07 | End: 2024-06-07 | Stop reason: WASHOUT

## 2024-06-07 RX ORDER — AZITHROMYCIN 250 MG/1
250 TABLET, FILM COATED ORAL EVERY OTHER DAY
COMMUNITY
Start: 2024-05-24 | End: 2024-06-08 | Stop reason: HOSPADM

## 2024-06-07 RX ORDER — GUAIFENESIN 600 MG/1
600 TABLET, EXTENDED RELEASE ORAL EVERY 12 HOURS PRN
Status: DISCONTINUED | OUTPATIENT
Start: 2024-06-07 | End: 2024-06-08 | Stop reason: HOSPADM

## 2024-06-07 RX ORDER — TAMSULOSIN HYDROCHLORIDE 0.4 MG/1
0.4 CAPSULE ORAL NIGHTLY
Status: DISCONTINUED | OUTPATIENT
Start: 2024-06-07 | End: 2024-06-08 | Stop reason: HOSPADM

## 2024-06-07 RX ADMIN — BISMUTH SUBSALICYLATE 524 MG: 262 TABLET, CHEWABLE ORAL at 21:19

## 2024-06-07 RX ADMIN — DILTIAZEM HYDROCHLORIDE 10 MG: 5 INJECTION INTRAVENOUS at 01:20

## 2024-06-07 RX ADMIN — METHYLPREDNISOLONE SODIUM SUCCINATE 40 MG: 40 INJECTION, POWDER, FOR SOLUTION INTRAMUSCULAR; INTRAVENOUS at 09:18

## 2024-06-07 RX ADMIN — IPRATROPIUM BROMIDE AND ALBUTEROL SULFATE 3 ML: 2.5; .5 SOLUTION RESPIRATORY (INHALATION) at 17:07

## 2024-06-07 RX ADMIN — NEBIVOLOL 2.5 MG: 2.5 TABLET ORAL at 14:03

## 2024-06-07 RX ADMIN — BUDESONIDE 0.5 MG: 0.5 INHALANT RESPIRATORY (INHALATION) at 20:32

## 2024-06-07 RX ADMIN — SODIUM CHLORIDE, POTASSIUM CHLORIDE, SODIUM LACTATE AND CALCIUM CHLORIDE 1000 ML: 600; 310; 30; 20 INJECTION, SOLUTION INTRAVENOUS at 04:45

## 2024-06-07 RX ADMIN — TAMSULOSIN HYDROCHLORIDE 0.4 MG: 0.4 CAPSULE ORAL at 20:10

## 2024-06-07 RX ADMIN — IPRATROPIUM BROMIDE AND ALBUTEROL SULFATE 3 ML: 2.5; .5 SOLUTION RESPIRATORY (INHALATION) at 20:32

## 2024-06-07 RX ADMIN — IPRATROPIUM BROMIDE AND ALBUTEROL SULFATE 3 ML: 2.5; .5 SOLUTION RESPIRATORY (INHALATION) at 08:46

## 2024-06-07 RX ADMIN — AZITHROMYCIN MONOHYDRATE 500 MG: 500 INJECTION, POWDER, LYOPHILIZED, FOR SOLUTION INTRAVENOUS at 04:13

## 2024-06-07 RX ADMIN — IPRATROPIUM BROMIDE AND ALBUTEROL SULFATE 3 ML: 2.5; .5 SOLUTION RESPIRATORY (INHALATION) at 04:40

## 2024-06-07 RX ADMIN — CEFTRIAXONE SODIUM 2 G: 2 INJECTION, SOLUTION INTRAVENOUS at 03:37

## 2024-06-07 RX ADMIN — Medication 3 L/MIN: at 20:37

## 2024-06-07 RX ADMIN — SODIUM CHLORIDE, POTASSIUM CHLORIDE, SODIUM LACTATE AND CALCIUM CHLORIDE 1000 ML: 600; 310; 30; 20 INJECTION, SOLUTION INTRAVENOUS at 07:38

## 2024-06-07 RX ADMIN — FLUTICASONE PROPIONATE 2 SPRAY: 50 SPRAY, METERED NASAL at 20:10

## 2024-06-07 RX ADMIN — NICOTINE 1 PATCH: 14 PATCH, EXTENDED RELEASE TRANSDERMAL at 09:18

## 2024-06-07 RX ADMIN — IPRATROPIUM BROMIDE AND ALBUTEROL SULFATE 3 ML: 2.5; .5 SOLUTION RESPIRATORY (INHALATION) at 12:55

## 2024-06-07 RX ADMIN — RIVAROXABAN 15 MG: 15 TABLET, FILM COATED ORAL at 17:06

## 2024-06-07 RX ADMIN — FLUTICASONE PROPIONATE 2 SPRAY: 50 SPRAY, METERED NASAL at 14:03

## 2024-06-07 RX ADMIN — NEBIVOLOL 2.5 MG: 2.5 TABLET ORAL at 20:10

## 2024-06-07 RX ADMIN — Medication 3 L/MIN: at 08:47

## 2024-06-07 RX ADMIN — IPRATROPIUM BROMIDE AND ALBUTEROL SULFATE 3 ML: .5; 3 SOLUTION RESPIRATORY (INHALATION) at 04:40

## 2024-06-07 RX ADMIN — BUDESONIDE 1 MG: 0.5 INHALANT RESPIRATORY (INHALATION) at 12:45

## 2024-06-07 SDOH — SOCIAL STABILITY: SOCIAL INSECURITY: WERE YOU ABLE TO COMPLETE ALL THE BEHAVIORAL HEALTH SCREENINGS?: YES

## 2024-06-07 SDOH — SOCIAL STABILITY: SOCIAL INSECURITY: HAS ANYONE EVER THREATENED TO HURT YOUR FAMILY OR YOUR PETS?: NO

## 2024-06-07 SDOH — SOCIAL STABILITY: SOCIAL INSECURITY: ARE THERE ANY APPARENT SIGNS OF INJURIES/BEHAVIORS THAT COULD BE RELATED TO ABUSE/NEGLECT?: NO

## 2024-06-07 SDOH — SOCIAL STABILITY: SOCIAL INSECURITY: ABUSE: ADULT

## 2024-06-07 SDOH — SOCIAL STABILITY: SOCIAL INSECURITY: DO YOU FEEL UNSAFE GOING BACK TO THE PLACE WHERE YOU ARE LIVING?: NO

## 2024-06-07 SDOH — SOCIAL STABILITY: SOCIAL INSECURITY: HAVE YOU HAD THOUGHTS OF HARMING ANYONE ELSE?: NO

## 2024-06-07 SDOH — SOCIAL STABILITY: SOCIAL INSECURITY: DOES ANYONE TRY TO KEEP YOU FROM HAVING/CONTACTING OTHER FRIENDS OR DOING THINGS OUTSIDE YOUR HOME?: NO

## 2024-06-07 SDOH — SOCIAL STABILITY: SOCIAL INSECURITY: ARE YOU OR HAVE YOU BEEN THREATENED OR ABUSED PHYSICALLY, EMOTIONALLY, OR SEXUALLY BY ANYONE?: NO

## 2024-06-07 SDOH — SOCIAL STABILITY: SOCIAL INSECURITY: DO YOU FEEL ANYONE HAS EXPLOITED OR TAKEN ADVANTAGE OF YOU FINANCIALLY OR OF YOUR PERSONAL PROPERTY?: NO

## 2024-06-07 ASSESSMENT — COGNITIVE AND FUNCTIONAL STATUS - GENERAL
DAILY ACTIVITIY SCORE: 24
MOBILITY SCORE: 24
MOBILITY SCORE: 24
DAILY ACTIVITIY SCORE: 24
PATIENT BASELINE BEDBOUND: NO

## 2024-06-07 ASSESSMENT — PAIN SCALES - GENERAL
PAINLEVEL_OUTOF10: 0 - NO PAIN

## 2024-06-07 ASSESSMENT — PATIENT HEALTH QUESTIONNAIRE - PHQ9
2. FEELING DOWN, DEPRESSED OR HOPELESS: NOT AT ALL
SUM OF ALL RESPONSES TO PHQ9 QUESTIONS 1 & 2: 0
1. LITTLE INTEREST OR PLEASURE IN DOING THINGS: NOT AT ALL

## 2024-06-07 ASSESSMENT — LIFESTYLE VARIABLES
TOTAL SCORE: 0
HAVE PEOPLE ANNOYED YOU BY CRITICIZING YOUR DRINKING: NO
HOW OFTEN DO YOU HAVE A DRINK CONTAINING ALCOHOL: MONTHLY OR LESS
EVER HAD A DRINK FIRST THING IN THE MORNING TO STEADY YOUR NERVES TO GET RID OF A HANGOVER: NO
HOW MANY STANDARD DRINKS CONTAINING ALCOHOL DO YOU HAVE ON A TYPICAL DAY: 1 OR 2
HAVE YOU EVER FELT YOU SHOULD CUT DOWN ON YOUR DRINKING: NO
EVER FELT BAD OR GUILTY ABOUT YOUR DRINKING: NO
AUDIT-C TOTAL SCORE: 1
AUDIT-C TOTAL SCORE: 1
HOW OFTEN DO YOU HAVE 6 OR MORE DRINKS ON ONE OCCASION: NEVER
SKIP TO QUESTIONS 9-10: 1

## 2024-06-07 ASSESSMENT — ACTIVITIES OF DAILY LIVING (ADL)
GROOMING: INDEPENDENT
WALKS IN HOME: INDEPENDENT
JUDGMENT_ADEQUATE_SAFELY_COMPLETE_DAILY_ACTIVITIES: YES
DRESSING YOURSELF: INDEPENDENT
HEARING - LEFT EAR: FUNCTIONAL
HEARING - RIGHT EAR: FUNCTIONAL
TOILETING: INDEPENDENT
LACK_OF_TRANSPORTATION: NO
PATIENT'S MEMORY ADEQUATE TO SAFELY COMPLETE DAILY ACTIVITIES?: YES
ADEQUATE_TO_COMPLETE_ADL: YES
BATHING: INDEPENDENT
FEEDING YOURSELF: INDEPENDENT

## 2024-06-07 ASSESSMENT — PAIN - FUNCTIONAL ASSESSMENT
PAIN_FUNCTIONAL_ASSESSMENT: 0-10

## 2024-06-07 NOTE — H&P
Internal Medicine    Admission H&P      Patient Raheel Cloud PCP Silvestre Rock DO    MRN [id number]  Admission Date 6/7/2024      Chief Complaint/Reason for Admission:  Raheel is a 73 y.o. male who presented today with shortness of breath.    Subjective    Subjective   History of Presenting Illness  Mr. Raheel Cloud is a 73 y.o. male with PMH permanent A-fib (on Xarelto and nebivolol for rate control follows with Dr. Ibrahim), COPD (on 3 L home oxygen and follows with Dr. Velazquez), dyslipidemia, BPH, prostate cancer (s/p chemo and radiotherapy), who presented to the ED with shortness of breath.  He reported dyspnea on exertion getting worse for the last few days and all of a sudden had palpitations this evening associated with funny feeling in his throat which he knows that he is having A-fib. This is a third episode for the last month.  Of note, 2 weeks ago he was admitted to the hospital for COPD exacerbation for which she was discharged on prednisone tapering.  He reported leg swelling secondary to steroids but denied chest pain, orthopnea or PND.  He reported that he was started on Lasix 3 days ago for leg swelling and he peed a lot and he has never been on Lasix before.  No recent upper respiratory tract infection or alcohol use. He is compliant with his medication.  ROS was positive for 3 episodes of nonbloody diarrhea for which she believes related to the dinner (hamburger) he ate last night at one of the restaurants.    ED course:  V/S: /101, , RR 24, temperature 36.4 (97.5), SpO2 94% on 3 L NC.  Labs: CBC did show WBC 12.5, H/H13.0/41.1, platelet 202.  CMP did show sodium 137, potassium 4.5, BUNs/creatinine 21/0.98.  .  Troponin (516), lactate 1.6.  EKG: A-fib with RVR with no ST/T wave changes or ischemic pattern.  QTc 430 ms.  Imaging:  -CXR did show patchy infiltrates in the right lower lung zone (a bit worse than x-ray 2 weeks ago)  Intervention:  -He was started on Cardizem  drip.  Shortly after his blood pressure drops down to 85/57.    Past Medical History  Active Ambulatory Problems     Diagnosis Date Noted    Prostate cancer (Multi) 05/11/2023    Angina pectoris (CMS-HCC) 05/11/2023    BPH (benign prostatic hyperplasia) 06/01/2023    Frequent PVCs 06/01/2023    HLD (hyperlipidemia) 06/01/2023    Hypoxia 06/01/2023    PAC (premature atrial contraction) 06/01/2023    Smoker 06/01/2023    Umbilical hernia without obstruction and without gangrene 06/01/2023    Chronic obstructive pulmonary disease (Multi) 10/04/2023    Paroxysmal atrial fibrillation (Multi) 10/10/2023    Hypercoagulable state due to paroxysmal atrial fibrillation (Multi) 05/10/2024    Hyperglycemia 05/27/2024    Atrial fibrillation with RVR (Multi) 05/27/2024     Resolved Ambulatory Problems     Diagnosis Date Noted    COPD exacerbation (Multi) 05/04/2024    Acute on chronic hypoxic respiratory failure (Multi) 05/10/2024    Pneumonia 05/10/2024     Past Medical History:   Diagnosis Date    Personal history of other diseases of the respiratory system 06/23/2021       Past Surgical History   Past Surgical History:   Procedure Laterality Date    OTHER SURGICAL HISTORY  09/24/2020    Umbilical hernia repair laparoscopic    OTHER SURGICAL HISTORY  09/24/2020    Peritoneal adhesiolysis     Social History  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  Smokes cigarettes 1 pack/day, denies alcohol or IV drug use.  Home Medication:  Prior to Admission medications    Medication Sig Start Date End Date Taking? Authorizing Provider   ammonium lactate (Amlactin) 12 % cream Apply 1 Application topically 2 times a day as needed for dry skin.    Historical Provider, MD   azithromycin (Zithromax) 250 mg tablet Take 1 tablet (250 mg) by mouth every other day for 14 days. 5/12/24   Kevin Israel MD   budesonide (Pulmicort) 1 mg/2 mL nebulizer solution Take 2 mL (1 mg) by nebulization 2 times a day. Rinse mouth with water after use  to reduce aftertaste and incidence of candidiasis. Do not swallow.    Historical Provider, MD   busPIRone (Buspar) 5 mg tablet Take 1 tablet (5 mg) by mouth 2 times a day. 5/27/24   Kevin Israel MD   Flomax 0.4 mg 24 hr capsule Take 1 capsule (0.4 mg) by mouth once daily.  Patient taking differently: Take 1 capsule (0.4 mg) by mouth once daily at bedtime. 1/30/24 1/29/25  Silvestre Rock DO   fluticasone (Flonase) 50 mcg/actuation nasal spray Administer 2 sprays into each nostril 2 times a day. Shake gently. Before first use, prime pump. After use, clean tip and replace cap. 5/12/24   Kevin Israel MD   ipratropium-albuteroL (Combivent Respimat)  mcg/actuation inhaler Inhale 1 puff every 4 hours if needed for wheezing.    Historical Provider, MD   ipratropium-albuteroL (Duo-Neb) 0.5-2.5 mg/3 mL nebulizer solution Inhale 3 mL 4 times a day. 5/5/23   Historical Provider, MD   magnesium oxide (Mag-Ox) 400 mg tablet Take 1 tablet (400 mg) by mouth once daily. 5/27/24 6/26/24  Kevin Israel MD   montelukast (Singulair) 10 mg tablet Take 1 tablet (10 mg) by mouth once daily at bedtime. 5/20/24 11/16/24  Silvestre Rock DO   nebivolol (Bystolic) 2.5 mg tablet Take 1 tablet (2.5 mg) by mouth 2 times a day. 5/27/24   Kevin Israel MD   ondansetron (Zofran) 8 mg tablet Take 1 tablet (8 mg) by mouth every 8 hours if needed for nausea or vomiting. 5/12/24   Kevin Israel MD   oxygen (O2) gas therapy Inhale 3 L/min continuously. Indications: hypoxia    Historical Provider, MD   potassium chloride CR (Klor-Con M20) 20 mEq ER tablet Take 1 tablet (20 mEq) by mouth once daily. Do not crush or chew. 5/27/24 6/26/24  Kevin Israel MD   predniSONE (Deltasone) 10 mg tablet Take 4 tablets (40 mg) by mouth once daily for 7 days, THEN 3 tablets (30 mg) once daily for 7 days, THEN 2 tablets (20 mg) once daily for 7 days, THEN 1 tablet (10 mg) once daily for 7 days. 5/20/24  "6/16/24  Silvestre Rock DO   rivaroxaban (Xarelto) 15 mg tablet Take 1 tablet (15 mg) by mouth once daily in the evening. Take with meals. Take with food. 10/10/23 10/9/24  Paul Ibarhim MD   roflumilast (Daliresp) 500 mcg tablet Take 1 tablet (500 mcg) by mouth once daily. 4/29/24   Historical Provider, MD   triamcinolone (Kenalog) 0.1 % ointment Apply 1 Application topically 2 times a day as needed.    Historical Provider, MD   Ventolin HFA 90 mcg/actuation inhaler Inhale 2 puffs every 4 hours if needed for wheezing. 1/18/24 1/17/25  Silvestre Rock DO        Allergies:  No Known Allergies     Review of System:  10 point review of system is negative except what mentioned HPI.    Objective    Objective   Vital Signs:  Visit Vitals  BP 87/54   Pulse (!) 101   Temp 36.4 °C (97.5 °F) (Temporal)   Resp (!) 23   Ht 1.778 m (5' 10\")   Wt 68 kg (150 lb)   SpO2 94%   BMI 21.52 kg/m²   Smoking Status Every Day   BSA 1.83 m²        Physical Examination:  Constitutional: A&Ox4,  in mild respiratory distress but not in pain.  On 3 L nasal cannula.  Head and Face: Atraumatic, normocephalic   Eyes: Normal external exam, EOMI  ENT: Normal external inspection of ears and nose. Oropharynx normal.  Cardiovascular: Irregular rhythm, S1/S2, no murmurs, rubs, or gallops, radial pulses +2  Pulmonary: Equal air entry bilaterally, diffuse rhonchi all over the chest.  No crackles or wheezing appreciated.  Abdomen: +BS, soft, non-tender, nondistended, no guarding rigidity or rebound tenderness, no masses noted  MSK: +2/3 pitting edema up to mid shin bilaterally, No joint swelling, normal movements of all extremities.   Neuro: No focal deficits, normal motor function, normal sensation, follows all commands  Skin- No lesions, contusions, or erythema.  Psychiatric: Judgment intact. Appropriate mood, affect and behavior    Laboratory Data:    Results from last 7 days   Lab Units 06/07/24  0139   WBC AUTO x10*3/uL 12.5*   RBC AUTO " x10*6/uL 4.45*   HEMOGLOBIN g/dL 13.0*     Results from last 7 days   Lab Units 06/07/24  0139   SODIUM mmol/L 137   POTASSIUM mmol/L 4.5   CHLORIDE mmol/L 96*   CO2 mmol/L 32   BUN mg/dL 21   CREATININE mg/dL 0.98   CALCIUM mg/dL 9.4   MAGNESIUM mg/dL 2.33   BILIRUBIN TOTAL mg/dL 0.7   ALT U/L 26   AST U/L 13       Imaging:  XR chest 1 view    Result Date: 6/7/2024  STUDY: Chest Radiograph;  6/7/2024 at 1:47 AM INDICATION: Chest pain. COMPARISON: XR chest 5/27/2024, XR chest 5/24/2024, XR chest 5/4/2024, XR chest 5/21/2023. ACCESSION NUMBER(S): [id number] ORDERING CLINICIAN: MARU GREGG TECHNIQUE:  Frontal chest was obtained at 0147 hours. FINDINGS: CARDIOMEDIASTINAL SILHOUETTE: Cardiomediastinal silhouette is normal in size and configuration.  LUNGS: Subtle patchy opacities in the right lower lung may represent developing infiltrate.  No distinct lobar consolidation.  No overt edema.  No pleural effusion or pneumothorax.  ABDOMEN: No remarkable upper abdominal findings.  BONES: No acute osseous changes.    Possible early developing infiltrate in the right lower lung. Signed by Ricco Mullins MD      Medications:  Scheduled medications  azithromycin, 500 mg, intravenous, Once      Continuous medications     PRN medications      Assessment    Assessment & Plan   Mr. Raheel Cloud is a 73 y.o. male who presents with shortness of breath or palpitations.  Found to have A-fib with RVR with borderline blood pressure.  Physical examination remarkable for diffuse rhonchi bilaterally with pitting edema lower extremities.  Patient initially started on Cardizem drip but then discontinued.  Differential to triggering factors for the A-fib includes: Dehydration/volume down 2/2 Lasix and diarrhea versus infection versus COPD exacerbation versus ischemia infarction (ruled out with normal troponin and EKG) versus idiopathic.  Admitted as a case of A-fib with RVR and COPD exacerbation.  Plan as below.      Principal Problem:     Atrial fibrillation with rapid ventricular response (Multi)       # Shortness of breath  # A-fib with RVR  # Hypotension; likely 2/2 Cardizem  # Mild leukocytosis; 2/2 prednisone use  -Respiratory viral panel and COVID pending.  -Orthostatic pending  -Was given 1 L of LR.  Will do another liter for his hypotension.  -Lactate is normal >> no endorgan damage.  -DC Cardizem drip.  -Continue Xarelto  -If he is still on A-fib with RVR and soft blood pressure despite appropriate fluid resuscitation, amiodarone can be started.  -Target heart rate at rest less than 110    # Acute exacerbation of COPD  # Chronic COPD; on 3L NC   # Dyspnea on exertion  -Solu-Medrol 40 mg IV for COPD exacerbation.  -DC ceftriaxone as low concern for pneumonia given no fever, increased oxygen requirement, or crackles on examination.  -Continue with azithromycin 500 mg for 3 days.  -Fran-Neb scheduled Q 4hr  -Incentive spirometry  -Bronchial hygiene  -Robitussin for cough  -Sputum culture pending  -PT/OT.    # History of smoking  -Nicotine patch    # Dyslipidemia  # BPH  # History of prostate cancer; s/p chemotherapy and radiotherapy  -Continue home medications as appropriate once med rec is done    Global Plan of Care  Fluid: PRN  Electrolytes: PRN  Nutrition: Cardiac  DVT Prophylaxis: Xarelto  GI Prophylaxis: None  Disposition: Anticipate 1-2 midnights stay  Code Status: Full Code     Emergency Contact: Extended Emergency Contact Information  Primary Emergency Contact: James Cloud Dr, Jr.  Home Phone: [phone]  Relation: Child    To be staffed with attending.  Signature: Tyler Moreira MD  Date: June 7, 2024  Please excuse any misspellings or unintended errors related to the Dragon speech recognition software used to dictate this note.

## 2024-06-07 NOTE — ED PROVIDER NOTES
"HPI   Chief Complaint   Patient presents with    Irregular Heart Beat     Pt presents to the ED with A Fib RVR. Pt denies chest pain but states that he has a \"weird sensation in the back of his throat\" when he states is his typical presentation. Pt is SOB but states that it is his baseline SOB and is not worse.        73-year-old male with past medical history of COPD, atrial fibrillation on Xarelto and Bystolic presenting to the ED for concerns of atrial fibrillation.  Patient states this is the third presentation this month for A-fib.  Initially he was converted with Cardizem in the ED and discharged home but subsequently was admitted on a Cardizem infusion.  At that time he was evaluated by Dr. Villa for cardiology and Bystolic was increased to twice daily.  This evening had a funny sensation in the back of his throat and this is his indication that he is in A-fib.  No chest pain, dizziness or weakness.  Does have shortness of breath but states this is baseline with his COPD.                          Lyndsey Coma Scale Score: 15                     Patient History   Past Medical History:   Diagnosis Date    Personal history of other diseases of the respiratory system 06/23/2021    History of chronic obstructive lung disease     Past Surgical History:   Procedure Laterality Date    OTHER SURGICAL HISTORY  09/24/2020    Umbilical hernia repair laparoscopic    OTHER SURGICAL HISTORY  09/24/2020    Peritoneal adhesiolysis     No family history on file.  Social History     Tobacco Use    Smoking status: Every Day     Current packs/day: 2.00     Types: Cigarettes    Smokeless tobacco: Never   Vaping Use    Vaping status: Never Used   Substance Use Topics    Alcohol use: Not Currently    Drug use: Not Currently       Physical Exam   ED Triage Vitals [06/07/24 0110]   Temperature Heart Rate Respirations BP   36.4 °C (97.5 °F) (!) 156 (!) 24 (!) 133/101      Pulse Ox Temp Source Heart Rate Source Patient Position   94 " % Temporal Monitor Lying      BP Location FiO2 (%)     Right arm --       Physical Exam  Vitals and nursing note reviewed.   Constitutional:       General: He is not in acute distress.     Appearance: He is not ill-appearing or toxic-appearing.   HENT:      Head: Normocephalic and atraumatic.      Nose: Nose normal.      Mouth/Throat:      Mouth: Mucous membranes are moist.   Eyes:      Extraocular Movements: Extraocular movements intact.      Conjunctiva/sclera: Conjunctivae normal.   Cardiovascular:      Rate and Rhythm: Tachycardia present. Rhythm irregular.      Pulses: Normal pulses.      Heart sounds: Normal heart sounds.   Pulmonary:      Breath sounds: Normal breath sounds.      Comments: Increased work of breathing  Abdominal:      General: There is no distension.      Palpations: Abdomen is soft.      Tenderness: There is no abdominal tenderness.   Musculoskeletal:         General: Normal range of motion.      Cervical back: Normal range of motion and neck supple.      Right lower leg: Edema present.      Left lower leg: Edema present.   Skin:     General: Skin is warm and dry.      Capillary Refill: Capillary refill takes less than 2 seconds.   Neurological:      General: No focal deficit present.      Mental Status: He is alert and oriented to person, place, and time. Mental status is at baseline.         ED Course & MDM   Diagnoses as of 06/07/24 0355   Atrial fibrillation with rapid ventricular response (Multi)   Pneumonia of right lower lobe due to infectious organism       Medical Decision Making  73-year-old male presenting to the ED with atrial fibrillation.  Patient is nontoxic-appearing.  Heart rate 150s-160s.  Blood pressure 130s/100s.  Equal pulses in all extremities.  Patient started on a Cardizem infusion with a 10 mg bolus.  Will hold on IV fluids given edema in the lower extremities and patient was recently started on Lasix.    Labs show normal cardiac enzymes and electrolytes.  However  chest x-ray does show an early developing right lower lobe pneumonia.  Lactate and blood cultures were obtained given a leukocytosis on labs and patient started on Rocephin and azithromycin.    On reevaluation patient is still in A-fib however rate is improved in the low 100s.  Will plan for admission for atrial fibrillation and pneumonia.    Amount and/or Complexity of Data Reviewed  ECG/medicine tests: ordered and independent interpretation performed. Decision-making details documented in ED Course.     Details: EKG #1: Atrial fibrillation with rate of 129.  QTc 445.  Normal axis.  No STEMI pattern.  Artifact is present.    EKG #2: Atrial fibrillation with rate of 93.  PVCs present.  QTc 430.  Normal axis.  No STEMI pattern.        Procedure  Procedures     Debbie Pringle DO  Resident  06/07/24 5085

## 2024-06-07 NOTE — PROGRESS NOTES
"Physical Therapy Screen                 Therapy Communication Note    Patient Name: Raheel Cloud \"Raheel ASENCIO\"  MRN: [id number]  Today's Date: 6/7/2024     Discipline: Physical Therapy    Comment: Pt chart reviewed and spoke to pt in room. Per nurse charting, pt has been up ad scooby to bathroom. Pt confirms he has been up ad scooby to bathroom without LOB or difficulty. Pt reports he is IND at baseline without a device, no fall history. Pt wears supp O2 24/7 and has been to pulmonary rehab. Pt denies any acute care PT needs. Will dc PT order. Please re-order if there is a change in mobility status or as appropriate.  "

## 2024-06-07 NOTE — CONSULTS
Pulmonary Disease Consult    PATIENT NAME: Raheel Cloud    MRN: [id number]  SERVICE DATE:  6/7/2024   SERVICE TIME:  12:59 PM        PRIMARY CARE PHYSICIAN: Silvestre Rock DO  REASON FOR CONSULT: COPD exacerbation  REQUESTING PHYSICIAN: Tomer        ASSESSMENT :     Chronic hypoxic respiratory failure  Advanced COPD  Bronchiectasis with recent hospitalization and exam prolonged exacerbation    No acute osseous changes.  IMPRESSION:  Possible early developing infiltrate in the right lower lung.  Signed by Ricco White Dr. Pickering does not have subjective symptoms of significant flareup or exacerbation  He is still finishing the courses of prednisone at 20 mg daily after prolonged course of steroids since last discharge and admission to the hospital  Possible bronchiectasis exacerbation with mucus impaction, he is still on azithromycin every other day  Send a sputum sample for analysis, pending results  No worsening or increased oxygen requirement from baseline    Will reevaluate tomorrow clinically for possible early infiltrate or pneumonia versus recovering pneumonia of bronchiectasis from previous admission    Continue bronchodilators  Patient has his own vest device to help with exacerbation and mucus secretions  Oxygen evaluation  The patient is on his baseline medications and oxygen with no subjective evidence of pneumonia, can be discharged home if not today tomorrow  Pending sputum cultures will decide on the need for further antibiotics course or not      TYLER  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  He is also known with chronic hypoxic  respiratory failure on home oxygen 2 to 3 L/min, using vest regularly for sputum induction, and nebulizer therapy with DuoNeb and budesonide.  Unfortunately he lost his wife around 2 weeks ago after an acute critical illness.  He is also been under the management of atrial fibrillation    He was admitted after a new onset of uncontrolled A-fib yesterday and workup for cardiac arrhythmia and cardiac evaluation.  He had some swelling in the legs where he gave himself diuretics at home than atrial fibrillation kicked out of control    Otherwise he denies any worsening of shortness of breath or change in oxygen requirement.  He still having chronic cough with productive phlegm, otherwise no fever no chills no sore throat no nausea vomiting or weight loss  He is compliant with his inhalers nebulizer machine and the use of vest at home      PAST MEDICAL HISTORY:   Past Medical History:   Diagnosis Date    Personal history of other diseases of the respiratory system 06/23/2021    History of chronic obstructive lung disease     PAST SURGICAL HISTORY:   Past Surgical History:   Procedure Laterality Date    OTHER SURGICAL HISTORY  09/24/2020    Umbilical hernia repair laparoscopic    OTHER SURGICAL HISTORY  09/24/2020    Peritoneal adhesiolysis     FAMILY HISTORY: No family history on file.  SOCIAL HISTORY:   Social History     Tobacco Use    Smoking status: Every Day     Current packs/day: 2.00     Types: Cigarettes    Smokeless tobacco: Never   Vaping Use    Vaping status: Never Used   Substance Use Topics    Alcohol use: Not Currently    Drug use: Not Currently     CURRENT ALLERGIES:   Allergies as of 06/07/2024    (No Known Allergies)       MEDICATIONS:  Scheduled medications  budesonide, 1 mg, nebulization, BID  fluticasone, 2 spray, Each Nostril, BID  ipratropium-albuteroL, 3 mL, nebulization, q4h  methylPREDNISolone sodium succinate (PF), 40 mg, intravenous, Daily  montelukast, 10 mg, oral, Nightly  nebivolol, 2.5 mg,  oral, BID  nicotine, 1 patch, transdermal, Daily  rivaroxaban, 15 mg, oral, Daily with evening meal  tamsulosin, 0.4 mg, oral, Nightly      Continuous medications     PRN medications  PRN medications: benzocaine-menthol, dextromethorphan-guaifenesin, guaiFENesin, ipratropium-albuteroL, ondansetron ODT **OR** ondansetron, oxygen      ROS  Review of Systems      Physical Exam  Vitals reviewed.   Constitutional:       Appearance: Normal appearance.  He is comfortable at rest the pursing episodes on his oxygen baseline 3 L/min  HENT:      Head: Normocephalic and atraumatic.   Eyes:      Extraocular Movements: Extraocular movements intact.      Pupils: Pupils are equal, round, and reactive to light.   Cardiovascular:      Rate and Rhythm: Normal rate and regular rhythm.      Heart sounds: No murmur heard.  Pulmonary:      Effort: No significant respiratory distress present.      Breath sounds: No stridor.  No wheezing and rhonchi present.   Chest:      Chest wall: No tenderness.   Abdominal:      General: Bowel sounds are normal. There is no distension.      Palpations: There is no mass.      Tenderness: There is no abdominal tenderness. There is no rebound.   Musculoskeletal:         General: Mild bilateral leg swelling present. No tenderness or deformity.      Cervical back: No rigidity.   Lymphadenopathy:      Cervical: No cervical adenopathy.   Skin:     General: Skin is warm.      Coloration: Skin is pale. Skin is not jaundiced.      Findings: No bruising.   Neurological:      General: No focal deficit present.      Mental Status: She is alert and oriented to person, place, and time. Mental status is at baseline.      Cranial Nerves: No cranial nerve deficit.      Motor: Weakness present.   Psychiatric:         Mood and Affect: Mood normal.         Behavior: Behavior normal.     Patient Vitals for the past 24 hrs:   BP Temp Temp src Pulse Resp SpO2 Height Weight   06/07/24 1227 -- -- -- 76 24 -- -- --   06/07/24  "1226 108/55 36.6 °C (97.9 °F) Temporal 72 18 94 % -- --   06/07/24 0956 86/50 -- -- -- -- -- -- --   06/07/24 0955 95/53 -- -- -- -- -- -- --   06/07/24 0847 -- -- -- -- -- 94 % -- --   06/07/24 0846 -- -- -- -- -- 94 % -- --   06/07/24 0800 (!) 89/42 36.4 °C (97.5 °F) Temporal 90 18 95 % -- --   06/07/24 0555 99/58 35.9 °C (96.6 °F) Temporal 106 24 97 % 1.778 m (5' 10\") 69.5 kg (153 lb 3.2 oz)   06/07/24 0515 88/56 -- -- 97 18 100 % -- --   06/07/24 0500 -- -- -- 89 15 99 % -- --   06/07/24 0445 -- -- -- (!) 102 (!) 34 99 % -- --   06/07/24 0430 -- -- -- (!) 108 (!) 31 97 % -- --   06/07/24 0415 87/54 -- -- (!) 101 (!) 23 94 % -- --   06/07/24 0400 88/56 -- -- 94 17 97 % -- --   06/07/24 0345 93/50 -- -- (!) 101 (!) 24 97 % -- --   06/07/24 0330 (!) 66/46 -- -- (!) 108 (!) 25 97 % -- --   06/07/24 0315 93/64 -- -- (!) 101 (!) 21 98 % -- --   06/07/24 0311 -- -- -- (!) 104 17 98 % -- --   06/07/24 0300 109/53 -- -- (!) 102 17 98 % -- --   06/07/24 0245 106/53 -- -- (!) 123 20 98 % -- --   06/07/24 0230 95/57 -- -- (!) 119 19 97 % -- --   06/07/24 0215 87/50 -- -- (!) 112 (!) 31 96 % -- --   06/07/24 0200 100/59 -- -- (!) 125 (!) 33 97 % -- --   06/07/24 0145 107/58 -- -- (!) 116 (!) 21 97 % -- --   06/07/24 0142 114/63 -- -- (!) 123 20 97 % -- --   06/07/24 0130 97/53 -- -- (!) 118 (!) 22 97 % -- --   06/07/24 0115 (!) 133/101 -- -- (!) 151 (!) 23 95 % -- --   06/07/24 0110 (!) 133/101 36.4 °C (97.5 °F) Temporal (!) 156 (!) 24 94 % 1.778 m (5' 10\") 68 kg (150 lb)     Body mass index is 21.98 kg/m².      Gen: NAD  Neck: symmetric, no mass  Cardiovascular: RRR  Respiratory: No distress   GI: Abd soft, nontender, non-distended  Extremities: no leg edema  Skin: Warm and dry.  Neuro: alert and oriented times 3  : no carias     Labs:  Lab Results   Component Value Date    WBC 12.5 (H) 06/07/2024    HGB 13.0 (L) 06/07/2024    HCT 41.2 06/07/2024    MCV 93 06/07/2024     06/07/2024     Lab Results   Component Value " Date    GLUCOSE 116 (H) 06/07/2024    CALCIUM 9.4 06/07/2024     06/07/2024    K 4.5 06/07/2024    CO2 32 06/07/2024    CL 96 (L) 06/07/2024    BUN 21 06/07/2024    CREATININE 0.98 06/07/2024   ESR: --  Lab Results   Component Value Date    SEDRATE 11 05/05/2023     Lab Results   Component Value Date    CRP 11.87 (A) 06/10/2020     Lab Results   Component Value Date    ALT 26 06/07/2024    AST 13 06/07/2024    ALKPHOS 46 06/07/2024    BILITOT 0.7 06/07/2024   @ABG@      DATA:   Diagnostic tests reviewed for today's visit:    Labs this admission reviewed  Imagings this admission reviewed  Cultures: Reviewed    XR chest 1 view   Final Result   Possible early developing infiltrate in the right lower lung.   Signed by Ricco Mullins MD               Will continue to follow     Thank you so much for this consultation     Aracelis Velazquez MD

## 2024-06-07 NOTE — NURSING NOTE
Patient  took self  off  monitor  to go up to the  bathroom, returned to  bed  at 0911 and now in NSR.

## 2024-06-07 NOTE — CARE PLAN
Problem: Safety  Goal: Patient will be injury free during hospitalization  Outcome: Progressing  Goal: I will remain free of falls  Outcome: Progressing     Problem: Daily Care  Goal: Daily care needs are met  Outcome: Progressing     Problem: Psychosocial Needs  Goal: Demonstrates ability to cope with hospitalization/illness  Outcome: Progressing  Goal: Collaborate with me, my family, and caregiver to identify my specific goals  Outcome: Progressing     Problem: Discharge Barriers  Goal: My discharge needs are met  Outcome: Progressing     Problem: Skin  Goal: Participates in plan/prevention/treatment measures  Outcome: Progressing  Flowsheets (Taken 6/7/2024 1019)  Participates in plan/prevention/treatment measures: Increase activity/out of bed for meals  Goal: Prevent/minimize sheer/friction injuries  Outcome: Progressing  Flowsheets (Taken 6/7/2024 1019)  Prevent/minimize sheer/friction injuries: Increase activity/out of bed for meals   The patient's goals for the shift include      The clinical goals for the shift include Pt will remain HDS throughout shift    Over the shift, the patient did not make progress toward the following goals. Barriers to progression include none. Recommendations to address these barriers include monitor, meds.

## 2024-06-07 NOTE — PROGRESS NOTES
Attempted to meet with patient but sleeping very soundly. Will attempt to meet with patient at a later time.

## 2024-06-08 ENCOUNTER — HOSPITAL ENCOUNTER (INPATIENT)
Facility: HOSPITAL | Age: 74
End: 2024-06-08
Attending: INTERNAL MEDICINE | Admitting: INTERNAL MEDICINE
Payer: MEDICARE

## 2024-06-08 VITALS
WEIGHT: 156.4 LBS | DIASTOLIC BLOOD PRESSURE: 62 MMHG | SYSTOLIC BLOOD PRESSURE: 131 MMHG | TEMPERATURE: 96.8 F | HEART RATE: 78 BPM | OXYGEN SATURATION: 96 % | HEIGHT: 70 IN | RESPIRATION RATE: 22 BRPM | BODY MASS INDEX: 22.39 KG/M2

## 2024-06-08 PROBLEM — I48.91 ATRIAL FIBRILLATION WITH RAPID VENTRICULAR RESPONSE (MULTI): Status: RESOLVED | Noted: 2024-06-07 | Resolved: 2024-06-08

## 2024-06-08 LAB
ADENOVIRUS RVP, VIRC: NOT DETECTED
ALBUMIN SERPL BCP-MCNC: 3.4 G/DL (ref 3.4–5)
ANION GAP SERPL CALC-SCNC: 9 MMOL/L (ref 10–20)
BACTERIA SPEC RESP CULT: ABNORMAL
BASOPHILS # BLD AUTO: 0.02 X10*3/UL (ref 0–0.1)
BASOPHILS NFR BLD AUTO: 0.2 %
BUN SERPL-MCNC: 16 MG/DL (ref 6–23)
CALCIUM SERPL-MCNC: 8.6 MG/DL (ref 8.6–10.3)
CHLORIDE SERPL-SCNC: 102 MMOL/L (ref 98–107)
CO2 SERPL-SCNC: 36 MMOL/L (ref 21–32)
CREAT SERPL-MCNC: 0.88 MG/DL (ref 0.5–1.3)
EGFRCR SERPLBLD CKD-EPI 2021: >90 ML/MIN/1.73M*2
ENTEROVIRUS/RHINOVIRUS RVP, VIRC: NOT DETECTED
EOSINOPHIL # BLD AUTO: 0.01 X10*3/UL (ref 0–0.4)
EOSINOPHIL NFR BLD AUTO: 0.1 %
ERYTHROCYTE [DISTWIDTH] IN BLOOD BY AUTOMATED COUNT: 14.6 % (ref 11.5–14.5)
GLUCOSE SERPL-MCNC: 131 MG/DL (ref 74–99)
GRAM STN SPEC: ABNORMAL
HCT VFR BLD AUTO: 35.2 % (ref 41–52)
HGB BLD-MCNC: 10.7 G/DL (ref 13.5–17.5)
HUMAN BOCAVIRUS RVP, VIRC: NOT DETECTED
HUMAN CORONAVIRUS RVP, VIRC: NOT DETECTED
IMM GRANULOCYTES # BLD AUTO: 0.16 X10*3/UL (ref 0–0.5)
IMM GRANULOCYTES NFR BLD AUTO: 1.3 % (ref 0–0.9)
INFLUENZA A , VIRC: NOT DETECTED
INFLUENZA A H1N1-09 , VIRC: NOT DETECTED
INFLUENZA B PCR, VIRC: NOT DETECTED
LEGIONELLA AG UR QL: NEGATIVE
LYMPHOCYTES # BLD AUTO: 0.55 X10*3/UL (ref 0.8–3)
LYMPHOCYTES NFR BLD AUTO: 4.6 %
MAGNESIUM SERPL-MCNC: 2.11 MG/DL (ref 1.6–2.4)
MCH RBC QN AUTO: 28.7 PG (ref 26–34)
MCHC RBC AUTO-ENTMCNC: 30.4 G/DL (ref 32–36)
MCV RBC AUTO: 94 FL (ref 80–100)
METAPNEUMOVIRUS , VIRC: NOT DETECTED
MONOCYTES # BLD AUTO: 0.55 X10*3/UL (ref 0.05–0.8)
MONOCYTES NFR BLD AUTO: 4.6 %
NEUTROPHILS # BLD AUTO: 10.72 X10*3/UL (ref 1.6–5.5)
NEUTROPHILS NFR BLD AUTO: 89.2 %
NRBC BLD-RTO: 0 /100 WBCS (ref 0–0)
PARAINFLUENZA PCR, VIRC: NOT DETECTED
PHOSPHATE SERPL-MCNC: 3.4 MG/DL (ref 2.5–4.9)
PLATELET # BLD AUTO: 164 X10*3/UL (ref 150–450)
POTASSIUM SERPL-SCNC: 3.9 MMOL/L (ref 3.5–5.3)
RBC # BLD AUTO: 3.73 X10*6/UL (ref 4.5–5.9)
RSV PCR, RVP, VIRC: NOT DETECTED
S PNEUM AG UR QL: NEGATIVE
SODIUM SERPL-SCNC: 143 MMOL/L (ref 136–145)
WBC # BLD AUTO: 12 X10*3/UL (ref 4.4–11.3)

## 2024-06-08 PROCEDURE — 83735 ASSAY OF MAGNESIUM: CPT

## 2024-06-08 PROCEDURE — 80069 RENAL FUNCTION PANEL: CPT

## 2024-06-08 PROCEDURE — 2500000001 HC RX 250 WO HCPCS SELF ADMINISTERED DRUGS (ALT 637 FOR MEDICARE OP): Performed by: INTERNAL MEDICINE

## 2024-06-08 PROCEDURE — 2500000005 HC RX 250 GENERAL PHARMACY W/O HCPCS: Performed by: INTERNAL MEDICINE

## 2024-06-08 PROCEDURE — 2500000002 HC RX 250 W HCPCS SELF ADMINISTERED DRUGS (ALT 637 FOR MEDICARE OP, ALT 636 FOR OP/ED)

## 2024-06-08 PROCEDURE — 99222 1ST HOSP IP/OBS MODERATE 55: CPT | Performed by: INTERNAL MEDICINE

## 2024-06-08 PROCEDURE — 94640 AIRWAY INHALATION TREATMENT: CPT | Mod: MUE

## 2024-06-08 PROCEDURE — 85025 COMPLETE CBC W/AUTO DIFF WBC: CPT

## 2024-06-08 PROCEDURE — 2500000004 HC RX 250 GENERAL PHARMACY W/ HCPCS (ALT 636 FOR OP/ED)

## 2024-06-08 PROCEDURE — 36415 COLL VENOUS BLD VENIPUNCTURE: CPT

## 2024-06-08 PROCEDURE — S4991 NICOTINE PATCH NONLEGEND: HCPCS

## 2024-06-08 PROCEDURE — 2500000002 HC RX 250 W HCPCS SELF ADMINISTERED DRUGS (ALT 637 FOR MEDICARE OP, ALT 636 FOR OP/ED): Mod: MUE | Performed by: INTERNAL MEDICINE

## 2024-06-08 PROCEDURE — 99238 HOSP IP/OBS DSCHRG MGMT 30/<: CPT

## 2024-06-08 RX ORDER — DOXYCYCLINE 100 MG/1
100 CAPSULE ORAL 2 TIMES DAILY
Qty: 14 CAPSULE | Refills: 0 | Status: SHIPPED | OUTPATIENT
Start: 2024-06-08 | End: 2024-06-15

## 2024-06-08 RX ORDER — PREDNISONE 10 MG/1
10 TABLET ORAL DAILY
Qty: 30 TABLET | Refills: 0 | Status: SHIPPED | OUTPATIENT
Start: 2024-06-08 | End: 2024-06-10 | Stop reason: ALTCHOICE

## 2024-06-08 RX ORDER — METOPROLOL SUCCINATE 25 MG/1
25 TABLET, EXTENDED RELEASE ORAL DAILY
Status: DISCONTINUED | OUTPATIENT
Start: 2024-06-08 | End: 2024-06-08

## 2024-06-08 RX ADMIN — IPRATROPIUM BROMIDE AND ALBUTEROL SULFATE 3 ML: 2.5; .5 SOLUTION RESPIRATORY (INHALATION) at 04:17

## 2024-06-08 RX ADMIN — Medication 3 L/MIN: at 04:24

## 2024-06-08 RX ADMIN — IPRATROPIUM BROMIDE AND ALBUTEROL SULFATE 3 ML: 2.5; .5 SOLUTION RESPIRATORY (INHALATION) at 00:42

## 2024-06-08 RX ADMIN — IPRATROPIUM BROMIDE AND ALBUTEROL SULFATE 3 ML: 2.5; .5 SOLUTION RESPIRATORY (INHALATION) at 13:13

## 2024-06-08 RX ADMIN — IPRATROPIUM BROMIDE AND ALBUTEROL SULFATE 3 ML: 2.5; .5 SOLUTION RESPIRATORY (INHALATION) at 08:43

## 2024-06-08 RX ADMIN — BUDESONIDE 1 MG: 0.5 INHALANT RESPIRATORY (INHALATION) at 09:08

## 2024-06-08 RX ADMIN — METHYLPREDNISOLONE SODIUM SUCCINATE 40 MG: 40 INJECTION, POWDER, FOR SOLUTION INTRAMUSCULAR; INTRAVENOUS at 08:43

## 2024-06-08 RX ADMIN — FLUTICASONE PROPIONATE 2 SPRAY: 50 SPRAY, METERED NASAL at 08:49

## 2024-06-08 RX ADMIN — NICOTINE 1 PATCH: 14 PATCH, EXTENDED RELEASE TRANSDERMAL at 08:44

## 2024-06-08 RX ADMIN — NEBIVOLOL 2.5 MG: 2.5 TABLET ORAL at 08:43

## 2024-06-08 ASSESSMENT — ENCOUNTER SYMPTOMS
PALPITATIONS: 0
WHEEZING: 1
DYSPHORIC MOOD: 1
ARTHRALGIAS: 1
WEAKNESS: 1
SHORTNESS OF BREATH: 1
COUGH: 1
GASTROINTESTINAL NEGATIVE: 1
FATIGUE: 1

## 2024-06-08 ASSESSMENT — COGNITIVE AND FUNCTIONAL STATUS - GENERAL
DAILY ACTIVITIY SCORE: 24
MOBILITY SCORE: 24

## 2024-06-08 ASSESSMENT — PAIN - FUNCTIONAL ASSESSMENT
PAIN_FUNCTIONAL_ASSESSMENT: 0-10
PAIN_FUNCTIONAL_ASSESSMENT: 0-10

## 2024-06-08 ASSESSMENT — PAIN SCALES - GENERAL
PAINLEVEL_OUTOF10: 0 - NO PAIN

## 2024-06-08 NOTE — DISCHARGE SUMMARY
Discharge Diagnosis  Atrial fibrillation with rapid ventricular response (Multi)    Issues Requiring Follow-Up  Afib with rvr, follow up with Dr. Myles for possible medication adjustment    Discharge Meds     Your medication list        ASK your doctor about these medications        Instructions Last Dose Given Next Dose Due   ammonium lactate 12 % cream  Commonly known as: Amlactin           azithromycin 250 mg tablet  Commonly known as: Zithromax      Take 1 tablet (250 mg) by mouth every other day for 14 days.       azithromycin 250 mg tablet  Commonly known as: Zithromax           budesonide 1 mg/2 mL nebulizer solution  Commonly known as: Pulmicort           busPIRone 5 mg tablet  Commonly known as: Buspar      Take 1 tablet (5 mg) by mouth 2 times a day.       Flomax 0.4 mg 24 hr capsule  Generic drug: tamsulosin      Take 1 capsule (0.4 mg) by mouth once daily.       fluticasone 50 mcg/actuation nasal spray  Commonly known as: Flonase      Administer 2 sprays into each nostril 2 times a day. Shake gently. Before first use, prime pump. After use, clean tip and replace cap.       furosemide 20 mg tablet  Commonly known as: Lasix           Combivent Respimat  mcg/actuation inhaler  Generic drug: ipratropium-albuteroL           ipratropium-albuteroL 0.5-2.5 mg/3 mL nebulizer solution  Commonly known as: Duo-Neb           magnesium oxide 400 mg tablet  Commonly known as: Mag-Ox      Take 1 tablet (400 mg) by mouth once daily.       montelukast 10 mg tablet  Commonly known as: Singulair      Take 1 tablet (10 mg) by mouth once daily at bedtime.       nebivolol 2.5 mg tablet  Commonly known as: Bystolic      Take 1 tablet (2.5 mg) by mouth 2 times a day.       ondansetron 8 mg tablet  Commonly known as: Zofran      Take 1 tablet (8 mg) by mouth every 8 hours if needed for nausea or vomiting.       oxygen gas therapy  Commonly known as: O2           potassium chloride CR 20 mEq ER tablet  Commonly known as:  Klor-Con M20      Take 1 tablet (20 mEq) by mouth once daily. Do not crush or chew.       predniSONE 10 mg tablet  Commonly known as: Deltasone  Start taking on: May 20, 2024      Take 4 tablets (40 mg) by mouth once daily for 7 days, THEN 3 tablets (30 mg) once daily for 7 days, THEN 2 tablets (20 mg) once daily for 7 days, THEN 1 tablet (10 mg) once daily for 7 days.       rivaroxaban 15 mg tablet  Commonly known as: Xarelto      Take 1 tablet (15 mg) by mouth once daily in the evening. Take with meals. Take with food.       roflumilast 500 mcg tablet  Commonly known as: Daliresp           triamcinolone 0.1 % ointment  Commonly known as: Kenalog           Ventolin HFA 90 mcg/actuation inhaler  Generic drug: albuterol      Inhale 2 puffs every 4 hours if needed for wheezing.                Test Results Pending At Discharge  Pending Labs       Order Current Status    Respiratory Viral Panel In process    Blood Culture Preliminary result    Blood Culture Preliminary result            Hospital Course   Mr. Raheel Cloud is a 73 y.o. male who presents with shortness of breath or palpitations.  Found to have A-fib with RVR with borderline blood pressure.  Physical examination remarkable for diffuse rhonchi bilaterally with pitting edema lower extremities.     ED course:  V/S: /101, , RR 24, temperature 36.4 (97.5), SpO2 94% on 3 L NC.  Labs: CBC did show WBC 12.5, H/H13.0/41.1, platelet 202.  CMP did show sodium 137, potassium 4.5, BUNs/creatinine 21/0.98.  .  Troponin (516), lactate 1.6.  EKG: A-fib with RVR with no ST/T wave changes or ischemic pattern.  QTc 430 ms.  Imaging:  -CXR did show patchy infiltrates in the right lower lung zone (a bit worse than x-ray 2 weeks ago)  Intervention:  -He was started on Cardizem drip.  Shortly after his blood pressure drops down to 85/57.     Patient initially started on Cardizem drip but then discontinued.  Differential to triggering factors for the A-fib  includes: Dehydration/volume down 2/2 Lasix and diarrhea versus infection versus COPD exacerbation versus ischemia infarction (ruled out with normal troponin and EKG) versus idiopathic.  Pt converted fluid bolus. Pulmonology was consulted and pt was deemed appropriate for discharge home with continuing his medications of azithromycin every other day and bronchodilators. EP was consulted by cardiology for eval prior to discharge. Pt stable for homegoing with follow up.    Pertinent Physical Exam At Time of Discharge  Physical Exam  Constitutional:       Appearance: Normal appearance.   HENT:      Head: Normocephalic.      Right Ear: External ear normal.      Left Ear: External ear normal.      Nose: Nose normal.   Eyes:      Extraocular Movements: Extraocular movements intact.      Conjunctiva/sclera: Conjunctivae normal.   Cardiovascular:      Rate and Rhythm: Normal rate and regular rhythm.   Pulmonary:      Effort: Pulmonary effort is normal.      Breath sounds: Normal breath sounds.   Abdominal:      General: Abdomen is flat. There is no distension.      Tenderness: There is no abdominal tenderness.   Musculoskeletal:         General: Swelling present. Normal range of motion.      Cervical back: Normal range of motion.      Right lower leg: Edema present.      Left lower leg: Edema present.   Neurological:      General: No focal deficit present.      Mental Status: He is alert and oriented to person, place, and time.   Psychiatric:         Mood and Affect: Mood normal.         Behavior: Behavior normal.         Outpatient Follow-Up  Future Appointments   Date Time Provider Department Center   6/10/2024  1:40 PM Silvestre Rock DO [id number] [place]   6/20/2024  3:30 PM Paul Ibrahim MD [id number] [place]   7/18/2024  2:00 PM PAR CT 1 [facility]   8/2/2024 11:40 AM Sedrick Myles MD [id number] [place]     Please follow up with your primary care provider within 7 days for hospital follow up.   Please call  to make this appointment.   Please take your medications as prescribed.   If you have any new or worsening symptoms seek medical attention.   Thank you for allowing us to participate in your care!   -[facility] Inpatient Medicine Teaching Service.      Chandu Echeverria,

## 2024-06-08 NOTE — CONSULTS
"CARDIOLOGY/ELECTROPHYSIOLOGY CONSULTATION    PATIENT NAME: Raheel Cloud  PATIENT MRN: [id number]  SERVICE DATE:  6/8/2024  SERVICE TIME: 1:04 PM    CONSULTANT: Sedrick Myles MD  PRIMARY CARE PHYSICIAN: Silvestre Rock DO  ATTENDING PHYSICIAN: Kevin Israle MD      IMPRESSIONS:  1.  Chronic obstructive pulmonary disease, advanced, followed by Dr. Aracelis Velazquez.  The patient continues on steroids for his recent exacerbation, and is on empiric antibiotics for possible pneumonia as well.  2.  Paroxysmal atrial fibrillation, well-documented on numerous occasions over the past few years, and always converting spontaneously to sinus rhythm.  The etiology of this is likely the patient's advanced lung disease.  He has thus far been on a simple \"rate control strategy\" with Bystolic.  He has a [id number] score of at least 2 (age over 65, presumed vascular disease) and is appropriately anticoagulated with Xarelto.  Given his symptoms with atrial fibrillation, he is likely warrants a \"rhythm control strategy.\"  3.  Possible occult coronary disease, suggested by septal wall motion abnormality even though the overall LVEF is normal  4.  Hyperlipidemia, on dietary management.  5.  Urologic problems, with benign prostatic hypertrophy and remote prostate cancer, followed by Dr. Grady Aguilar.  6.  Lower extremity edema, likely referable to recent prednisone therapy rather than significant right heart failure.    RECOMMENDATIONS:  1.  The patient may continue his Bystolic and Xarelto for now.  2.  He is free for discharge at any time, from my standpoint.  3.  I spoke with the patient about future treatment options.  Given his presumed coronary disease, flecainide and propafenone are contraindicated.  Given his lung disease, I would also avoid sotalol and amiodarone.  That basically leaves dronedarone and dofetilide as the major antiarrhythmic therapy options.  I favor the latter because of its greater " potency.  The patient will plan to call the office to report on when he would be willing to undergo elective admission for initiation of dofetilide therapy at [facility], hopefully sometime in the next 1 to 2 months.  4.  If sinus rhythm cannot be maintained even on dofetilide, the patient may need to be considered for pulmonary vein isolation (likely high risk given his advanced lung disease) or even for simple AV junction ablation and permanent pacemaker placement.    Thank you for this consultation.      SIGNATURE: Sedrick Myles MD   OFFICE: [phone]    ================================================================    REASON FOR CONSULTATION: Paroxysmal atrial fibrillation    HISTORY: Raheel Cloud is a 73 y.o. white male, followed primarily by Dr. Silvestre Rock, who presented on 2024 because of another episode of palpitations that began on the night of 2024.  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  The patient has a history of advanced chronic obstructive pulmonary disease and was first found to have paroxysmal atrial fibrillation roughly a year and a half ago.  He converted spontaneously to sinus rhythm and was seen by Dr. Bebeto Landa, who started him on Bystolic therapy along with Xarelto anticoagulation.  The patient did well until the spring 2024.  His wife unfortunately  in 2024, I believe, and the patient was hospitalized for roughly 10 days in early May 2024 with an exacerbation of COPD.  He was in sinus rhythm during that stay.  However, he presented to the hospital on 2024 in atrial fibrillation with a rapid ventricular response as the cause of palpitations, confirmed on his home [employer] device, converting back to sinus rhythm within a few hours.  He was discharged home but had a recurrence on 2024, this time lasting for roughly 12 hours.   He was seen in consultation by Dr. Zachary Randolph, who changed his bisoprolol to twice daily dosing, and recommended electrophysiology input.  The patient is actually scheduled to see me electively in the office in August 2024.  The patient had recurrent atrial fibrillation on 6/6/2024 and came back to the hospital, and states that this particular episode resolved after roughly 9 hours.  A long-term treatment strategy was requested by the primary team.    PAST MEDICAL HISTORY: The patient has a history of advanced chronic obstructive pulmonary disease with bronchiectasis (on continuous home oxygen at 2 to 3 L/min), hyperlipidemia, benign prostatic hypertrophy, and remote prostate cancer.  He is not known to have coronary disease, with a negative stress echocardiogram in October 2022, and has normal left ventricular systolic function.  He has had well-documented paroxysmal atrial fibrillation recently, as noted above.    PAST SURGICAL HISTORY: Remarkable for umbilical herniorrhaphy, and radium implants for prostate cancer    OUTPATIENT MEDICATIONS:  No current facility-administered medications on file prior to encounter.     Current Outpatient Medications on File Prior to Encounter   Medication Sig    ammonium lactate (Amlactin) 12 % cream Apply 1 Application topically 2 times a day as needed for dry skin.    azithromycin (Zithromax) 250 mg tablet Take 1 tablet (250 mg) by mouth every other day.    budesonide (Pulmicort) 1 mg/2 mL nebulizer solution Take 2 mL (1 mg) by nebulization 2 times a day. Rinse mouth with water after use to reduce aftertaste and incidence of candidiasis. Do not swallow.    Flomax 0.4 mg 24 hr capsule Take 1 capsule (0.4 mg) by mouth once daily. (Patient taking differently: Take 1 capsule (0.4 mg) by mouth once daily at bedtime.)    fluticasone (Flonase) 50 mcg/actuation nasal spray Administer 2 sprays into each nostril 2 times a day. Shake gently. Before first use, prime pump. After use,  clean tip and replace cap.    furosemide (Lasix) 20 mg tablet Take 1 tablet (20 mg) by mouth once daily.    ipratropium-albuteroL (Combivent Respimat)  mcg/actuation inhaler Inhale 1 puff every 4 hours if needed for wheezing.    ipratropium-albuteroL (Duo-Neb) 0.5-2.5 mg/3 mL nebulizer solution Inhale 3 mL 4 times a day as needed for wheezing or shortness of breath.    magnesium oxide (Mag-Ox) 400 mg tablet Take 1 tablet (400 mg) by mouth once daily.    montelukast (Singulair) 10 mg tablet Take 1 tablet (10 mg) by mouth once daily at bedtime.    nebivolol (Bystolic) 2.5 mg tablet Take 1 tablet (2.5 mg) by mouth 2 times a day.    ondansetron (Zofran) 8 mg tablet Take 1 tablet (8 mg) by mouth every 8 hours if needed for nausea or vomiting.    potassium chloride CR (Klor-Con M20) 20 mEq ER tablet Take 1 tablet (20 mEq) by mouth once daily. Do not crush or chew.    predniSONE (Deltasone) 10 mg tablet Take 4 tablets (40 mg) by mouth once daily for 7 days, THEN 3 tablets (30 mg) once daily for 7 days, THEN 2 tablets (20 mg) once daily for 7 days, THEN 1 tablet (10 mg) once daily for 7 days. (Patient taking differently: Patient is taking it 2 tablets (20mg) once daily)    rivaroxaban (Xarelto) 15 mg tablet Take 1 tablet (15 mg) by mouth once daily in the evening. Take with meals. Take with food.    roflumilast (Daliresp) 500 mcg tablet Take 1 tablet (500 mcg) by mouth once daily.    triamcinolone (Kenalog) 0.1 % ointment Apply 1 Application topically 2 times a day as needed.    Ventolin HFA 90 mcg/actuation inhaler Inhale 2 puffs every 4 hours if needed for wheezing.    azithromycin (Zithromax) 250 mg tablet Take 1 tablet (250 mg) by mouth every other day for 14 days.    busPIRone (Buspar) 5 mg tablet Take 1 tablet (5 mg) by mouth 2 times a day. (Patient not taking: Reported on 6/7/2024)    oxygen (O2) gas therapy Inhale 3 L/min continuously. Indications: hypoxia     ALLERGIES AND DRUG INTOLERANCES: No Known  "Allergies    FAMILY HISTORY: Not contributory    SOCIAL HISTORY: The patient was recently , as his wife  of aspiration pneumonia following abdominal surgery from an obstruction that was related to her chronic scleroderma.  The patient has been a 2 pack/day smoker for the better part of 50 years, with a 100-pack-year history.  He does not drink alcohol.  He is retired from a job as an emergency department physician, having been in the  at [employer] [facility] [employer] in the past, and having worked at [employer] in [place] and [employer] in [place].    COMPLETE REVIEW OF SYSTEMS:  GENERAL: Negative for weight gain or loss  HEENT: Negative for vision or hearing problems  RESPIRATORY: Positive for dyspnea with minimal exertion; negative for hemoptysis  CARDIAC: Negative for exertional chest discomfort; positive for intermittent palpitations  VASCULAR: Negative for claudication; positive for recent prominent lower extremity edema related to prednisone use  GI: Negative for nausea, vomiting, hematemesis, melena, or hematochezia  MUSCULOSKELETAL: Negative for major arthralgias  ENDOCRINE: Negative for heat or cold intolerance, polyuria or polydipsia  HEMATOLOGIC: Negative for bleeding problems  CUTANEOUS: Negative for rashes  NEURO: Negative for seizures; negative for focal neurologic symptoms; negative for near-syncope or syncope    PHYSICAL EXAM:  /58   Pulse 76   Temp 36 °C (96.8 °F)   Resp (!) 27   Ht 1.778 m (5' 10\")   Wt 70.9 kg (156 lb 6.4 oz)   SpO2 96%   BMI 22.44 kg/m²   Gen: Pleasant white male who appears dyspneic at rest, with pursed lip breathing; alert and oriented x 3  HEENT: Unremarkable; wearing supplemental oxygen by nasal cannula  Neck: No jugular venous distention noted  Cardiac: Regular rhythm with occasional extrasystoles but no murmurs, rubs, or gallops  Resp: Diminished breath sounds throughout, with expiratory rhonchi " in both bases  Abd: Non-distended, with no tenderness, no masses, and no organomegaly noted  Ext: Peripheral pulses intact; 2+ edema below knees bilaterally  Neuro: Normal gross motor and sensory function; no focal deficits noted  Skin: No lesions noted    EKG's: Tracings on 6/7/2024 at 01:16 and 03:00 showed atrial fibrillation with a ventricular response in the 90s to 120s, with a vertical axis but otherwise normal QRS complexes    TELEMETRY: Converted back to sinus rhythm on 6/7/2024; maintaining sinus rhythm with frequent premature atrial complexes    RECENT ECHOCARDIOGRAM (5/2024): LVEF 55% with some diastolic left ventricular dysfunction, normal right and left atrial size, and mild hypokinesis of the inferior and anterior septal areas    CHEST X-RAY: Hyperinflated lungs; possible minor right lower lobe infiltrate    LABS:  Lab Results   Component Value Date    GLUCOSE 131 (H) 06/08/2024    CALCIUM 8.6 06/08/2024     06/08/2024    K 3.9 06/08/2024    CO2 36 (H) 06/08/2024     06/08/2024    BUN 16 06/08/2024    CREATININE 0.88 06/08/2024      Lab Results   Component Value Date    WBC 12.0 (H) 06/08/2024    HGB 10.7 (L) 06/08/2024    HCT 35.2 (L) 06/08/2024    MCV 94 06/08/2024     06/08/2024

## 2024-06-08 NOTE — CARE PLAN
Problem: Safety  Goal: Patient will be injury free during hospitalization  Outcome: Met  Goal: I will remain free of falls  Outcome: Met     Problem: Daily Care  Goal: Daily care needs are met  Outcome: Met     Problem: Psychosocial Needs  Goal: Demonstrates ability to cope with hospitalization/illness  Outcome: Met  Goal: Collaborate with me, my family, and caregiver to identify my specific goals  Outcome: Met     Problem: Discharge Barriers  Goal: My discharge needs are met  Outcome: Met     Problem: Skin  Goal: Participates in plan/prevention/treatment measures  Outcome: Met  Goal: Prevent/minimize sheer/friction injuries  Outcome: Met   The patient's goals for the shift include      The clinical goals for the shift include Stable VS and pulse ox    Over the shift, the patient did not make progress toward the following goals. Barriers to progression include none. Recommendations to address these barriers include discharge.

## 2024-06-08 NOTE — CONSULTS
Inpatient consult to Cardiology  Consult performed by: Bertha Schumacher MD  Consult ordered by: Kevin Israel MD  Reason for consult: a fib          Cardiology Consult Note      Date:   6/8/2024  Patient name:  Raheel Cloud  Date of admission:  6/7/2024  1:10 AM  MRN:   [id number]  YOB: 1950  Time of Consult:  9:43 AM    Consulting Cardiologist: Dr. Bertha Schumacher        Primary Cardiologist:   Dr. Paul Ibrahim     Referring Provider: Dr. Israel      Admission Diagnosis:     Atrial fibrillation with rapid ventricular response (Multi)    History of Present Illness:      Raheel Cloud is a 73 y.o.  male patient who is being at the request of Dr. Israel for inpatient consultation of arrhythmia. He was admitted on 6/7/2024.  Previous NO and EHR records have been reviewed in detail.      Patient seen and examined, chart reviewed.  This is a 73-year-old gentleman with severe COPD and frequent recent decompensations.  He was diagnosed with atrial fibrillation 2 years ago and was treated with nebivolol that seem to help the patient for a time.  Now with progressive worsening of his pulmonary status he has had multiple episodes of atrial fibrillation with rapid ventricular response and prolonged periods of time before he is converted anywhere from 5 hours to 9 hours.  He feels unwell when he has the episodes of A-fib.  He does not feel the rapid heart rate in his chest but he feels a feeling in the back of his throat and he feels weak during the A-fib episodes.  The fact that they are lasting so much longer and coming so much more frequent is disturbing to the patient.  He has an appointment to see electrophysiology later this summer and has not yet seen an electrophysiologist for treatment.  He recently saw his general cardiologist on an outpatient basis where there was a discussion of changing his nebivolol to metoprolol succinate for better rate control.  He has never been  tried on a rhythm medication.  He has converted with time after the use of IV Cardizem when he presents to the hospital with A-fib and rapid ventricular response.    He has no documented history of coronary artery disease or previous myocardial infarction or LV dysfunction.  He had moderate coronary calcifications on his CT scan and there is plans to perform coronary CT angiography to evaluate for any significant coronary artery disease given his shortness of breath and the possibility of some of the symptoms being an anginal equivalent.  He has not yet had the CT coronary angiography requested by his outpatient cardiologist.    Other medical history social history surgical history has been reviewed.    Allergies:     No Known Allergies    Past Medical History:     Past Medical History:   Diagnosis Date    Personal history of other diseases of the respiratory system 06/23/2021    History of chronic obstructive lung disease       Past Surgical History:     Past Surgical History:   Procedure Laterality Date    OTHER SURGICAL HISTORY  09/24/2020    Umbilical hernia repair laparoscopic    OTHER SURGICAL HISTORY  09/24/2020    Peritoneal adhesiolysis       Family History:     No family history on file.    Social History:     Social History     Socioeconomic History    Marital status:      Spouse name: None    Number of children: None    Years of education: None    Highest education level: None   Occupational History    None   Tobacco Use    Smoking status: Every Day     Current packs/day: 2.00     Types: Cigarettes    Smokeless tobacco: Never   Vaping Use    Vaping status: Never Used   Substance and Sexual Activity    Alcohol use: Not Currently    Drug use: Not Currently    Sexual activity: Not Currently   Other Topics Concern    None   Social History Narrative    None     Social Determinants of Health     Financial Resource Strain: Low Risk  (6/7/2024)    Overall Financial Resource Strain (CARDIA)     Difficulty  of Paying Living Expenses: Not hard at all   Food Insecurity: Not on file   Transportation Needs: No Transportation Needs (6/7/2024)    PRAPARE - Transportation     Lack of Transportation (Medical): No     Lack of Transportation (Non-Medical): No   Physical Activity: Not on file   Stress: Not on file   Social Connections: Feeling Socially Integrated (5/24/2024)    OASIS : Social Isolation     Frequency of experiencing loneliness or isolation: Never   Intimate Partner Violence: Not on file   Housing Stability: Low Risk  (6/7/2024)    Housing Stability Vital Sign     Unable to Pay for Housing in the Last Year: No     Number of Places Lived in the Last Year: 1     Unstable Housing in the Last Year: No       Home Medications:     Prior to Admission medications    Medication Sig Start Date End Date Taking? Authorizing Provider   ammonium lactate (Amlactin) 12 % cream Apply 1 Application topically 2 times a day as needed for dry skin.   Yes Historical Provider, MD   azithromycin (Zithromax) 250 mg tablet Take 1 tablet (250 mg) by mouth every other day. 5/24/24  Yes Historical Provider, MD   budesonide (Pulmicort) 1 mg/2 mL nebulizer solution Take 2 mL (1 mg) by nebulization 2 times a day. Rinse mouth with water after use to reduce aftertaste and incidence of candidiasis. Do not swallow.   Yes Historical Provider, MD   Flomax 0.4 mg 24 hr capsule Take 1 capsule (0.4 mg) by mouth once daily.  Patient taking differently: Take 1 capsule (0.4 mg) by mouth once daily at bedtime. 1/30/24 1/29/25 Yes Silvestre Rock,    fluticasone (Flonase) 50 mcg/actuation nasal spray Administer 2 sprays into each nostril 2 times a day. Shake gently. Before first use, prime pump. After use, clean tip and replace cap. 5/12/24  Yes Kevin Israel MD   furosemide (Lasix) 20 mg tablet Take 1 tablet (20 mg) by mouth once daily.   Yes Historical Provider, MD   ipratropium-albuteroL (Combivent Respimat)  mcg/actuation inhaler  Inhale 1 puff every 4 hours if needed for wheezing.   Yes Historical Provider, MD   ipratropium-albuteroL (Duo-Neb) 0.5-2.5 mg/3 mL nebulizer solution Inhale 3 mL 4 times a day as needed for wheezing or shortness of breath. 5/5/23  Yes Historical Provider, MD   magnesium oxide (Mag-Ox) 400 mg tablet Take 1 tablet (400 mg) by mouth once daily. 5/27/24 6/26/24 Yes Kevin Israel MD   montelukast (Singulair) 10 mg tablet Take 1 tablet (10 mg) by mouth once daily at bedtime. 5/20/24 11/16/24 Yes Silvestre Rock, DO   nebivolol (Bystolic) 2.5 mg tablet Take 1 tablet (2.5 mg) by mouth 2 times a day. 5/27/24  Yes Kevin Israel MD   ondansetron (Zofran) 8 mg tablet Take 1 tablet (8 mg) by mouth every 8 hours if needed for nausea or vomiting. 5/12/24  Yes Kevin Israel MD   potassium chloride CR (Klor-Con M20) 20 mEq ER tablet Take 1 tablet (20 mEq) by mouth once daily. Do not crush or chew. 5/27/24 6/26/24 Yes Kevin Israel MD   predniSONE (Deltasone) 10 mg tablet Take 4 tablets (40 mg) by mouth once daily for 7 days, THEN 3 tablets (30 mg) once daily for 7 days, THEN 2 tablets (20 mg) once daily for 7 days, THEN 1 tablet (10 mg) once daily for 7 days.  Patient taking differently: Patient is taking it 2 tablets (20mg) once daily 5/20/24 6/16/24 Yes Silvestre Rock,    rivaroxaban (Xarelto) 15 mg tablet Take 1 tablet (15 mg) by mouth once daily in the evening. Take with meals. Take with food. 10/10/23 10/9/24 Yes Paul Ibrahim MD   roflumilast (Daliresp) 500 mcg tablet Take 1 tablet (500 mcg) by mouth once daily. 4/29/24  Yes Historical Provider, MD   triamcinolone (Kenalog) 0.1 % ointment Apply 1 Application topically 2 times a day as needed.   Yes Historical Provider, MD   Ventolin HFA 90 mcg/actuation inhaler Inhale 2 puffs every 4 hours if needed for wheezing. 1/18/24 1/17/25 Yes Silvestre Rock, DO   azithromycin (Zithromax) 250 mg tablet Take 1 tablet (250 mg) by mouth  every other day for 14 days. 5/12/24   Kevin Israel MD   busPIRone (Buspar) 5 mg tablet Take 1 tablet (5 mg) by mouth 2 times a day.  Patient not taking: Reported on 6/7/2024 5/27/24   Kevin Israel MD   oxygen (O2) gas therapy Inhale 3 L/min continuously. Indications: hypoxia    Historical Provider, MD       Current Medications:     Current Outpatient Medications   Medication Instructions    ammonium lactate (Amlactin) 12 % cream 1 Application, Topical, 2 times daily PRN    azithromycin (ZITHROMAX) 250 mg, oral, Every other day    azithromycin (ZITHROMAX) 250 mg, oral, Every other day    budesonide (PULMICORT) 1 mg, nebulization, 2 times daily RT, Rinse mouth with water after use to reduce aftertaste and incidence of candidiasis. Do not swallow.    busPIRone (BUSPAR) 5 mg, oral, 2 times daily    Flomax 0.4 mg, oral, Daily    fluticasone (Flonase) 50 mcg/actuation nasal spray 2 sprays, Each Nostril, 2 times daily, Shake gently. Before first use, prime pump. After use, clean tip and replace cap.    furosemide (LASIX) 20 mg, oral, Daily    ipratropium-albuteroL (Combivent Respimat)  mcg/actuation inhaler 1 puff, inhalation, Every 4 hours PRN    ipratropium-albuteroL (Duo-Neb) 0.5-2.5 mg/3 mL nebulizer solution 3 mL, inhalation, 4 times daily PRN    magnesium oxide (MAG-OX) 400 mg, oral, Daily    montelukast (SINGULAIR) 10 mg, oral, Nightly    nebivolol (BYSTOLIC) 2.5 mg, oral, 2 times daily    ondansetron (ZOFRAN) 8 mg, oral, Every 8 hours PRN    oxygen (O2) 3 L/min, inhalation, Continuous    potassium chloride CR (Klor-Con M20) 20 mEq ER tablet 20 mEq, oral, Daily, Do not crush or chew.    predniSONE (Deltasone) 10 mg tablet Take 4 tablets (40 mg) by mouth once daily for 7 days, THEN 3 tablets (30 mg) once daily for 7 days, THEN 2 tablets (20 mg) once daily for 7 days, THEN 1 tablet (10 mg) once daily for 7 days.    rivaroxaban (XARELTO) 15 mg, oral, Daily with evening meal, Take with food.     roflumilast (DALIRESP) 500 mcg, oral, Daily    triamcinolone (Kenalog) 0.1 % ointment 1 Application, Topical, 2 times daily PRN    Ventolin HFA 90 mcg/actuation inhaler 2 puffs, inhalation, Every 4 hours PRN        IV Medications:          Review of Systems:      Review of Systems   Constitutional:  Positive for fatigue.   HENT:  Positive for congestion.    Respiratory:  Positive for cough, shortness of breath and wheezing.    Cardiovascular:  Negative for chest pain, palpitations and leg swelling.   Gastrointestinal: Negative.    Musculoskeletal:  Positive for arthralgias.   Neurological:  Positive for weakness.   Psychiatric/Behavioral:  Positive for dysphoric mood (grief reaction from death of spouse several months ago.).    All other systems reviewed and are negative.      Vital Signs:     Vitals:    06/08/24 0007 06/08/24 0355 06/08/24 0424 06/08/24 0620   BP: 123/58 127/58     BP Location:       Patient Position:       Pulse: 64 76     Resp: 15 (!) 27     Temp: 36.2 °C (97.2 °F) 36 °C (96.8 °F)     TempSrc:       SpO2: 95% 92% 96%    Weight:    70.9 kg (156 lb 6.4 oz)   Height:           Intake/Output Summary (Last 24 hours) at 6/8/2024 0943  Last data filed at 6/7/2024 1950  Gross per 24 hour   Intake 1993.25 ml   Output 650 ml   Net 1343.25 ml     Vitals:    06/08/24 0620   Weight: 70.9 kg (156 lb 6.4 oz)       Physical Examination:     GENERAL APPEARANCE: Well developed, well nourished, in no acute distress.  HEENT: No gross abnormalities of conjunctiva, teeth, gums, oral mucosa  NECK: no JVD, no bruit. Thyroid not palpable. Carotid upstrokes normal.  CHEST: Symmetric and non-tender.  LUNGS: Mild tachypnea. Moist cough. Diminished air entry, particularly at bases with prolonged exp phase without wheeze, heavy coarse rhonchi.   HEART: Rate and rhythm regular with no evident murmur; no gallop appreciated. There are no rubs, clicks or heaves. PMI nondisplaced.  ABDOMEN: Soft, nontender, no palpable  "hepatosplenomegaly, no mases, no bruits. MUSCULOSKELETAL: Mild OA changes  EXTREMITIES: Warm with good color, no clubbing or cyanois. There is no edema noted.  PERIPHERAL VASCULAR: Pulses present and equally palpable; 2+ throughout. No femoral bruits.  NEURO/PSHCY: Alert and oriented x3; appropriate behavior and responses and responses, no focal deficits.   INTEGUMENT: Skin warm and dry, without gross excoriationis or lesions.    Lab:     CBC:   Lab Results   Component Value Date    WBC 12.0 (H) 06/08/2024    RBC 3.73 (L) 06/08/2024    HGB 10.7 (L) 06/08/2024    HCT 35.2 (L) 06/08/2024     06/08/2024        CMP:    Lab Results   Component Value Date     06/08/2024    K 3.9 06/08/2024     06/08/2024    CO2 36 (H) 06/08/2024    BUN 16 06/08/2024    CREATININE 0.88 06/08/2024    GLUCOSE 131 (H) 06/08/2024    CALCIUM 8.6 06/08/2024       Magnesium:    Lab Results   Component Value Date    MG 2.11 06/08/2024       Lipid Profile:    No results found for: \"CHLPL\", \"TRIG\", \"HDL\", \"LDLCALC\", \"LDLDIRECT\"    TSH:    Lab Results   Component Value Date    TSH 1.20 06/07/2024       BNP:   Lab Results   Component Value Date     (H) 06/07/2024        PT/INR:    No results found for: \"PROTIME\", \"INR\"    HgBA1c:    Lab Results   Component Value Date    HGBA1C 5.7 (H) 04/17/2024       BMP:  Lab Results   Component Value Date     06/08/2024     06/07/2024    K 3.9 06/08/2024    K 4.5 06/07/2024     06/08/2024    CL 96 (L) 06/07/2024    CO2 36 (H) 06/08/2024    CO2 32 06/07/2024    BUN 16 06/08/2024    BUN 21 06/07/2024    CREATININE 0.88 06/08/2024    CREATININE 0.98 06/07/2024       CBC:  Lab Results   Component Value Date    WBC 12.0 (H) 06/08/2024    WBC 12.5 (H) 06/07/2024    RBC 3.73 (L) 06/08/2024    RBC 4.45 (L) 06/07/2024    HGB 10.7 (L) 06/08/2024    HGB 13.0 (L) 06/07/2024    HCT 35.2 (L) 06/08/2024    HCT 41.2 06/07/2024    MCV 94 06/08/2024    MCV 93 06/07/2024    MCH 28.7 " 06/08/2024    MCH 29.2 06/07/2024    MCHC 30.4 (L) 06/08/2024    MCHC 31.6 (L) 06/07/2024    RDW 14.6 (H) 06/08/2024    RDW 14.7 (H) 06/07/2024     06/08/2024     06/07/2024       Cardiac Enzymes:    Lab Results   Component Value Date    TROPHS 6 06/07/2024    TROPHS 5 06/07/2024    TROPHS 7 05/27/2024       Hepatic Function Panel:    Lab Results   Component Value Date    ALKPHOS 46 06/07/2024    ALT 26 06/07/2024    AST 13 06/07/2024    PROT 6.7 06/07/2024    BILITOT 0.7 06/07/2024         Diagnostic Studies:     ECG 12 lead    Result Date: 6/7/2024  Atrial fibrillation with premature ventricular or aberrantly conducted complexes Septal infarct , age undetermined Abnormal ECG When compared with ECG of 07-JUN-2024 01:16, (unconfirmed) Septal infarct is now Present ST elevation now present in Inferior leads Nonspecific T wave abnormality no longer evident in Inferior leads    ECG 12 lead    Result Date: 6/7/2024  Atrial fibrillation with rapid ventricular response with premature ventricular or aberrantly conducted complexes Abnormal ECG When compared with ECG of 27-MAY-2024 12:20, Atrial fibrillation has replaced Sinus rhythm Vent. rate has increased BY  60 BPM Non-specific change in ST segment in Inferior leads    XR chest 1 view    Result Date: 6/7/2024  STUDY: Chest Radiograph;  6/7/2024 at 1:47 AM INDICATION: Chest pain. COMPARISON: XR chest 5/27/2024, XR chest 5/24/2024, XR chest 5/4/2024, XR chest 5/21/2023. ACCESSION NUMBER(S): [id number] ORDERING CLINICIAN: MARU GREGG TECHNIQUE:  Frontal chest was obtained at 0147 hours. FINDINGS: CARDIOMEDIASTINAL SILHOUETTE: Cardiomediastinal silhouette is normal in size and configuration.  LUNGS: Subtle patchy opacities in the right lower lung may represent developing infiltrate.  No distinct lobar consolidation.  No overt edema.  No pleural effusion or pneumothorax.  ABDOMEN: No remarkable upper abdominal findings.  BONES: No acute osseous  changes.    Possible early developing infiltrate in the right lower lung. Signed by Ricco Mullins MD      Radiology:     XR chest 1 view   Final Result   Possible early developing infiltrate in the right lower lung.   Signed by Ricco Mullins MD          Assessment:     Problem List Items Addressed This Visit       * (Principal) Atrial fibrillation with rapid ventricular response (Multi) - Primary    Relevant Medications    metoprolol succinate XL (Toprol-XL) 24 hr tablet 25 mg (Start on 6/8/2024 10:00 AM)     Other Visit Diagnoses       Pneumonia of right lower lobe due to infectious organism                Patient Active Problem List   Diagnosis    Prostate cancer (Multi)    Angina pectoris (CMS-HCC)    BPH (benign prostatic hyperplasia)    Frequent PVCs    HLD (hyperlipidemia)    Hypoxia    PAC (premature atrial contraction)    Smoker    Umbilical hernia without obstruction and without gangrene    Chronic obstructive pulmonary disease (Multi)    Paroxysmal atrial fibrillation (Multi)    Hypercoagulable state due to paroxysmal atrial fibrillation (Multi)    Hyperglycemia    Atrial fibrillation with RVR (Multi)    Atrial fibrillation with rapid ventricular response (Multi)       Plan:   Paroxysmal atrial fibrillation with RVR. On oral anticoagulation. As discussed with the patient, nebivolol is not an ideal rate slowing medication as it is used to avoid bradycardia but is the most cardioselective beta blocker we have to use given his severe COPD. There was discussion with his regular cardiologist about changing to metoprolol succinate (and I have changed to 25 mg daily) but at higher doses that may be required to slow HR it loses its cardioselectivity. Calcium channel blockers are an alternative that would not affect pulmonary function.     If the goal is rate control - changing to cardizem or low dose metoprolol may help, but also changing his respiratory Rx from albuterol to xopinex may also help. As a last  resort, AV srikanth ablation / modification and pacing would work but may not address all of his symptoms.     If the goal is rhythm control: to avoid other symptoms he would need to be put on a specific rhythm medications (which he has not tried). Given his other medical issues his options are tikosyn, multaq, or flecainide (cannot use with moderate coronary calcifications). As he has an appointment with Dr. Myles later this summer and he is on call this weekend. I have consulted Dr. Myles to have this conversation with the patient.     The patient is aware that if tikosyn is started it requires a 3 day hospital stay (through the first 5 doses). Multaq has its own issues but no clear contraindications here, but can be safely started as an outpatient.     Patients questions answered. Will await EP input. May DC home at any time or after seen/directed by EP. Follow-up with Dr. Ibrahim, get CT coronary angiography as already ordered as outpatient.     Thank you for the opportunity to participate in the care of your patient.  Please do not hesitate to call if you have any questions.    Electronically signed by Bertha Schumacher MD, on 6/8/2024 at 9:43 AM

## 2024-06-08 NOTE — NURSING NOTE
Pt remained safe throughout shift. Heart rhythm NSR. Vitals stable. Pt SOB. Sp02 >92% on baseline 3-3.5L NS. Pt reported 2 episodes of diarrhea on both 6/7 and 6/6. No episodes of diarrhea this shift. Pepto bismol given for stomach upset. C.diff PCR ordered.

## 2024-06-09 LAB
ATRIAL RATE: 119 BPM
ATRIAL RATE: 182 BPM
ATRIAL RATE: 357 BPM
ATRIAL RATE: 468 BPM
Q ONSET: 213 MS
Q ONSET: 227 MS
Q ONSET: 229 MS
Q ONSET: 230 MS
QRS COUNT: 15 BEATS
QRS COUNT: 21 BEATS
QRS COUNT: 22 BEATS
QRS COUNT: 24 BEATS
QRS DURATION: 78 MS
QRS DURATION: 80 MS
QRS DURATION: 84 MS
QRS DURATION: 84 MS
QT INTERVAL: 304 MS
QT INTERVAL: 304 MS
QT INTERVAL: 306 MS
QT INTERVAL: 346 MS
QTC CALCULATION(BAZETT): 430 MS
QTC CALCULATION(BAZETT): 445 MS
QTC CALCULATION(BAZETT): 452 MS
QTC CALCULATION(BAZETT): 470 MS
QTC FREDERICIA: 392 MS
QTC FREDERICIA: 396 MS
QTC FREDERICIA: 400 MS
QTC FREDERICIA: 408 MS
R AXIS: 83 DEGREES
R AXIS: 85 DEGREES
R AXIS: 85 DEGREES
R AXIS: 89 DEGREES
T AXIS: 43 DEGREES
T AXIS: 62 DEGREES
T AXIS: 67 DEGREES
T AXIS: 69 DEGREES
T OFFSET: 365 MS
T OFFSET: 382 MS
T OFFSET: 382 MS
T OFFSET: 400 MS
VENTRICULAR RATE: 129 BPM
VENTRICULAR RATE: 133 BPM
VENTRICULAR RATE: 142 BPM
VENTRICULAR RATE: 93 BPM

## 2024-06-10 ENCOUNTER — OFFICE VISIT (OUTPATIENT)
Dept: PRIMARY CARE | Facility: CLINIC | Age: 74
End: 2024-06-10
Payer: MEDICARE

## 2024-06-10 ENCOUNTER — DOCUMENTATION (OUTPATIENT)
Dept: PRIMARY CARE | Facility: CLINIC | Age: 74
End: 2024-06-10

## 2024-06-10 VITALS
BODY MASS INDEX: 23.19 KG/M2 | WEIGHT: 153 LBS | RESPIRATION RATE: 22 BRPM | OXYGEN SATURATION: 95 % | SYSTOLIC BLOOD PRESSURE: 126 MMHG | HEIGHT: 68 IN | HEART RATE: 80 BPM | TEMPERATURE: 97.9 F | DIASTOLIC BLOOD PRESSURE: 60 MMHG

## 2024-06-10 DIAGNOSIS — I48.91 ATRIAL FIBRILLATION WITH RVR (MULTI): Primary | ICD-10-CM

## 2024-06-10 DIAGNOSIS — J96.11 CHRONIC RESPIRATORY FAILURE WITH HYPOXIA (MULTI): ICD-10-CM

## 2024-06-10 DIAGNOSIS — I20.9 ANGINA PECTORIS (CMS-HCC): ICD-10-CM

## 2024-06-10 DIAGNOSIS — C61 PROSTATE CANCER (MULTI): ICD-10-CM

## 2024-06-10 DIAGNOSIS — I48.0 PAROXYSMAL ATRIAL FIBRILLATION (MULTI): ICD-10-CM

## 2024-06-10 DIAGNOSIS — I10 ESSENTIAL (PRIMARY) HYPERTENSION: ICD-10-CM

## 2024-06-10 DIAGNOSIS — J41.8 MIXED SIMPLE AND MUCOPURULENT CHRONIC BRONCHITIS (MULTI): ICD-10-CM

## 2024-06-10 PROCEDURE — 99496 TRANSJ CARE MGMT HIGH F2F 7D: CPT | Performed by: INTERNAL MEDICINE

## 2024-06-10 PROCEDURE — 3078F DIAST BP <80 MM HG: CPT | Performed by: INTERNAL MEDICINE

## 2024-06-10 PROCEDURE — 3074F SYST BP LT 130 MM HG: CPT | Performed by: INTERNAL MEDICINE

## 2024-06-10 PROCEDURE — 1123F ACP DISCUSS/DSCN MKR DOCD: CPT | Performed by: INTERNAL MEDICINE

## 2024-06-10 PROCEDURE — 1111F DSCHRG MED/CURRENT MED MERGE: CPT | Performed by: INTERNAL MEDICINE

## 2024-06-10 PROCEDURE — 1159F MED LIST DOCD IN RCRD: CPT | Performed by: INTERNAL MEDICINE

## 2024-06-10 PROCEDURE — 1158F ADVNC CARE PLAN TLK DOCD: CPT | Performed by: INTERNAL MEDICINE

## 2024-06-10 RX ORDER — DILTIAZEM HYDROCHLORIDE 30 MG/1
30 TABLET, FILM COATED ORAL 4 TIMES DAILY
Qty: 120 TABLET | Refills: 11 | Status: SHIPPED | OUTPATIENT
Start: 2024-06-10 | End: 2025-06-10

## 2024-06-10 ASSESSMENT — ENCOUNTER SYMPTOMS
SHORTNESS OF BREATH: 1
COUGH: 1
NAUSEA: 0
WHEEZING: 1
ABDOMINAL PAIN: 0
CONSTIPATION: 0
DIARRHEA: 1

## 2024-06-10 NOTE — PROGRESS NOTES
"Subjective   Patient ID: Raheel Cloud is a 73 y.o. male who presents for Hospital Follow-up.    HPI     Review of Systems   Respiratory:  Positive for cough, shortness of breath and wheezing.    Cardiovascular:  Positive for leg swelling. Negative for chest pain.   Gastrointestinal:  Positive for diarrhea. Negative for abdominal pain, constipation and nausea.       Objective   /60 (BP Location: Left arm, Patient Position: Sitting, BP Cuff Size: Adult)   Pulse 80   Temp 36.6 °C (97.9 °F) (Tympanic)   Resp 22   Ht 1.727 m (5' 8\")   Wt 69.4 kg (153 lb)   SpO2 95% Comment: 3lpm  BMI 23.26 kg/m²     Physical Exam    Assessment/Plan          "

## 2024-06-10 NOTE — PROGRESS NOTES
"Subjective   Patient ID: Raheel Cloud is a 73 y.o. male who presents for Hospital Follow-up.    Admitted on 6/7 and discharged on 6/8  Today is 6/10  Transition of care phone call made.      He went to the ER due to fast HR.  Found to be in A. Fib with RVR.  He states his breathing was at baseline.    Denies any CP.  Chronic cough without change.  No F/C/S.  Again, he admits that his breathing was at its baseline.      We reviewed and discussed his hospital results.      Review of Systems  LE swelling  unable to use compression.  No leg pain.      Objective   /60 (BP Location: Left arm, Patient Position: Sitting, BP Cuff Size: Adult)   Pulse 80   Temp 36.6 °C (97.9 °F) (Tympanic)   Resp 22   Ht 1.727 m (5' 8\")   Wt 69.4 kg (153 lb)   SpO2 95% Comment: 3lpm  BMI 23.26 kg/m²     Physical Exam  Vitals reviewed.   Constitutional:       Appearance: Normal appearance.   HENT:      Head: Normocephalic.   Cardiovascular:      Rate and Rhythm: Normal rate.   Pulmonary:      Effort: Pulmonary effort is normal.   Musculoskeletal:         General: Normal range of motion.   Neurological:      General: No focal deficit present.      Mental Status: He is alert.   Psychiatric:         Mood and Affect: Mood normal.         Assessment/Plan   Problem List Items Addressed This Visit             ICD-10-CM    Prostate cancer (Multi) C61    Angina pectoris (CMS-HCC) I20.9    Relevant Medications    dilTIAZem (Cardizem) 30 mg immediate release tablet    Chronic obstructive pulmonary disease (Multi) J44.9    Paroxysmal atrial fibrillation (Multi) I48.0    Relevant Medications    dilTIAZem (Cardizem) 30 mg immediate release tablet    Atrial fibrillation with RVR (Multi) - Primary I48.91    Relevant Medications    dilTIAZem (Cardizem) 30 mg immediate release tablet     Other Visit Diagnoses         Codes    Chronic respiratory failure with hypoxia (Multi)     J96.11    Essential (primary) hypertension     I10        Transition " of care.  Very complex with severe underlying illness.    Severe COPD with recurrent a fib with rvr.  Likely severe airway disease triggering COPD.  Discussed risks vs benefit of anti-arrhythmics.  He is going to further discuss with cardiologist.  In meantime we will add short acting CCB.  Discussed need to increase fluids as well.    Renal function and electrolytes were stable.   Mild anemia.    Leukocytosis, but likely from prednisone.  No true sign of infection.    Edema, with minimally elevated BNP.  He otherwise appears euvolemic.  Due to lower BP and addition of CCB we will hold off on restarting lasix.    Follow up in 1 month - sooner if any issues.

## 2024-06-11 ENCOUNTER — TELEPHONE (OUTPATIENT)
Dept: CARDIOLOGY | Facility: CLINIC | Age: 74
End: 2024-06-11
Payer: MEDICARE

## 2024-06-11 LAB
BACTERIA BLD CULT: NORMAL
BACTERIA BLD CULT: NORMAL

## 2024-06-12 ENCOUNTER — PATIENT OUTREACH (OUTPATIENT)
Dept: CARE COORDINATION | Facility: CLINIC | Age: 74
End: 2024-06-12
Payer: MEDICARE

## 2024-06-12 NOTE — PROGRESS NOTES
Call regarding appt. with PCP on 6/10/2024 after hospitalization.  At time of outreach call the patient feels as if their condition has remained the same since last visit. Pt confirms he was able to get new prescriptions from pharmacy for prednisone and cardizem; pt denies issues with medication.  Reviewed the PCP appointment with the pt and addressed any questions or concerns.    Verified upcoming appts;  -6/20/2024 1530 cardiology  -7/11/2024 1420 PCP    Pt denies any questions, needs, or concerns at this time. He is encouraged to call if questions or needs arise.

## 2024-06-20 ENCOUNTER — APPOINTMENT (OUTPATIENT)
Dept: CARDIOLOGY | Facility: CLINIC | Age: 74
End: 2024-06-20
Payer: MEDICARE

## 2024-06-20 VITALS
OXYGEN SATURATION: 95 % | HEIGHT: 69 IN | SYSTOLIC BLOOD PRESSURE: 110 MMHG | DIASTOLIC BLOOD PRESSURE: 60 MMHG | HEART RATE: 71 BPM | WEIGHT: 150 LBS | BODY MASS INDEX: 22.22 KG/M2

## 2024-06-20 DIAGNOSIS — I25.84 CORONARY ARTERY CALCIFICATION: ICD-10-CM

## 2024-06-20 DIAGNOSIS — I48.0 PAROXYSMAL ATRIAL FIBRILLATION (MULTI): Primary | ICD-10-CM

## 2024-06-20 DIAGNOSIS — I25.10 CORONARY ARTERY CALCIFICATION: ICD-10-CM

## 2024-06-20 DIAGNOSIS — E78.5 DYSLIPIDEMIA: ICD-10-CM

## 2024-06-20 PROBLEM — I20.9 ANGINA PECTORIS (CMS-HCC): Status: RESOLVED | Noted: 2023-05-11 | Resolved: 2024-06-20

## 2024-06-20 PROCEDURE — 1160F RVW MEDS BY RX/DR IN RCRD: CPT | Performed by: INTERNAL MEDICINE

## 2024-06-20 PROCEDURE — 1159F MED LIST DOCD IN RCRD: CPT | Performed by: INTERNAL MEDICINE

## 2024-06-20 PROCEDURE — 1123F ACP DISCUSS/DSCN MKR DOCD: CPT | Performed by: INTERNAL MEDICINE

## 2024-06-20 PROCEDURE — 1126F AMNT PAIN NOTED NONE PRSNT: CPT | Performed by: INTERNAL MEDICINE

## 2024-06-20 PROCEDURE — 1111F DSCHRG MED/CURRENT MED MERGE: CPT | Performed by: INTERNAL MEDICINE

## 2024-06-20 PROCEDURE — 99214 OFFICE O/P EST MOD 30 MIN: CPT | Performed by: INTERNAL MEDICINE

## 2024-06-20 ASSESSMENT — ENCOUNTER SYMPTOMS
LOSS OF SENSATION IN FEET: 0
OCCASIONAL FEELINGS OF UNSTEADINESS: 0
DEPRESSION: 1

## 2024-06-20 ASSESSMENT — PAIN SCALES - GENERAL: PAINLEVEL: 0-NO PAIN

## 2024-06-20 NOTE — PROGRESS NOTES
"Chief Complaint:   No chief complaint on file.     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    Has not had any palpitations since his last hospitalization.  Legs have swollen similarly to the what happened when he was on steroids after having COVID last year.  The leg swelling did get better after he stopped the steroids.  He recently decrease his steroid dose from 40 mg to 20 mg daily.    Since his last office visit the patient [place] [facility] [place] span of early May and mid June 2024.  Initially he was admitted with a COPD exacerbation and discharged after several days of antibiotics and steroids.  He returned to the hospital 2 weeks later with shortness of breath and was in atrial fibrillation with rapid ventricular response.  This was felt to be secondary perhaps to continue nicotine use and increased caffeine intake.  He spontaneously converted to sinus rhythm while on a Cardizem drip.  His Bystolic was increased.  He presented to the hospital again approximately a week and a half later with atrial fibrillation and rapid ventricular response.  He once again spontaneously converted.  He was seen by EP who recommended continuing his Bystolic and Xarelto for now.  He was given options for future management including the use of antiarrhythmic therapy, ablative therapy, or even AV srikanth ablation with permanent pacemaker.  He was discharged on rate control and anticoagulation.    Limited echocardiogram 5/6/2024: EF 55%.  Hypokinesis of the basal to mid inferior septum and mid anterior septum.  Grade 1 diastolic dysfunction.    CT chest without contrast 5/5/2024: Peribronchial vascular groundglass infiltrates superimposed upon emphysematous changes.  Mild to " moderate atherosclerotic calcifications noted of the thoracic aorta and moderate coronary artery calcifications noted.     Echocardiogram 1/10/2023: EF 60 to 65%.  Trace MR and trace to mild TR.     Treadmill stress echocardiogram 10/4/2022: Exercised for 2 minutes and 10 seconds on a standard Jose protocol achieving 95% of the age-predicted maximal heart rate.  EF 60% at baseline increasing to 80% at peak stress.     Past Medical History:  He has a past medical history of Personal history of other diseases of the respiratory system (06/23/2021).    Past Surgical History:  He has a past surgical history that includes Other surgical history (09/24/2020) and Other surgical history (09/24/2020).      Social History:  He reports that he has been smoking cigarettes. He has never used smokeless tobacco. He reports that he does not currently use alcohol. He reports that he does not currently use drugs.    Family History:  No family history on file.     Allergies:  Patient has no known allergies.    Outpatient Medications:  Current Outpatient Medications   Medication Instructions    ammonium lactate (Amlactin) 12 % cream 1 Application, Topical, 2 times daily PRN    budesonide (PULMICORT) 1 mg, nebulization, 2 times daily RT, Rinse mouth with water after use to reduce aftertaste and incidence of candidiasis. Do not swallow.    fluticasone (Flonase) 50 mcg/actuation nasal spray 2 sprays, Each Nostril, 2 times daily, Shake gently. Before first use, prime pump. After use, clean tip and replace cap.    ipratropium-albuteroL (Combivent Respimat)  mcg/actuation inhaler 1 puff, inhalation, Every 4 hours PRN    ipratropium-albuteroL (Duo-Neb) 0.5-2.5 mg/3 mL nebulizer solution 3 mL, inhalation, 4 times daily PRN    magnesium oxide (MAG-OX) 400 mg, oral, Daily    nebivolol (BYSTOLIC) 2.5 mg, oral, 2 times daily    ondansetron (ZOFRAN) 8 mg, oral, Every 8 hours PRN    oxygen (O2) 3 L/min, inhalation, Continuous    potassium  "chloride CR (Klor-Con M20) 20 mEq ER tablet 20 mEq, oral, Daily, Do not crush or chew.    rivaroxaban (XARELTO) 15 mg, oral, Daily with evening meal, Take with food.    roflumilast (DALIRESP) 500 mcg, oral, Daily    triamcinolone (Kenalog) 0.1 % ointment 1 Application, Topical, 2 times daily PRN    Ventolin HFA 90 mcg/actuation inhaler 2 puffs, inhalation, Every 4 hours PRN       Last Recorded Vitals:  Visit Vitals  /60 (BP Location: Right arm, Patient Position: Sitting)   Pulse 71   Ht 1.753 m (5' 9\")   Wt 68 kg (150 lb)   SpO2 95%   BMI 22.15 kg/m²   Smoking Status Every Day   BSA 1.82 m²      LASTWT(3):   Wt Readings from Last 3 Encounters:   06/20/24 68 kg (150 lb)   06/10/24 69.4 kg (153 lb)   06/08/24 70.9 kg (156 lb 6.4 oz)       Physical Exam:  In general: alert and in no acute distress.   HEENT: Carotid upstrokes normal with no bruits. JVP is normal.   Pulmonary: Clear to auscultation bilaterally.  Cardiovascular: S1,S2, regular. No appreciable murmurs, rubs or gallops.   Lower extremities: Warm. 2+ distal pulses.  1+ bilateral lower extremity edema.     Last Labs:  CBC -  Recent Labs     06/08/24  0504 06/07/24  0139 05/27/24  0504   WBC 12.0* 12.5* 11.7*   HGB 10.7* 13.0* 12.2*   HCT 35.2* 41.2 39.7*    202 208   MCV 94 93 94       CMP -  Recent Labs     06/08/24  0504 06/07/24  0139 05/27/24  0504 05/27/24  0036    137 143 144   K 3.9 4.5 4.7 3.7    96* 105 109*   CO2 36* 32 30 28   ANIONGAP 9* 14 13 11   BUN 16 21 19 18   CREATININE 0.88 0.98 0.78 0.72   EGFR >90 81 >90 >90   MG 2.11 2.33  --  1.93     Recent Labs     06/08/24  0504 06/07/24  0139 05/27/24  0504 05/27/24  0036   ALBUMIN 3.4 4.1 3.6 3.2*   ALKPHOS  --  46 44 41   ALT  --  26 30 27   AST  --  13 12 11   BILITOT  --  0.7 0.5 0.5       LIPID PANEL -   Recent Labs     04/17/24  1130 11/17/22  1215 04/29/22  1537 06/18/21  1657   CHOL 160 128 151 181   LDLCALC 95  --   --   --    LDLF  --  64 81 114*   HDL 41.9 46.0 " 44.0 33.0*   TRIG 118 88 128 168*       Recent Labs     06/07/24  0139 05/27/24  0036 05/24/24  2229 05/04/24  1433 04/17/24  1130   * 107* 104*   < >  --    HGBA1C  --   --   --   --  5.7*    < > = values in this interval not displayed.           Assessment/Plan   1) atrial fibrillation: Would recommend continued rate control and anticoagulation for now.  EP follow-up scheduled for August.  As long as he does not have a recurrence of atrial fibrillation in a short period of time I am happy to stay with rate control.  We spoke about the fact that we can always try diltiazem.  We can also try metoprolol.  Explained that diltiazem, metoprolol, and nebivolol are all simply slowing his AV node and not converting him.     2) dyslipidemia: Continue statin therapy     3) coronary artery calcification: CT coronary angiogram ordered.    4) lower extremity edema: Asked him to try Tubigrip.  If that does not work he can try using every other day furosemide as needed.    5) follow-up: 3 months or sooner if needed        Paul Ibrahim MD

## 2024-06-21 DIAGNOSIS — I25.84 CORONARY ARTERY CALCIFICATION: Primary | ICD-10-CM

## 2024-06-21 DIAGNOSIS — I25.10 CORONARY ARTERY CALCIFICATION: Primary | ICD-10-CM

## 2024-06-21 RX ORDER — DILTIAZEM HYDROCHLORIDE 60 MG/1
60 TABLET, FILM COATED ORAL ONCE
Qty: 1 TABLET | Refills: 0 | Status: SHIPPED | OUTPATIENT
Start: 2024-06-21 | End: 2024-06-21

## 2024-07-05 ENCOUNTER — HOSPITAL ENCOUNTER (OUTPATIENT)
Dept: RADIOLOGY | Facility: CLINIC | Age: 74
Discharge: HOME | End: 2024-07-05
Payer: MEDICARE

## 2024-07-05 VITALS
SYSTOLIC BLOOD PRESSURE: 93 MMHG | RESPIRATION RATE: 24 BRPM | OXYGEN SATURATION: 97 % | HEART RATE: 82 BPM | DIASTOLIC BLOOD PRESSURE: 41 MMHG

## 2024-07-05 DIAGNOSIS — R06.09 DOE (DYSPNEA ON EXERTION): ICD-10-CM

## 2024-07-05 DIAGNOSIS — I20.89 OTHER FORMS OF ANGINA PECTORIS (CMS-HCC): ICD-10-CM

## 2024-07-05 DIAGNOSIS — R93.1 ABNORMAL FINDINGS ON DIAGNOSTIC IMAGING OF HEART AND CORONARY CIRCULATION: ICD-10-CM

## 2024-07-05 PROCEDURE — 2550000001 HC RX 255 CONTRASTS: Performed by: INTERNAL MEDICINE

## 2024-07-05 PROCEDURE — 75580 N-INVAS EST C FFR SW ALY CTA: CPT

## 2024-07-05 PROCEDURE — 2500000001 HC RX 250 WO HCPCS SELF ADMINISTERED DRUGS (ALT 637 FOR MEDICARE OP): Performed by: INTERNAL MEDICINE

## 2024-07-05 PROCEDURE — 75574 CT ANGIO HRT W/3D IMAGE: CPT

## 2024-07-05 RX ORDER — NITROGLYCERIN 400 UG/1
2 SPRAY ORAL ONCE
Status: COMPLETED | OUTPATIENT
Start: 2024-07-05 | End: 2024-07-05

## 2024-07-05 NOTE — NURSING NOTE
Post CCTA pt denies feeling dizzy or lightheaded, denies headache. Steady on feet, skin warm pink and dry. No increase in ANN notede.  Pt verbalized understanding of increased water intake x24 hours, educated on side effects of medications. HL removed with tip intact, manual pressure held until hemostasis achieved, 2x2 and coban to site. Pt DC home to self care with friend

## 2024-07-05 NOTE — NURSING NOTE
Patient HR 70's to 80's.  States that his HR is always up due to the necessary resp. Nebulizers.  SBP 95, and pt. Reluctant to take any further medication for HR control  Dr. Torres notified.  States that we will proceed with CCTA, but images may be suboptimal.

## 2024-07-10 DIAGNOSIS — I48.91 ATRIAL FIBRILLATION WITH RVR (MULTI): ICD-10-CM

## 2024-07-10 DIAGNOSIS — E78.5 DYSLIPIDEMIA: Primary | ICD-10-CM

## 2024-07-10 RX ORDER — NEBIVOLOL 2.5 MG/1
2.5 TABLET ORAL 2 TIMES DAILY
Qty: 180 TABLET | Refills: 3 | Status: SHIPPED | OUTPATIENT
Start: 2024-07-10 | End: 2024-07-12 | Stop reason: SDUPTHER

## 2024-07-10 RX ORDER — ATORVASTATIN CALCIUM 40 MG/1
40 TABLET, FILM COATED ORAL DAILY
Qty: 90 TABLET | Refills: 3 | Status: SHIPPED | OUTPATIENT
Start: 2024-07-10 | End: 2025-07-10

## 2024-07-11 ENCOUNTER — APPOINTMENT (OUTPATIENT)
Dept: PRIMARY CARE | Facility: CLINIC | Age: 74
End: 2024-07-11
Payer: MEDICARE

## 2024-07-11 VITALS
BODY MASS INDEX: 22.22 KG/M2 | HEART RATE: 88 BPM | HEIGHT: 69 IN | WEIGHT: 150 LBS | OXYGEN SATURATION: 95 % | RESPIRATION RATE: 14 BRPM | TEMPERATURE: 97.9 F | SYSTOLIC BLOOD PRESSURE: 100 MMHG | DIASTOLIC BLOOD PRESSURE: 50 MMHG

## 2024-07-11 DIAGNOSIS — I10 ESSENTIAL (PRIMARY) HYPERTENSION: ICD-10-CM

## 2024-07-11 DIAGNOSIS — I48.91 ATRIAL FIBRILLATION WITH RVR (MULTI): Primary | ICD-10-CM

## 2024-07-11 DIAGNOSIS — J41.8 MIXED SIMPLE AND MUCOPURULENT CHRONIC BRONCHITIS (MULTI): ICD-10-CM

## 2024-07-11 PROCEDURE — 99213 OFFICE O/P EST LOW 20 MIN: CPT | Performed by: INTERNAL MEDICINE

## 2024-07-11 PROCEDURE — 3074F SYST BP LT 130 MM HG: CPT | Performed by: INTERNAL MEDICINE

## 2024-07-11 PROCEDURE — 1159F MED LIST DOCD IN RCRD: CPT | Performed by: INTERNAL MEDICINE

## 2024-07-11 PROCEDURE — 1158F ADVNC CARE PLAN TLK DOCD: CPT | Performed by: INTERNAL MEDICINE

## 2024-07-11 PROCEDURE — 3078F DIAST BP <80 MM HG: CPT | Performed by: INTERNAL MEDICINE

## 2024-07-11 PROCEDURE — 1160F RVW MEDS BY RX/DR IN RCRD: CPT | Performed by: INTERNAL MEDICINE

## 2024-07-11 PROCEDURE — 1123F ACP DISCUSS/DSCN MKR DOCD: CPT | Performed by: INTERNAL MEDICINE

## 2024-07-11 RX ORDER — BUDESONIDE, GLYCOPYRROLATE, AND FORMOTEROL FUMARATE 160; 9; 4.8 UG/1; UG/1; UG/1
2 AEROSOL, METERED RESPIRATORY (INHALATION)
Qty: 72 G | Refills: 3 | OUTPATIENT
Start: 2024-07-11 | End: 2025-07-11

## 2024-07-11 ASSESSMENT — ENCOUNTER SYMPTOMS
CONSTIPATION: 0
NAUSEA: 0
ABDOMINAL PAIN: 0
PALPITATIONS: 0
SHORTNESS OF BREATH: 1
COUGH: 1
DIARRHEA: 0

## 2024-07-11 NOTE — PROGRESS NOTES
"Subjective   Patient ID: Raheel Cloud is a 73 y.o. male who presents for COPD.    Follows with pulmonology.  Also follows with cardio for a. Fib  As of late he has been doing fair.  Weaning down on prednisone.    Chronic cough and chronic SOB.      Review of Systems   Respiratory:  Positive for cough and shortness of breath.    Cardiovascular:  Negative for chest pain and palpitations.   Gastrointestinal:  Negative for abdominal pain, constipation, diarrhea and nausea.       Objective   /50 (BP Location: Left arm, Patient Position: Sitting, BP Cuff Size: Adult)   Pulse 88   Temp 36.6 °C (97.9 °F) (Tympanic)   Resp 14   Ht 1.74 m (5' 8.5\")   Wt 68 kg (150 lb)   SpO2 95%   BMI 22.48 kg/m²     Physical Exam  Vitals reviewed.   Constitutional:       Appearance: Normal appearance.   HENT:      Head: Normocephalic.   Cardiovascular:      Rate and Rhythm: Normal rate.   Pulmonary:      Effort: Pulmonary effort is normal.   Musculoskeletal:         General: Normal range of motion.   Neurological:      General: No focal deficit present.      Mental Status: He is alert.   Psychiatric:         Mood and Affect: Mood normal.       Assessment/Plan   Problem List Items Addressed This Visit             ICD-10-CM    Chronic obstructive pulmonary disease (Multi) J44.9    Relevant Medications    budesonide-glycopyr-formoterol (Breztri Aerosphere) 160-9-4.8 mcg/actuation HFA aerosol inhaler    Atrial fibrillation with RVR (Multi) - Primary I48.91     Other Visit Diagnoses         Codes    Essential (primary) hypertension     I10          We discussed the above.    We also discussed his grieving and sadness about the loss of his wife.  He feels he is doing ok and does not feel he needs medication etc.  He has strong family support.    COPD - severe but stable.    A. Fib - currently controlled.    Follow up in 3 months/prn     "

## 2024-07-12 ENCOUNTER — HOSPITAL ENCOUNTER (OUTPATIENT)
Dept: RADIOLOGY | Facility: CLINIC | Age: 74
Discharge: HOME | End: 2024-07-12
Payer: MEDICARE

## 2024-07-12 DIAGNOSIS — I48.91 ATRIAL FIBRILLATION WITH RVR (MULTI): ICD-10-CM

## 2024-07-12 RX ORDER — NEBIVOLOL 2.5 MG/1
2.5 TABLET ORAL 2 TIMES DAILY
Qty: 180 TABLET | Refills: 3 | OUTPATIENT
Start: 2024-07-12 | End: 2025-07-12

## 2024-07-16 ENCOUNTER — PATIENT OUTREACH (OUTPATIENT)
Dept: CARE COORDINATION | Facility: CLINIC | Age: 74
End: 2024-07-16
Payer: MEDICARE

## 2024-07-16 NOTE — PROGRESS NOTES
Successful one month outreach to patient regarding hospitalization as patient continues TCM program.   At time of outreach call the patient feels as if their condition is slowly improving since initial visit with PCP or specialist.  Questions or concerns addressed at this time with patient.   Pt denies any questions, needs, or concerns. Pt had PCP appt 7/11 and denies any current needs from PCP.   Verified pt cardiology appt 8/2/2024 1140.    Pt encouraged to call if questions or needs arise.

## 2024-07-18 ENCOUNTER — APPOINTMENT (OUTPATIENT)
Dept: RADIOLOGY | Facility: HOSPITAL | Age: 74
End: 2024-07-18
Payer: MEDICARE

## 2024-08-02 ENCOUNTER — APPOINTMENT (OUTPATIENT)
Dept: CARDIOLOGY | Facility: CLINIC | Age: 74
End: 2024-08-02
Payer: MEDICARE

## 2024-08-02 VITALS
HEIGHT: 69 IN | TEMPERATURE: 98.2 F | SYSTOLIC BLOOD PRESSURE: 100 MMHG | DIASTOLIC BLOOD PRESSURE: 58 MMHG | BODY MASS INDEX: 21.8 KG/M2 | WEIGHT: 147.2 LBS | HEART RATE: 88 BPM

## 2024-08-02 DIAGNOSIS — I48.0 PAROXYSMAL ATRIAL FIBRILLATION (MULTI): Primary | ICD-10-CM

## 2024-08-02 PROCEDURE — 1159F MED LIST DOCD IN RCRD: CPT | Performed by: INTERNAL MEDICINE

## 2024-08-02 PROCEDURE — 1123F ACP DISCUSS/DSCN MKR DOCD: CPT | Performed by: INTERNAL MEDICINE

## 2024-08-02 PROCEDURE — 3008F BODY MASS INDEX DOCD: CPT | Performed by: INTERNAL MEDICINE

## 2024-08-02 PROCEDURE — 99214 OFFICE O/P EST MOD 30 MIN: CPT | Performed by: INTERNAL MEDICINE

## 2024-08-02 NOTE — PROGRESS NOTES
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  He has advanced chronic obstructive pulmonary disease and is on home oxygen therapy continuously.  He has had paroxysmal atrial fibrillation over the past few years, typically flaring up with COPD exacerbations.  He had such a spell in May 2024, but converted to sinus rhythm spontaneously.  He had recurrence of atrial fibrillation in early 2024 but this resolved after roughly 9 hours.  He has been on a simple \"rate control strategy\" with Bystolic, along with Xarelto anticoagulation.  He has not been on specific antiarrhythmic therapy.    The patient is known to have coronary calcification (coronary calcium score 1146) but no high-grade disease was suggested by CT angiography, and the patient has tolerated ventricular responses to the 170s when in atrial fibrillation.  A functional cardiac study in  was negative for ischemia    The patient's history is also remarkable for hyperlipidemia, benign prostatic hypertrophy, remote prostate cancer, and a stable coronary nodule.    The patient is a retired ER physician.  His son James is a hand surgeon at [facility] [employer] [facility] [place].  The patient's wife  earlier this year after over 50 years of marriage.  The patient continues to smoke roughly 1/2 pack of cigarettes daily.    Current Outpatient Medications   Medication Sig    ammonium lactate (Amlactin) 12 % cream Apply 1 Application topically 2 times a day as needed for dry skin.    atorvastatin (Lipitor) 40 mg tablet Take 1 tablet (40 mg) by mouth once daily.    budesonide (Pulmicort) 1 mg/2 mL nebulizer solution Take 2 mL (1 mg) by nebulization 2 times a day. Rinse mouth with water after use to reduce aftertaste and incidence of candidiasis. Do not swallow.    fluticasone (Flonase) 50 mcg/actuation nasal spray Administer 2 sprays into " each nostril 2 times a day. Shake gently. Before first use, prime pump. After use, clean tip and replace cap.    ipratropium-albuteroL (Combivent Respimat)  mcg/actuation inhaler Inhale 1 puff every 4 hours if needed for wheezing.    ipratropium-albuteroL (Duo-Neb) 0.5-2.5 mg/3 mL nebulizer solution Inhale 3 mL 4 times a day as needed for wheezing or shortness of breath.    nebivolol (Bystolic) 2.5 mg tablet Take 1 tablet (2.5 mg) by mouth 2 times a day.    ondansetron (Zofran) 8 mg tablet Take 1 tablet (8 mg) by mouth every 8 hours if needed for nausea or vomiting.    oxygen (O2) gas therapy Inhale 3 L/min continuously. Indications: hypoxia    rivaroxaban (Xarelto) 15 mg tablet Take 1 tablet (15 mg) by mouth once daily in the evening. Take with meals. Take with food.    roflumilast (Daliresp) 500 mcg tablet Take 1 tablet (500 mcg) by mouth once daily.    triamcinolone (Kenalog) 0.1 % ointment Apply 1 Application topically 2 times a day as needed.    Ventolin HFA 90 mcg/actuation inhaler Inhale 2 puffs every 4 hours if needed for wheezing.    budesonide-glycopyr-formoterol (Breztri Aerosphere) 160-9-4.8 mcg/actuation HFA aerosol inhaler Inhale 2 puffs 2 times a day.     Allergies:  Patient has no known allergies.     Patient Active Problem List   Diagnosis    Prostate cancer (Multi)    BPH (benign prostatic hyperplasia)    Frequent PVCs    Dyslipidemia    Hypoxia    PAC (premature atrial contraction)    Smoker    Umbilical hernia without obstruction and without gangrene    Chronic obstructive pulmonary disease (Multi)    Paroxysmal atrial fibrillation (Multi)    Hypercoagulable state due to paroxysmal atrial fibrillation (Multi)    Hyperglycemia    Atrial fibrillation with RVR (Multi)    Coronary artery calcification     Past Surgical History:   Procedure Laterality Date    OTHER SURGICAL HISTORY  09/24/2020    Umbilical hernia repair laparoscopic    OTHER SURGICAL HISTORY  09/24/2020    Peritoneal  "adhesiolysis     Objective:  Vitals:    08/02/24 1153   BP: 100/58   Pulse: 88   Temp: 36.8 °C (98.2 °F)   Height:     1.74 m (5' 8.5\")  Weight: 66.8 kg (147 lb 3.2 oz)     Exam:  Gen: Thin gentleman in no distress, appearing a bit younger than his stated age, on supplemental oxygen by nasal cannula.  HEENT: No scleral icterus.  Neck: No jugular venous distention or thyromegaly.  Lungs: Diminished breath sounds throughout, with few rhonchi in the right upper lung field.  Heart: Regular rhythm without extrasystoles, murmurs or gallops.  Abdomen: Benign, with no organomegaly or masses.  Extremities: Intact distal pulses; no edema.  Neuro: No focal neurologic abnormalities.  Skin: No cutaneous lesions.    Impressions:  1.  Chronic obstructive pulmonary disease, on supplemental oxygen therapy continuously.  2.  Coronary artery calcification without known flow-limiting disease by prior evaluation.  3.  Paroxysmal atrial fibrillation, with recent flareups in the setting of COPD exacerbations or pneumonia.  The patient believes that he is staying in sinus rhythm presently, which he checks on his Apple Watch and using a [employer] SA Mobile device at home several times daily.  He is currently on a \"rate control strategy\" with Bystolic.  He has a [id number] score of at least 2 (vascular disease, age over 65), and he is appropriately anticoagulated with Xarelto.  At present, I do not see any strong indication for a \"rhythm control strategy\" with antiarrhythmic therapy.  4.  Other medical problems, including benign prostatic hypertrophy, remote prostate cancer, hyperlipidemia, and lower extremity venous insufficiency.    Recommendations:  1.  The patient will continue his Bystolic and Xarelto.  2.  He will follow-up with Dr. Paul Ibrahim and be seen by Electrophysiology on a PRN basis.  3.  With breakthroughs of atrial fibrillation that last greater than 24 hours in duration, he may simply be cardioverted.  4.  With cardioversion " is necessary more than twice per year, the patient will be considered for antiarrhythmic therapy.  He is a poor candidate for flecainide or propafenone by virtue of his coronary calcification.  He is a poor candidate for sotalol or amiodarone by virtue of his lung disease.  He might be a candidate for dofetilide, though he is very nervous about taking a QT-prolonging drug that can cause torsades.  Another option would be dronedarone, though it is not particularly potent.  5.  If the patient ever settles in atrial fibrillation that is refractory to any antiarrhythmic therapy that is chosen, he would then be a candidate for pulmonary vein isolation (high risk given his pulmonary status), or for AV junction ablation and permanent pacemaker placement (lower risk, and perhaps using a [employer] Micra VR leadless VVIR pacemaker).      Sedrick Myles MD

## 2024-09-04 ENCOUNTER — PATIENT OUTREACH (OUTPATIENT)
Dept: PRIMARY CARE | Facility: CLINIC | Age: 74
End: 2024-09-04
Payer: MEDICARE

## 2024-10-03 ENCOUNTER — OFFICE VISIT (OUTPATIENT)
Dept: CARDIOLOGY | Facility: CLINIC | Age: 74
End: 2024-10-03
Payer: MEDICARE

## 2024-10-03 VITALS
OXYGEN SATURATION: 96 % | HEIGHT: 69 IN | HEART RATE: 82 BPM | DIASTOLIC BLOOD PRESSURE: 60 MMHG | WEIGHT: 142 LBS | SYSTOLIC BLOOD PRESSURE: 102 MMHG | BODY MASS INDEX: 21.03 KG/M2

## 2024-10-03 DIAGNOSIS — I48.91 ATRIAL FIBRILLATION WITH RVR (MULTI): ICD-10-CM

## 2024-10-03 DIAGNOSIS — I25.10 CORONARY ARTERY CALCIFICATION: Primary | ICD-10-CM

## 2024-10-03 PROCEDURE — 99214 OFFICE O/P EST MOD 30 MIN: CPT | Performed by: INTERNAL MEDICINE

## 2024-10-03 PROCEDURE — 1159F MED LIST DOCD IN RCRD: CPT | Performed by: INTERNAL MEDICINE

## 2024-10-03 PROCEDURE — 1123F ACP DISCUSS/DSCN MKR DOCD: CPT | Performed by: INTERNAL MEDICINE

## 2024-10-03 PROCEDURE — 1160F RVW MEDS BY RX/DR IN RCRD: CPT | Performed by: INTERNAL MEDICINE

## 2024-10-03 PROCEDURE — 1126F AMNT PAIN NOTED NONE PRSNT: CPT | Performed by: INTERNAL MEDICINE

## 2024-10-03 PROCEDURE — 3008F BODY MASS INDEX DOCD: CPT | Performed by: INTERNAL MEDICINE

## 2024-10-03 ASSESSMENT — ENCOUNTER SYMPTOMS
OCCASIONAL FEELINGS OF UNSTEADINESS: 0
LOSS OF SENSATION IN FEET: 0
DEPRESSION: 1

## 2024-10-03 ASSESSMENT — PAIN SCALES - GENERAL: PAINLEVEL: 0-NO PAIN

## 2024-10-03 NOTE — PROGRESS NOTES
"Chief Complaint:   No chief complaint on file.     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    Was recently on steroids again and noticed leg swelling.  Since completing the steroids and the leg swelling has gone back down significantly.  He is chronically short of breath but believes his current level shortness of breath is no worse than it has been over the past several months or years.  Denies any chest pain.    Is accompanied by his brother-in-law to the visit.    CT angio with heart flow 7/5/2024:  LM: Distal calcified plaque without significant stenosis.  LAD: Wraps around the apex.  Proximal calcified plaque without significant stenosis.  Mid vessel with mixed plaque of 50%.  Distal LAD FFR 0.77.  D1 with proximal plaque without significant stenosis.  LCx nondominant.  Proximal vessel with no significant stenosis.  OM1 is small and normal.  OM 2 has proximal calcified plaque of 50 to 70% stenosis with mid FFR 0.67.  RCA dominant vessel.  Proximal calcified plaque without significant stenosis.  Mid vessel 50 to 70% stenosis however unable to obtain FFR due to motion artifact.  Stable subpleural density in the posterior lateral right lower lobe.    Limited echocardiogram 5/6/2024: EF 55%.  Hypokinesis of the basal to mid inferior septum and mid anterior septum.  Grade 1 diastolic dysfunction.    CT chest without contrast 5/5/2024: Peribronchial vascular groundglass infiltrates superimposed upon emphysematous changes.  Mild to moderate atherosclerotic calcifications noted of the thoracic aorta and moderate coronary artery calcifications noted.     Echocardiogram 1/10/2023: EF 60 to 65%.  Trace MR and trace to mild TR.     Treadmill stress echocardiogram 10/4/2022: " Exercised for 2 minutes and 10 seconds on a standard Jose protocol achieving 95% of the age-predicted maximal heart rate.  EF 60% at baseline increasing to 80% at peak stress.     Past Medical History:  He has a past medical history of Personal history of other diseases of the respiratory system (06/23/2021).    Past Surgical History:  He has a past surgical history that includes Other surgical history (09/24/2020) and Other surgical history (09/24/2020).      Social History:  He reports that he has been smoking cigarettes. He has never used smokeless tobacco. He reports that he does not currently use alcohol. He reports that he does not currently use drugs.    Family History:  No family history on file.     Allergies:  Patient has no known allergies.    Outpatient Medications:  Current Outpatient Medications   Medication Instructions    ammonium lactate (Amlactin) 12 % cream 1 Application, Topical, 2 times daily PRN    atorvastatin (LIPITOR) 40 mg, oral, Daily    budesonide (PULMICORT) 1 mg, nebulization, 2 times daily RT, Rinse mouth with water after use to reduce aftertaste and incidence of candidiasis. Do not swallow.    budesonide-glycopyr-formoterol (Breztri Aerosphere) 160-9-4.8 mcg/actuation HFA aerosol inhaler 2 puffs, inhalation, 2 times daily RT    fluticasone (Flonase) 50 mcg/actuation nasal spray 2 sprays, Each Nostril, 2 times daily, Shake gently. Before first use, prime pump. After use, clean tip and replace cap.    ipratropium-albuteroL (Combivent Respimat)  mcg/actuation inhaler 1 puff, inhalation, Every 4 hours PRN    ipratropium-albuteroL (Duo-Neb) 0.5-2.5 mg/3 mL nebulizer solution 3 mL, inhalation, 4 times daily PRN    nebivolol (BYSTOLIC) 2.5 mg, oral, 2 times daily    ondansetron (ZOFRAN) 8 mg, oral, Every 8 hours PRN    oxygen (O2) 3 L/min, inhalation, Continuous    rivaroxaban (XARELTO) 15 mg, oral, Daily with evening meal, Take with food.    roflumilast (DALIRESP) 500 mcg, oral,  "Daily    triamcinolone (Kenalog) 0.1 % ointment 1 Application, Topical, 2 times daily PRN    Ventolin HFA 90 mcg/actuation inhaler 2 puffs, inhalation, Every 4 hours PRN       Last Recorded Vitals:  Visit Vitals  /60 (BP Location: Right arm, Patient Position: Sitting)   Pulse 82   Ht 1.753 m (5' 9\")   Wt 64.4 kg (142 lb)   SpO2 96%   BMI 20.97 kg/m²   Smoking Status Every Day   BSA 1.77 m²      LASTWT(3):   Wt Readings from Last 3 Encounters:   10/03/24 64.4 kg (142 lb)   08/02/24 66.8 kg (147 lb 3.2 oz)   07/11/24 68 kg (150 lb)       Physical Exam:  In general: alert and in no acute distress.   HEENT: Carotid upstrokes normal with no bruits. JVP is normal.   Pulmonary: Clear to auscultation bilaterally.  Cardiovascular: S1,S2, regular. No appreciable murmurs, rubs or gallops.   Lower extremities: Warm. 2+ distal pulses.  Trace bilateral lower extremity edema.     Last Labs:  CBC -  Recent Labs     06/08/24  0504 06/07/24  0139 05/27/24  0504   WBC 12.0* 12.5* 11.7*   HGB 10.7* 13.0* 12.2*   HCT 35.2* 41.2 39.7*    202 208   MCV 94 93 94       CMP -  Recent Labs     06/08/24  0504 06/07/24 0139 05/27/24  0504 05/27/24  0036    137 143 144   K 3.9 4.5 4.7 3.7    96* 105 109*   CO2 36* 32 30 28   ANIONGAP 9* 14 13 11   BUN 16 21 19 18   CREATININE 0.88 0.98 0.78 0.72   EGFR >90 81 >90 >90   MG 2.11 2.33  --  1.93     Recent Labs     06/08/24  0504 06/07/24  0139 05/27/24  0504 05/27/24  0036   ALBUMIN 3.4 4.1 3.6 3.2*   ALKPHOS  --  46 44 41   ALT  --  26 30 27   AST  --  13 12 11   BILITOT  --  0.7 0.5 0.5       LIPID PANEL -   Recent Labs     04/17/24  1130 11/17/22  1215 04/29/22  1537 06/18/21  1657   CHOL 160 128 151 181   LDLCALC 95  --   --   --    LDLF  --  64 81 114*   HDL 41.9 46.0 44.0 33.0*   TRIG 118 88 128 168*       Recent Labs     06/07/24  0139 05/27/24  0036 05/24/24  2229 05/04/24  1433 04/17/24  1130   * 107* 104*   < >  --    HGBA1C  --   --   --   --  5.7*    < > " = values in this interval not displayed.           Assessment/Plan   1) atrial fibrillation: No symptomatic recurrence of palpitations or detection of atrial fibrillation for the past several months.  Would recommend continued rate control with nebivolol and anticoagulation with Xarelto.    Did see Dr. Myles recently who recommended that he follow-up on an as-needed basis.     2) dyslipidemia: Continue statin therapy.  LDL would be drawn soon.     3) coronary artery calcification: CT coronary angiogram with heart flow demonstrated hemodynamically significant disease of the OM 2.  Although there was calcified plaque in the RCA it was not able to be evaluated from a hemodynamic standpoint due to motion artifact.    We had a very lengthy discussion about this.  I explained that as long as he has stable symptoms that there is little evidence that proceeding with catheterization and possible PCI would result in a reduction in mortality or MI.  It may make him feel better.  This was the crux of this discussion.  Clearly there is a component of shortness of breath that is due to his underlying pulmonary disease.  I explained that there may be a component that is related to obstructive coronary disease as well.  Because his symptoms have not changed significantly over the past several months (as long as he is not having a COPD exacerbation) then I believe it would be okay to continue to observe for now.    I have asked him to give our office a call if his shortness of breath seems to worsen or if he develops chest pain.  At that point I would discussed the idea of heart catheterization.    4) lower extremity edema: Has significantly improved off of steroids.    5) follow-up: 6 months or sooner if needed        Paul Ibrahim MD

## 2024-10-08 DIAGNOSIS — I48.91 ATRIAL FIBRILLATION, UNSPECIFIED TYPE (MULTI): ICD-10-CM

## 2024-10-10 ENCOUNTER — APPOINTMENT (OUTPATIENT)
Dept: PRIMARY CARE | Facility: CLINIC | Age: 74
End: 2024-10-10
Payer: MEDICARE

## 2024-10-10 VITALS
WEIGHT: 143 LBS | SYSTOLIC BLOOD PRESSURE: 100 MMHG | DIASTOLIC BLOOD PRESSURE: 58 MMHG | BODY MASS INDEX: 21.18 KG/M2 | TEMPERATURE: 98 F | RESPIRATION RATE: 14 BRPM | OXYGEN SATURATION: 94 % | HEIGHT: 69 IN | HEART RATE: 60 BPM

## 2024-10-10 DIAGNOSIS — J96.12 CHRONIC RESPIRATORY FAILURE WITH HYPOXIA AND HYPERCAPNIA (MULTI): ICD-10-CM

## 2024-10-10 DIAGNOSIS — J41.8 MIXED SIMPLE AND MUCOPURULENT CHRONIC BRONCHITIS (MULTI): ICD-10-CM

## 2024-10-10 DIAGNOSIS — I25.10 CORONARY ARTERY CALCIFICATION: ICD-10-CM

## 2024-10-10 DIAGNOSIS — R35.0 BENIGN PROSTATIC HYPERPLASIA WITH URINARY FREQUENCY: ICD-10-CM

## 2024-10-10 DIAGNOSIS — R73.9 ELEVATED BLOOD SUGAR: ICD-10-CM

## 2024-10-10 DIAGNOSIS — N40.1 BENIGN PROSTATIC HYPERPLASIA WITH URINARY FREQUENCY: ICD-10-CM

## 2024-10-10 DIAGNOSIS — D64.9 ANEMIA, UNSPECIFIED TYPE: ICD-10-CM

## 2024-10-10 DIAGNOSIS — J96.11 CHRONIC RESPIRATORY FAILURE WITH HYPOXIA AND HYPERCAPNIA (MULTI): ICD-10-CM

## 2024-10-10 DIAGNOSIS — H61.92 LESION OF EXTERNAL EAR CANAL, LEFT: ICD-10-CM

## 2024-10-10 DIAGNOSIS — R73.9 HYPERGLYCEMIA: ICD-10-CM

## 2024-10-10 DIAGNOSIS — F17.200 SMOKER: ICD-10-CM

## 2024-10-10 DIAGNOSIS — I48.0 PAROXYSMAL ATRIAL FIBRILLATION (MULTI): Primary | ICD-10-CM

## 2024-10-10 DIAGNOSIS — C61 PROSTATE CANCER (MULTI): ICD-10-CM

## 2024-10-10 PROCEDURE — 1160F RVW MEDS BY RX/DR IN RCRD: CPT | Performed by: INTERNAL MEDICINE

## 2024-10-10 PROCEDURE — 1159F MED LIST DOCD IN RCRD: CPT | Performed by: INTERNAL MEDICINE

## 2024-10-10 PROCEDURE — 1123F ACP DISCUSS/DSCN MKR DOCD: CPT | Performed by: INTERNAL MEDICINE

## 2024-10-10 PROCEDURE — 99214 OFFICE O/P EST MOD 30 MIN: CPT | Performed by: INTERNAL MEDICINE

## 2024-10-10 PROCEDURE — 3008F BODY MASS INDEX DOCD: CPT | Performed by: INTERNAL MEDICINE

## 2024-10-10 PROCEDURE — 1158F ADVNC CARE PLAN TLK DOCD: CPT | Performed by: INTERNAL MEDICINE

## 2024-10-10 ASSESSMENT — ENCOUNTER SYMPTOMS
COUGH: 1
SHORTNESS OF BREATH: 1

## 2024-10-10 NOTE — PROGRESS NOTES
"Subjective   Patient ID: Raheel Cloud is a 74 y.o. male who presents for Follow-up.    He continues to do fairly well.  He does have chronic respiratory failure due to severe emphysema/COPD.  He does have a chronic cough.  Symptoms are currently stable.  He does have difficulty even with ADLs.  He continues to deal with depression as well ever since the loss of his wife.  He does have good family support.  He does not wish for any medicines at the current time.    Review of Systems   Respiratory:  Positive for cough and shortness of breath.        Objective   /58 (BP Location: Left arm, Patient Position: Sitting, BP Cuff Size: Adult)   Pulse 60   Temp 36.7 °C (98 °F) (Tympanic)   Resp 14   Ht 1.753 m (5' 9\")   Wt 64.9 kg (143 lb)   SpO2 94% Comment: on 2 lmp o2  BMI 21.12 kg/m²     Physical Exam  Vitals reviewed.   Constitutional:       Appearance: Normal appearance.   HENT:      Head: Normocephalic.   Cardiovascular:      Rate and Rhythm: Normal rate.   Pulmonary:      Effort: Pulmonary effort is normal.   Musculoskeletal:         General: Normal range of motion.   Neurological:      General: No focal deficit present.      Mental Status: He is alert.   Psychiatric:         Mood and Affect: Mood normal.         Assessment/Plan   Problem List Items Addressed This Visit             ICD-10-CM    Prostate cancer (Multi) C61    Relevant Orders    PSA    BPH (benign prostatic hyperplasia) N40.0    Smoker F17.200    Chronic obstructive pulmonary disease (Multi) J44.9    Paroxysmal atrial fibrillation (Multi) - Primary I48.0    Relevant Orders    Thyroid Stimulating Hormone    Hyperglycemia R73.9    Relevant Orders    Comprehensive Metabolic Panel    Hemoglobin A1C    Coronary artery calcification I25.10    Relevant Orders    Lipid Panel     Other Visit Diagnoses         Codes    Anemia, unspecified type     D64.9    Relevant Orders    CBC    Folate    Iron and TIBC    Vitamin B12    Elevated blood sugar     " R73.9    Lesion of external ear canal, left     H61.92    Chronic respiratory failure with hypoxia and hypercapnia (Multi)     J96.11, J96.12        We discussed all the above.  He has been fairly stable all things considered.  In particular has had no recent A-fib with RVR events and his breathing has been without any acute decompensation.  Because of this he is understandably reluctant to change his medications.  In this regard, we discussed changing Bystolic to metoprolol to allow a little bit higher blood pressure, but again he is reluctant to do this feeling fairly well.  We also discussed starting Breztri to get away from the shorter duration acting medications.  However, he is also reluctant to do this.  We discussed the pros and cons and we will leave it up to him to change these medicines when he feels comfortable.  We did discuss his mood and depression and he feels he is handling things fairly well all things considered.  Unfortunately continues to smoke and we discussed the detriment in regards to this.  Will continue to monitor his ongoing metabolic risks as well as his blood counts.  Will follow-up in a few months, sooner if any issues or changes

## 2024-10-16 ENCOUNTER — TRANSCRIBE ORDERS (OUTPATIENT)
Dept: CARDIAC REHAB | Facility: CLINIC | Age: 74
End: 2024-10-16
Payer: MEDICARE

## 2024-10-16 DIAGNOSIS — J44.9 CHRONIC OBSTRUCTIVE PULMONARY DISEASE, UNSPECIFIED COPD TYPE (MULTI): Primary | ICD-10-CM

## 2024-10-24 ENCOUNTER — CLINICAL SUPPORT (OUTPATIENT)
Dept: CARDIAC REHAB | Facility: CLINIC | Age: 74
End: 2024-10-24
Payer: MEDICARE

## 2024-10-24 DIAGNOSIS — J44.9 CHRONIC OBSTRUCTIVE PULMONARY DISEASE, UNSPECIFIED COPD TYPE (MULTI): Primary | ICD-10-CM

## 2024-10-24 NOTE — PROGRESS NOTES
"INDIVIDUAL PULMONARY TREATMENT PLAN-INITIAL ASSESSMENT     Name: Raheel Cloud    Today's Date: 10/24/24   : 1950    Primary Provider: KEEGAN Rock  MRN: [id number]    Referring Physician: ROMMEL Velazquez     Diagnosis: COPD    Onset Date:        OXYGEN ASSESSMENT  SPO2 Rest: 98% SPO2 Exertion: 98%  Using supplemental O2: Yes  Liters rest: 2-2.5 L Liters exertion: 3 L  Demonstrates appropriate knowledge of O2 use: Yes  Demonstrates appropriate knowledge of O2 safety: Yes  Home O2 system: Concentrator  Portable O2: POC, backpack style  Home pulse oximeter: Yes, on oxygen, usually is 96-98%    Initial MMRC score: 3  Discharge MMRC score:     OXYGEN PLAN   Oxygen Goals:  1. Decrease MMRC score at discharge.  2. Decrease overall dyspnea during exercise using management techniques by discharge.     Oxygen Intervention/Education:   *Titrate supplemental oxygen if needed to maintain SPO2 greater than 88%.  *Monitor SPO2 pre, during and post exercise.      PSYCHOSOCIAL ASSESSMENT  Patient reported stress level: severe. Grief, health  Using stress management skills: Yes, watch TV, visit family, smoke (used to golf and travel)  HX of anxiety: No  HX of depression: No; Pt denies depression but is in active grief from loss of wife 5 months ago  Patient reported any new stress, depression and anxiety symptoms: No    Family/Support System: son, step-mother, 2 daughters, extended family  Seeing mental health provider: No  Psychosocial medications: Denies    Initial PHQ-9 score: 18 \"Moderately Severe Risk\"  [place] PHQ-9 score:  Discharge PHQ-9 score:   Was PHQ-9 sent to provider: No  Date sent:    Initial CAT score: 24  Discharge CAT score:     Stages of change: Precontemplation    PSYCHOSOCIAL PLAN  Psychosocial Goals:  1. Decrease PHQ9 category classification from\"Moderately Severe Risk\" to \"Moderate Risk\" while in the program.  2. Improve emotional health and gain confidence while in the program.     Psychosocial " Interventions/ Education:  *Provided one on one emotional support and will facilitate peer support within the context of other phase II patients while in the program.       OTHER CORE COMPONENTS/ RISK FACTORS ASSESSMENT  Medication compliance: good compliance  Inhaler use: Yes, PRN  Using pill box: No  Carries medication list: No  Comments:     Bronchial hygiene:  Cough: Moderately productive cough  Comments: clear, nickel size    Blood Pressure Management:  History of High BP: No; on Bystolic for high HR  Resting BP: 108/56    Tobacco: SMOKER  Form of tobacco: Cigarettes 1 PPD  Quit Date:   Anyone in the house smoke: Yes, self    Initial Knowledge Test Score: 15/15  Discharge Knowledge Test Score:    OTHER CORE COMPONENTS/ RISK FACTOR PLAN   Other Core components/Risk Factor Goals:  1. Set quit date for smoking cessation by discharge.   2. Gain knowledge of pulmonary disease and chronic disease management prior to discharge.    Other Components/ Risk Factors Intervention/Education:  *Will continue to monitor HR, BP, and SPO2 each session.  *Encourage review of educational materials.    NUTRITION ASSESSMENT  Diabetes: No  HgbA1c: 5.7%  Date Checked:  Monitors glucose at home: No  Fasting Blood Sugar Range:  Frequency:  Hypoglycemic Episode: Denies    Weight Management  Weight:  146 lbs  Height: 69 inches   BMI:  21.6  Current Diet: regular  Barriers to dietary change: Denies; lots of food I don't like    Initial Dietary Assessment Score: Pending results from dietician   Discharge Dietary Assessment Score:     NUTRITION PLAN  Nutrition Goals:   1. Improve Picture Your Plate assessment results by discharge.  2. Make lifestyle changes to diet to include healthy pulmonary options while in the program.     Nutrition Intervention/Education:   *Sent dietician Picture Your Plate assessment for scoring and recommendations.   *Encourage attendance to dietician lectures.       EXERCISE ASSESSMENT  Home Exercise?:  No  Frequency:   Mode:  *Pt has been unable to exercise since wife passed in 5/24 with recurrent hospitalizations for A. Fib with RVR and just now stable enough to resume exercise--wanted to wait and start with phase 2 so he could be monitored.  Still nervous about going into A. Fib again with exertion.    Initial 6 Minute Walk Test Assessment:  Distance (meters): 227 meters  FiO2: 3 L NC SPO2: 98%      EXERCISE PLAN  Exercise Goals:   1. Goal of 3.7 METs by discharge.  2. Six minute walk test increased by 30 meters or greater at discharge.  3. Have a plan in place for continued exercise after the program by discharge.    Exercise Prescription:   Based on 6 Minute Walk Test on 10/24/24  Frequency: 3 days per week  Duration: 30 minutes  Intensity RPE: 11-14  Target HR: 101-133  MET Level Range: 1.7-2.4    Prescribed oxygen flow rate (l/m): 3L NC  Prescribed SPO2 parameters: >88%       Modality METS Load  Duration   1 Warm Up    05:00   2 NuStep 1.7 20 valenzuela Level 2 07:30   3 Arms Airdyne 1.4 0.2 load  07:30   4 Recumbent Bike 2.4 10 valenzuela  07:30   5 Bands    07:30   7 Cool Down     05:00     Exercise Intervention/Education:   *Aim to progress 1 MET every 6-8 weeks or as tolerated.  *Incorporate resistance training for muscular endurance and strength.    Date of first exercise session: Pending MD approval    LEARNING ASSESSMENT & BARRIERS  Readiness to Learn: Eager to learn  Barriers: reading glasses  Comments:    FALL RISK  moderate  Comments:  Pt walked with steady gait on track, pulling oxygen tank behind him.    INDIVIDUAL PATIENT GOALS   Improve fitness to increase walking distance by discharge.  2.    Add resistance training to increase strength while in the program.    MEDICATIONS  Albuterol sulfate 90mcg 2 puffs PRN; Ammonium lactate 12% PRN; Atorvastatin 40mg daily; Pulmicort 1mg nebulization BID; Budesonide/glycopyr/formoterol 160-9-4.8mcg 2 puffs BID; Fluticasone 50mcg 2 sprays/nostril BID; Duoneb  0.5-2.5mg/3mL QID, 20-100mcg/actuation mist 1 puff PRN; Nebivolol 2.5mg BID; Zofran 8mg PRN; Oxygen 3L/24hrs; Rivaroxaban 15mg daily in evening; Roflumilast 500mg daiy; Triamcinolone acetonide 0.1% BID PRN    STAFF COMMENTS:   Here for orientation into phase 2 pulmonary rehab with dx of COPD. Explanation of 6 Minute Walk Test, RPE and RPD scales provided with verbalized understanding. Telemetry showing SR with PACS noted. Patient was hospitalized in ICU for 10 days in July for Afib with RVR.  Patient walked with steady gait, did well pacing.  Walked 4 laps+105 ft =745 ft (227m, 1.4mph, 2.1 METs).  Reports 5/10 moderate dyspnea with saturations 98% on 3L NC. Denies any pain or discomfort. Recovers appropriately. Planning to participate in 1pm class, will continue with exercise sessions pending physician signature on initial ITP.

## 2024-10-24 NOTE — PROGRESS NOTES
PULMONARY ASSESSMENT    Name: Raheel Cloud   : 1950   Diagnosis: COPD  MRN: [id number]   Onset Date:    Today's Date: 10/24/24      Respiratory  HX: Emphysema , Covid 19 , recurrent bronchitis, bronchiectasis flare-ups  Dyspnea: Yes  Describe: sever dyspnea on exertion (would have dysnea all the time if did not do breathing treatments; with inhalers; does not have dyspnea at rest)  HX FRANKO: No  CPAP Use: No  Family History of Lung Disease: No  Finger Clubbing: No  Hemoptysis: No  Tobacco Use: Current   Other forms of tobacco: Cigarettes 1 PPD X 50 yrs  Anyone in the house smoke: Yes, self  Number of colds per year: 1  Number of hospitalizations in past year: 4   Trouble sleeping: Denies  Limitations with ADLs: yes, can't do house work or yard work, no issues dressing or bathing  Others close to you affected by your health: Yes, kids have to help him  Activities you would like to do that your lung problem prevents you from doing: getting out of house, being social, golf  Exposure to asbestos/plastics/fumes/mining dust: Yes    Flu Vaccine: Yes  Covid Vaccine: Yes  Pneumonia Vaccine: Yes  Shingles Vaccine: No    Cough: moderate, productive, clear mucous, nickel size  Resting O2 sat: 98%  Uses O2: Yes,   Liters: 3 L NC  When do you use O2?: 24 hours/day except when smoking    Lung Sounds: Clear  Locations: all lobes    Comments:    Neurological   Orientation: oriented to person, place, time, and general circumstances  HX: tremors from breathing medications  History of stroke/TIA?: Denies    Comments:    Cardiovascular   HX:   Angina: none  Describe:  History of Heart Failure: No  EF: 55%    Devices: Denies  HX of PAD: No  Arrythmias: A fib with RVR (3 admissions in )    Apical: regular  Heart Rate: 77  BP: 108/56    Radial pulses:  R Present 2+ L Present 2+    Comments:    Skin  Skin Color: pink, warm, dry  Edema: 1+ bilaterally LE    Comments: very dry skin    Gastrointestinal/Genitourinary  HX:  prostate cancer 5 yrs ago  Comments: umbilical hernia repaired      Psychosocial  Marital status:   Children: 3  Lives alone:  Yes  Lives with:  Drives:  Yes  Occupation: Retired ER MD   Caretaker of family member?:  No  Do you feel safe at home?:  Yes    Caffeinated drinks per day: 2 cups coffee  Alcoholic drinks per day/week: 0  HX drug or alcohol abuse: No  Current use of illicit drugs: No  Marijuana use: No    Comments:    Musculoskeletal  HX of injury/surgery: Denies    Comments:    Pain Assessment  Current pain: Denies  Location:  Description:    Comments:    Fall Risk Assessment  Assistive device: no device  Needs assistance:  No  Afraid of falling:  No  Fall within the past 6 months?: No  Injured with fall:  Fall risk results: moderate

## 2024-10-24 NOTE — PROGRESS NOTES
"INDIVIDUAL PULMONARY TREATMENT PLAN-INITIAL ASSESSMENT     Name: Raheel Cloud    Today's Date: 10/24/24   : 1950    Primary Provider: KEEGAN Rock  MRN: [id number]    Referring Physician: ROMMEL Velazquez     Diagnosis: COPD    Onset Date:        OXYGEN ASSESSMENT  SPO2 Rest: 98% SPO2 Exertion: 98%  Using supplemental O2: Yes  Liters rest: 2-2.5 L Liters exertion: 3 L  Demonstrates appropriate knowledge of O2 use: Yes  Demonstrates appropriate knowledge of O2 safety: Yes  Home O2 system: Concentrator  Portable O2: POC, backpack style  Home pulse oximeter: Yes, on oxygen, usually is 96-98%    Initial MMRC score: 3  Discharge MMRC score:     OXYGEN PLAN   Oxygen Goals:  1. Decrease MMRC score at discharge.  2. Decrease overall dyspnea during exercise using management techniques by discharge.     Oxygen Intervention/Education:   *Titrate supplemental oxygen if needed to maintain SPO2 greater than 88%.  *Monitor SPO2 pre, during and post exercise.      PSYCHOSOCIAL ASSESSMENT  Patient reported stress level: severe. Grief, health  Using stress management skills: Yes, watch TV, visit family, smoke (used to golf and travel)  HX of anxiety: No  HX of depression: No; Pt denies depression but is in active grief from loss of wife 5 months ago  Patient reported any new stress, depression and anxiety symptoms: No    Family/Support System: son, step-mother, 2 daughters, extended family  Seeing mental health provider: No  Psychosocial medications: Denies    Initial PHQ-9 score: 18 \"Moderately Severe Risk\"  [place] PHQ-9 score:  Discharge PHQ-9 score:   Was PHQ-9 sent to provider: No  Date sent:    Initial CAT score: 24  Discharge CAT score:     Stages of change: Precontemplation    PSYCHOSOCIAL PLAN  Psychosocial Goals:  1. Decrease PHQ9 category classification from\"Moderately Severe Risk\" to \"Moderate Risk\" while in the program.  2. Improve emotional health and gain confidence while in the program.     Psychosocial " Interventions/ Education:  *Provided one on one emotional support and will facilitate peer support within the context of other phase II patients while in the program.       OTHER CORE COMPONENTS/ RISK FACTORS ASSESSMENT  Medication compliance: good compliance  Inhaler use: Yes, PRN  Using pill box: No  Carries medication list: No  Comments:     Bronchial hygiene:  Cough: Moderately productive cough  Comments: clear, nickel size    Blood Pressure Management:  History of High BP: No; on Bystolic for high HR  Resting BP: 108/56    Tobacco: SMOKER  Form of tobacco: Cigarettes 1 PPD  Quit Date:   Anyone in the house smoke: Yes, self    Initial Knowledge Test Score: 15/15  Discharge Knowledge Test Score:    OTHER CORE COMPONENTS/ RISK FACTOR PLAN   Other Core components/Risk Factor Goals:  1. Set quit date for smoking cessation by discharge.   2. Gain knowledge of pulmonary disease and chronic disease management prior to discharge.    Other Components/ Risk Factors Intervention/Education:  *Will continue to monitor HR, BP, and SPO2 each session.  *Encourage review of educational materials.    NUTRITION ASSESSMENT  Diabetes: No  HgbA1c: 5.7%  Date Checked:  Monitors glucose at home: No  Fasting Blood Sugar Range:  Frequency:  Hypoglycemic Episode: Denies    Weight Management  Weight:  146 lbs  Height: 69 inches   BMI:  21.6  Current Diet: regular  Barriers to dietary change: Denies; lots of food I don't like    Initial Dietary Assessment Score: Pending results from dietician   Discharge Dietary Assessment Score:     NUTRITION PLAN  Nutrition Goals:   1. Improve Picture Your Plate assessment results by discharge.  2. Make lifestyle changes to diet to include healthy pulmonary options while in the program.     Nutrition Intervention/Education:   *Sent dietician Picture Your Plate assessment for scoring and recommendations.   *Encourage attendance to dietician lectures.       EXERCISE ASSESSMENT  Home Exercise?:  No  Frequency:   Mode:  *Pt has been unable to exercise since wife passed in 5/24 with recurrent hospitalizations for A. Fib with RVR and just now stable enough to resume exercise--wanted to wait and start with phase 2 so he could be monitored.  Still nervous about going into A. Fib again with exertion.    Initial 6 Minute Walk Test Assessment:  Distance (meters): 227 meters  FiO2: 3 L NC SPO2: 98%      EXERCISE PLAN  Exercise Goals:   1. Goal of 3.7 METs by discharge.  2. Six minute walk test increased by 30 meters or greater at discharge.  3. Have a plan in place for continued exercise after the program by discharge.    Exercise Prescription:   Based on 6 Minute Walk Test on 10/24/24  Frequency: 3 days per week  Duration: 30 minutes  Intensity RPE: 11-14  Target HR: 101-133  MET Level Range: 1.7-2.4    Prescribed oxygen flow rate (l/m): 3L NC  Prescribed SPO2 parameters: >88%       Modality METS Load  Duration   1 Warm Up    05:00   2 NuStep 1.7 20 valenzuela Level 2 07:30   3 Arms Airdyne 1.4 0.2 load  07:30   4 Recumbent Bike 2.4 10 valenzuela  07:30   5 Bands    07:30   7 Cool Down     05:00     Exercise Intervention/Education:   *Aim to progress 1 MET every 6-8 weeks or as tolerated.  *Incorporate resistance training for muscular endurance and strength.    Date of first exercise session: Pending MD approval    LEARNING ASSESSMENT & BARRIERS  Readiness to Learn: Eager to learn  Barriers: reading glasses  Comments:    FALL RISK  moderate  Comments:  Pt walked with steady gait on track, pulling oxygen tank behind him.    INDIVIDUAL PATIENT GOALS   Improve fitness to increase walking distance by discharge.  2.    Add resistance training to increase strength while in the program.    MEDICATIONS  Albuterol sulfate 90mcg 2 puffs PRN; Ammonium lactate 12% PRN; Atorvastatin 40mg daily; Pulmicort 1mg nebulization BID; Budesonide/glycopyr/formoterol 160-9-4.8mcg 2 puffs BID; Fluticasone 50mcg 2 sprays/nostril BID; Duoneb  0.5-2.5mg/3mL QID, 20-100mcg/actuation mist 1 puff PRN; Nebivolol 2.5mg BID; Zofran 8mg PRN; Oxygen 3L/24hrs; Rivaroxaban 15mg daily in evening; Roflumilast 500mg daiy; Triamcinolone acetonide 0.1% BID PRN    STAFF COMMENTS:   Here for orientation into phase 2 pulmonary rehab with dx of COPD. Explanation of 6 Minute Walk Test, RPE and RPD scales provided with verbalized understanding. Telemetry showing SR with PACS noted. Patient was hospitalized in ICU for 10 days in July for Afib with RVR.  Patient walked with steady gait, did well pacing.  Walked 4 laps+105 ft =745 ft (227m, 1.4mph, 2.1 METs).  Reports 5/10 moderate dyspnea with saturations 98% on 3L NC. Denies any pain or discomfort. Recovers appropriately. Planning to participate in 1pm class, will continue with exercise sessions pending physician signature on initial ITP.       ____________________________________________________________________________  Dr. ROMMEL Velazquez    Date:   Time:

## 2024-10-28 ENCOUNTER — CLINICAL SUPPORT (OUTPATIENT)
Dept: CARDIAC REHAB | Facility: CLINIC | Age: 74
End: 2024-10-28
Payer: MEDICARE

## 2024-10-28 DIAGNOSIS — J44.9 CHRONIC OBSTRUCTIVE PULMONARY DISEASE, UNSPECIFIED COPD TYPE (MULTI): ICD-10-CM

## 2024-10-28 PROCEDURE — 94625 PHY/QHP OP PULM RHB W/O MNTR: CPT | Performed by: INTERNAL MEDICINE

## 2024-10-30 ENCOUNTER — CLINICAL SUPPORT (OUTPATIENT)
Dept: CARDIAC REHAB | Facility: CLINIC | Age: 74
End: 2024-10-30
Payer: MEDICARE

## 2024-10-30 DIAGNOSIS — J44.9 CHRONIC OBSTRUCTIVE PULMONARY DISEASE, UNSPECIFIED COPD TYPE (MULTI): ICD-10-CM

## 2024-10-30 PROCEDURE — 94625 PHY/QHP OP PULM RHB W/O MNTR: CPT | Performed by: INTERNAL MEDICINE

## 2024-11-01 ENCOUNTER — CLINICAL SUPPORT (OUTPATIENT)
Dept: CARDIAC REHAB | Facility: CLINIC | Age: 74
End: 2024-11-01
Payer: MEDICARE

## 2024-11-01 DIAGNOSIS — J44.9 CHRONIC OBSTRUCTIVE PULMONARY DISEASE, UNSPECIFIED COPD TYPE (MULTI): ICD-10-CM

## 2024-11-01 PROCEDURE — 94625 PHY/QHP OP PULM RHB W/O MNTR: CPT | Performed by: INTERNAL MEDICINE

## 2024-11-04 ENCOUNTER — CLINICAL SUPPORT (OUTPATIENT)
Dept: CARDIAC REHAB | Facility: CLINIC | Age: 74
End: 2024-11-04
Payer: MEDICARE

## 2024-11-04 DIAGNOSIS — J44.9 CHRONIC OBSTRUCTIVE PULMONARY DISEASE, UNSPECIFIED COPD TYPE (MULTI): ICD-10-CM

## 2024-11-04 PROCEDURE — 94625 PHY/QHP OP PULM RHB W/O MNTR: CPT | Performed by: INTERNAL MEDICINE

## 2024-11-06 ENCOUNTER — CLINICAL SUPPORT (OUTPATIENT)
Dept: CARDIAC REHAB | Facility: CLINIC | Age: 74
End: 2024-11-06
Payer: MEDICARE

## 2024-11-06 DIAGNOSIS — J44.9 CHRONIC OBSTRUCTIVE PULMONARY DISEASE, UNSPECIFIED COPD TYPE (MULTI): ICD-10-CM

## 2024-11-06 PROCEDURE — 94625 PHY/QHP OP PULM RHB W/O MNTR: CPT | Performed by: INTERNAL MEDICINE

## 2024-11-08 ENCOUNTER — CLINICAL SUPPORT (OUTPATIENT)
Dept: CARDIAC REHAB | Facility: CLINIC | Age: 74
End: 2024-11-08
Payer: MEDICARE

## 2024-11-08 DIAGNOSIS — J44.9 CHRONIC OBSTRUCTIVE PULMONARY DISEASE, UNSPECIFIED COPD TYPE (MULTI): ICD-10-CM

## 2024-11-08 PROCEDURE — 94625 PHY/QHP OP PULM RHB W/O MNTR: CPT | Performed by: INTERNAL MEDICINE

## 2024-11-11 ENCOUNTER — CLINICAL SUPPORT (OUTPATIENT)
Dept: CARDIAC REHAB | Facility: CLINIC | Age: 74
End: 2024-11-11
Payer: MEDICARE

## 2024-11-11 DIAGNOSIS — J44.9 CHRONIC OBSTRUCTIVE PULMONARY DISEASE, UNSPECIFIED COPD TYPE (MULTI): ICD-10-CM

## 2024-11-11 PROCEDURE — 94625 PHY/QHP OP PULM RHB W/O MNTR: CPT | Performed by: INTERNAL MEDICINE

## 2024-11-13 ENCOUNTER — CLINICAL SUPPORT (OUTPATIENT)
Dept: CARDIAC REHAB | Facility: CLINIC | Age: 74
End: 2024-11-13
Payer: MEDICARE

## 2024-11-13 DIAGNOSIS — J44.9 CHRONIC OBSTRUCTIVE PULMONARY DISEASE, UNSPECIFIED COPD TYPE (MULTI): ICD-10-CM

## 2024-11-13 PROCEDURE — 94625 PHY/QHP OP PULM RHB W/O MNTR: CPT | Performed by: INTERNAL MEDICINE

## 2024-11-15 ENCOUNTER — CLINICAL SUPPORT (OUTPATIENT)
Dept: CARDIAC REHAB | Facility: CLINIC | Age: 74
End: 2024-11-15
Payer: MEDICARE

## 2024-11-15 DIAGNOSIS — J44.9 CHRONIC OBSTRUCTIVE PULMONARY DISEASE, UNSPECIFIED COPD TYPE (MULTI): ICD-10-CM

## 2024-11-15 PROCEDURE — 94625 PHY/QHP OP PULM RHB W/O MNTR: CPT | Performed by: INTERNAL MEDICINE

## 2024-11-18 ENCOUNTER — CLINICAL SUPPORT (OUTPATIENT)
Dept: CARDIAC REHAB | Facility: CLINIC | Age: 74
End: 2024-11-18
Payer: MEDICARE

## 2024-11-18 DIAGNOSIS — J44.9 CHRONIC OBSTRUCTIVE PULMONARY DISEASE, UNSPECIFIED COPD TYPE (MULTI): ICD-10-CM

## 2024-11-18 PROCEDURE — 94625 PHY/QHP OP PULM RHB W/O MNTR: CPT | Performed by: INTERNAL MEDICINE

## 2024-11-20 ENCOUNTER — CLINICAL SUPPORT (OUTPATIENT)
Dept: CARDIAC REHAB | Facility: CLINIC | Age: 74
End: 2024-11-20
Payer: MEDICARE

## 2024-11-20 DIAGNOSIS — J44.9 CHRONIC OBSTRUCTIVE PULMONARY DISEASE, UNSPECIFIED COPD TYPE (MULTI): ICD-10-CM

## 2024-11-20 PROCEDURE — 94625 PHY/QHP OP PULM RHB W/O MNTR: CPT | Performed by: INTERNAL MEDICINE

## 2024-11-22 ENCOUNTER — CLINICAL SUPPORT (OUTPATIENT)
Dept: CARDIAC REHAB | Facility: CLINIC | Age: 74
End: 2024-11-22
Payer: MEDICARE

## 2024-11-22 DIAGNOSIS — J44.9 CHRONIC OBSTRUCTIVE PULMONARY DISEASE, UNSPECIFIED COPD TYPE (MULTI): ICD-10-CM

## 2024-11-22 PROCEDURE — 94625 PHY/QHP OP PULM RHB W/O MNTR: CPT | Performed by: INTERNAL MEDICINE

## 2024-11-22 NOTE — PROGRESS NOTES
"INDIVIDUAL PULMONARY TREATMENT PLAN-30 DAY REASSESSMENT      Name: Raheel Cloud    Today's Date: 24   : 1950    Primary Provider: KEEGAN Rock  MRN: [id number]    Referring Physician: ROMMEL Velazquez     Diagnosis: COPD    Onset Date:        OXYGEN ASSESSMENT  SPO2 Rest: 100% SPO2 Exertion: 94%  Using supplemental O2: Yes  Liters rest: 2-2.5 L Liters exertion: 2-3 L  Demonstrates appropriate knowledge of O2 use: Yes  Demonstrates appropriate knowledge of O2 safety: Yes  Home O2 system: Concentrator  Portable O2: POC, backpack style  Home pulse oximeter: Yes, on oxygen, usually is 96-98%    Initial MMRC score: 3  Discharge MMRC score:     OXYGEN PLAN   Oxygen Goals:  1. Decrease MMRC score at discharge. Will reassess at discharge.   2. Decrease overall dyspnea during exercise using management techniques by discharge. Will reassess at discharge.     Oxygen Intervention/Education:   *Titrate supplemental oxygen if needed to maintain SPO2 greater than 88%.  *Monitor SPO2 pre, during and post exercise.      PSYCHOSOCIAL ASSESSMENT  Patient reported stress level: severe. Grief, health  Using stress management skills: Yes, watch TV, visit family, smoking (used to golf and travel)  HX of anxiety: No  HX of depression: No; Pt denies depression but is in active grief from loss of wife 5 months ago  Patient reported any new stress, depression and anxiety symptoms: No    Family/Support System: son, step-mother, 2 daughters, extended family  Seeing mental health provider: No  Psychosocial medications: Denies    Initial PHQ-9 score: 18 \"Moderately Severe Risk\"  [place] PHQ-9 score:  Discharge PHQ-9 score:   Was PHQ-9 sent to provider: No  Date sent:    Initial CAT score: 24  Discharge CAT score:     Stages of change: Precontemplation    PSYCHOSOCIAL PLAN  Psychosocial Goals:  1. Decrease PHQ9 category classification from\"Moderately Severe Risk\" to \"Moderate Risk\" while in the program. Will reassess at discharge.   2. " "Improve emotional health and gain confidence while in the program. Pt states he is glad to be back in the program after enjoying it so much in the past.    Psychosocial Interventions/ Education:  *Provided one on one emotional support and will facilitate peer support within the context of other phase II patients while in the program.       OTHER CORE COMPONENTS/ RISK FACTORS ASSESSMENT  Medication compliance: good compliance  Inhaler use: Yes, PRN  Using pill box: No  Carries medication list: No  Comments:     Bronchial hygiene:  Cough: Moderately productive cough  Comments: clear, nickel size    Blood Pressure Management:  History of High BP: No; on Bystolic for high HR  Resting BP: 122/50    Tobacco: SMOKER  Form of tobacco: Cigarettes 1 PPD  Quit Date:   Anyone in the house smoke: Yes, self    Initial Knowledge Test Score: 15/15  Discharge Knowledge Test Score:    OTHER CORE COMPONENTS/ RISK FACTOR PLAN   Other Core components/Risk Factor Goals:  1. Set quit date for smoking cessation by discharge. Will continue to educate and support smoking cessation.  2. Gain knowledge of pulmonary disease and chronic disease management prior to discharge. Will reassess at discharge.     Other Components/ Risk Factors Intervention/Education:  *Will continue to monitor HR, BP, and SPO2 each session.  *Encourage review of educational materials.    NUTRITION ASSESSMENT  Diabetes: No  HgbA1c: 5.7%  Date Checked:  Monitors glucose at home: No  Fasting Blood Sugar Range:  Frequency:  Hypoglycemic Episode: Denies    Weight Management  Weight:  142 lbs  Height: 69 inches   BMI:  21.6  Current Diet: regular  Barriers to dietary change: \"Difficult to meal plan for one person, lots of food I don't like\"    Initial Dietary Assessment Score: 55  Discharge Dietary Assessment Score:     NUTRITION PLAN  Nutrition Goals:   1. Improve Picture Your Plate assessment results by discharge. Will reassess at discharge.   2. Make lifestyle changes to " diet to include healthy pulmonary options while in the program. Encouraged pt to decrease intake of sodium and red meat.     Nutrition Intervention/Education:   *Sent dietician Picture Your Plate assessment for scoring and recommendations.   *Encourage attendance to dietician lectures.       EXERCISE ASSESSMENT  Home Exercise?: No  Frequency:   Mode:  *Pt has been unable to exercise since wife passed in 5/24 with recurrent hospitalizations for A. Fib with RVR and just now stable enough to resume exercise--wanted to wait and start with phase 2 so he could be monitored.  Still nervous about going into A. Fib again with exertion.    Initial 6 Minute Walk Test Assessment:  Distance (meters): 227 meters  FiO2: 3 L NC SPO2: 98%      EXERCISE PLAN  Exercise Goals:   1. Goal of 3.7 METs by discharge. Goal met with peak of 4.2 METs, new goal set of 4.5 METs by discharge.  2. Six minute walk test increased by 30 meters or greater at discharge. Will reassess at midway.  3. Have a plan in place for continued exercise after the program by discharge. Will reassess at discharge.     Exercise Prescription:   Based on 6 Minute Walk Test on 10/24/24  Frequency: 3 days per week  Duration: 30 minutes  Intensity RPE: 11-14  Target HR: 101-133  MET Level Range: 2.7-4.2    Prescribed oxygen flow rate (l/m): 3L NC  Prescribed SPO2 parameters: >88%       Modality METS Load  Duration   1 Warm Up    05:00   2 NuStep 2.7 52 valenzuela Level 3 07:30   3 Upright Bike 4.2 44 valenzuela Level 1 07:30   4 Recumbent Bike 2.4 10 valenzuela  07:30   5 Bands    07:30   7 Cool Down     05:00     Exercise Intervention/Education:   *Aim to progress 1 MET every 6-8 weeks or as tolerated.  *Incorporate resistance training for muscular endurance and strength.    Date of first exercise session: Pending MD approval    LEARNING ASSESSMENT & BARRIERS  Readiness to Learn: Eager to learn  Barriers: reading glasses  Comments:    FALL RISK  moderate  Comments:  Pt walked with  steady gait on track, pulling oxygen tank behind him.    INDIVIDUAL PATIENT GOALS   Improve fitness to increase walking distance by discharge.  2.    Add resistance training to increase strength while in the program.    MEDICATIONS  Albuterol sulfate 90mcg 2 puffs PRN; Ammonium lactate 12% PRN; Atorvastatin 40mg daily; Pulmicort 1mg nebulization BID; Budesonide/glycopyr/formoterol 160-9-4.8mcg 2 puffs BID; Fluticasone 50mcg 2 sprays/nostril BID; Duoneb 0.5-2.5mg/3mL QID, 20-100mcg/actuation mist 1 puff PRN; Nebivolol 2.5mg BID; Zofran 8mg PRN; Oxygen 3L/24hrs; Rivaroxaban 15mg daily in evening; Roflumilast 500mg daiy; Triamcinolone acetonide 0.1% BID PRN    STAFF COMMENTS:   James has completed 12/36 sessions thus far in pulmonary rehab. Pt states he is glad to be back in the program after how much he benefited from the program last year. Pt has significantly declined in his activity level and dyspnea level since last year, but is working hard to improve. In his first month, James was already able to achieve his goal MET level with exercise. Pt enjoys talking with his fellow classmates and participating in education sessions. Cardiac monitor shows SR-ST with occasional PACs. Pt has had 2 short runs of SVT upon position change while on the monitor, pt denied feeling symptomatic during these occurrences. Maintains adequate saturation on 2-3L O2 via NC. Rates dyspnea as mild to moderate.           ____________________________________________________________________________  Dr. ROMMEL Velazquez    Date:   Time:

## 2024-11-25 ENCOUNTER — CLINICAL SUPPORT (OUTPATIENT)
Dept: CARDIAC REHAB | Facility: CLINIC | Age: 74
End: 2024-11-25
Payer: MEDICARE

## 2024-11-25 DIAGNOSIS — J44.9 CHRONIC OBSTRUCTIVE PULMONARY DISEASE, UNSPECIFIED COPD TYPE (MULTI): ICD-10-CM

## 2024-11-25 PROCEDURE — 94625 PHY/QHP OP PULM RHB W/O MNTR: CPT | Performed by: INTERNAL MEDICINE

## 2024-11-27 ENCOUNTER — LAB (OUTPATIENT)
Dept: LAB | Facility: LAB | Age: 74
End: 2024-11-27
Payer: MEDICARE

## 2024-11-27 ENCOUNTER — CLINICAL SUPPORT (OUTPATIENT)
Dept: CARDIAC REHAB | Facility: CLINIC | Age: 74
End: 2024-11-27
Payer: MEDICARE

## 2024-11-27 DIAGNOSIS — D64.9 ANEMIA, UNSPECIFIED TYPE: ICD-10-CM

## 2024-11-27 DIAGNOSIS — I25.10 CORONARY ARTERY CALCIFICATION: ICD-10-CM

## 2024-11-27 DIAGNOSIS — R73.9 HYPERGLYCEMIA: ICD-10-CM

## 2024-11-27 DIAGNOSIS — J44.9 CHRONIC OBSTRUCTIVE PULMONARY DISEASE, UNSPECIFIED COPD TYPE (MULTI): ICD-10-CM

## 2024-11-27 DIAGNOSIS — C61 PROSTATE CANCER (MULTI): ICD-10-CM

## 2024-11-27 DIAGNOSIS — I48.0 PAROXYSMAL ATRIAL FIBRILLATION (MULTI): ICD-10-CM

## 2024-11-27 LAB
ALBUMIN SERPL BCP-MCNC: 4.5 G/DL (ref 3.4–5)
ALP SERPL-CCNC: 65 U/L (ref 33–136)
ALT SERPL W P-5'-P-CCNC: 14 U/L (ref 10–52)
ANION GAP SERPL CALC-SCNC: 13 MMOL/L (ref 10–20)
AST SERPL W P-5'-P-CCNC: 15 U/L (ref 9–39)
BILIRUB SERPL-MCNC: 1.1 MG/DL (ref 0–1.2)
BUN SERPL-MCNC: 11 MG/DL (ref 6–23)
CALCIUM SERPL-MCNC: 9.4 MG/DL (ref 8.6–10.3)
CHLORIDE SERPL-SCNC: 103 MMOL/L (ref 98–107)
CHOLEST SERPL-MCNC: 112 MG/DL (ref 0–199)
CHOLESTEROL/HDL RATIO: 2.2
CO2 SERPL-SCNC: 32 MMOL/L (ref 21–32)
CREAT SERPL-MCNC: 0.82 MG/DL (ref 0.5–1.3)
EGFRCR SERPLBLD CKD-EPI 2021: >90 ML/MIN/1.73M*2
ERYTHROCYTE [DISTWIDTH] IN BLOOD BY AUTOMATED COUNT: 14.2 % (ref 11.5–14.5)
FOLATE SERPL-MCNC: 15.9 NG/ML
GLUCOSE SERPL-MCNC: 101 MG/DL (ref 74–99)
HCT VFR BLD AUTO: 38 % (ref 41–52)
HDLC SERPL-MCNC: 52 MG/DL
HGB BLD-MCNC: 11.7 G/DL (ref 13.5–17.5)
IRON SATN MFR SERPL: 13 % (ref 25–45)
IRON SERPL-MCNC: 38 UG/DL (ref 35–150)
LDLC SERPL CALC-MCNC: 43 MG/DL
MCH RBC QN AUTO: 27.7 PG (ref 26–34)
MCHC RBC AUTO-ENTMCNC: 30.8 G/DL (ref 32–36)
MCV RBC AUTO: 90 FL (ref 80–100)
NON HDL CHOLESTEROL: 60 MG/DL (ref 0–149)
NRBC BLD-RTO: 0 /100 WBCS (ref 0–0)
PLATELET # BLD AUTO: 202 X10*3/UL (ref 150–450)
POTASSIUM SERPL-SCNC: 4.4 MMOL/L (ref 3.5–5.3)
PROT SERPL-MCNC: 6.5 G/DL (ref 6.4–8.2)
PSA SERPL-MCNC: 3.5 NG/ML
RBC # BLD AUTO: 4.23 X10*6/UL (ref 4.5–5.9)
SODIUM SERPL-SCNC: 144 MMOL/L (ref 136–145)
TIBC SERPL-MCNC: 303 UG/DL (ref 240–445)
TRIGL SERPL-MCNC: 83 MG/DL (ref 0–149)
TSH SERPL-ACNC: 1.89 MIU/L (ref 0.44–3.98)
UIBC SERPL-MCNC: 265 UG/DL (ref 110–370)
VIT B12 SERPL-MCNC: 447 PG/ML (ref 211–911)
VLDL: 17 MG/DL (ref 0–40)
WBC # BLD AUTO: 8.8 X10*3/UL (ref 4.4–11.3)

## 2024-11-27 PROCEDURE — 83540 ASSAY OF IRON: CPT

## 2024-11-27 PROCEDURE — 84153 ASSAY OF PSA TOTAL: CPT

## 2024-11-27 PROCEDURE — 94625 PHY/QHP OP PULM RHB W/O MNTR: CPT | Performed by: INTERNAL MEDICINE

## 2024-11-27 PROCEDURE — 85027 COMPLETE CBC AUTOMATED: CPT

## 2024-11-27 PROCEDURE — 80061 LIPID PANEL: CPT

## 2024-11-27 PROCEDURE — 80053 COMPREHEN METABOLIC PANEL: CPT

## 2024-11-27 PROCEDURE — 84443 ASSAY THYROID STIM HORMONE: CPT

## 2024-11-27 PROCEDURE — 83550 IRON BINDING TEST: CPT

## 2024-11-27 PROCEDURE — 83036 HEMOGLOBIN GLYCOSYLATED A1C: CPT

## 2024-11-27 PROCEDURE — 36415 COLL VENOUS BLD VENIPUNCTURE: CPT

## 2024-11-27 PROCEDURE — 82607 VITAMIN B-12: CPT

## 2024-11-27 PROCEDURE — 82746 ASSAY OF FOLIC ACID SERUM: CPT

## 2024-11-28 LAB
EST. AVERAGE GLUCOSE BLD GHB EST-MCNC: 134 MG/DL
HBA1C MFR BLD: 6.3 %

## 2024-12-02 ENCOUNTER — CLINICAL SUPPORT (OUTPATIENT)
Dept: CARDIAC REHAB | Facility: CLINIC | Age: 74
End: 2024-12-02
Payer: MEDICARE

## 2024-12-02 DIAGNOSIS — J44.9 CHRONIC OBSTRUCTIVE PULMONARY DISEASE, UNSPECIFIED COPD TYPE (MULTI): ICD-10-CM

## 2024-12-02 PROCEDURE — 94625 PHY/QHP OP PULM RHB W/O MNTR: CPT | Performed by: INTERNAL MEDICINE

## 2024-12-04 ENCOUNTER — CLINICAL SUPPORT (OUTPATIENT)
Dept: CARDIAC REHAB | Facility: CLINIC | Age: 74
End: 2024-12-04
Payer: MEDICARE

## 2024-12-04 DIAGNOSIS — J44.9 CHRONIC OBSTRUCTIVE PULMONARY DISEASE, UNSPECIFIED COPD TYPE (MULTI): ICD-10-CM

## 2024-12-04 PROCEDURE — 94625 PHY/QHP OP PULM RHB W/O MNTR: CPT | Performed by: INTERNAL MEDICINE

## 2024-12-06 ENCOUNTER — CLINICAL SUPPORT (OUTPATIENT)
Dept: CARDIAC REHAB | Facility: CLINIC | Age: 74
End: 2024-12-06
Payer: MEDICARE

## 2024-12-06 DIAGNOSIS — J44.9 CHRONIC OBSTRUCTIVE PULMONARY DISEASE, UNSPECIFIED COPD TYPE (MULTI): ICD-10-CM

## 2024-12-06 PROCEDURE — 94625 PHY/QHP OP PULM RHB W/O MNTR: CPT | Performed by: INTERNAL MEDICINE

## 2024-12-09 ENCOUNTER — CLINICAL SUPPORT (OUTPATIENT)
Dept: CARDIAC REHAB | Facility: CLINIC | Age: 74
End: 2024-12-09
Payer: MEDICARE

## 2024-12-09 DIAGNOSIS — J44.9 CHRONIC OBSTRUCTIVE PULMONARY DISEASE, UNSPECIFIED COPD TYPE (MULTI): ICD-10-CM

## 2024-12-09 PROCEDURE — 94625 PHY/QHP OP PULM RHB W/O MNTR: CPT | Performed by: INTERNAL MEDICINE

## 2024-12-11 ENCOUNTER — CLINICAL SUPPORT (OUTPATIENT)
Dept: CARDIAC REHAB | Facility: CLINIC | Age: 74
End: 2024-12-11
Payer: MEDICARE

## 2024-12-11 DIAGNOSIS — J44.9 CHRONIC OBSTRUCTIVE PULMONARY DISEASE, UNSPECIFIED COPD TYPE (MULTI): ICD-10-CM

## 2024-12-11 PROCEDURE — 94625 PHY/QHP OP PULM RHB W/O MNTR: CPT

## 2024-12-13 ENCOUNTER — CLINICAL SUPPORT (OUTPATIENT)
Dept: CARDIAC REHAB | Facility: CLINIC | Age: 74
End: 2024-12-13
Payer: MEDICARE

## 2024-12-13 DIAGNOSIS — J44.9 CHRONIC OBSTRUCTIVE PULMONARY DISEASE, UNSPECIFIED COPD TYPE (MULTI): ICD-10-CM

## 2024-12-13 PROCEDURE — 94625 PHY/QHP OP PULM RHB W/O MNTR: CPT | Performed by: INTERNAL MEDICINE

## 2024-12-16 ENCOUNTER — CLINICAL SUPPORT (OUTPATIENT)
Dept: CARDIAC REHAB | Facility: CLINIC | Age: 74
End: 2024-12-16
Payer: MEDICARE

## 2024-12-16 DIAGNOSIS — J44.9 CHRONIC OBSTRUCTIVE PULMONARY DISEASE, UNSPECIFIED COPD TYPE (MULTI): ICD-10-CM

## 2024-12-16 PROCEDURE — 94625 PHY/QHP OP PULM RHB W/O MNTR: CPT | Performed by: INTERNAL MEDICINE

## 2024-12-18 ENCOUNTER — CLINICAL SUPPORT (OUTPATIENT)
Dept: CARDIAC REHAB | Facility: CLINIC | Age: 74
End: 2024-12-18
Payer: MEDICARE

## 2024-12-18 DIAGNOSIS — J44.9 CHRONIC OBSTRUCTIVE PULMONARY DISEASE, UNSPECIFIED COPD TYPE (MULTI): ICD-10-CM

## 2024-12-18 PROCEDURE — 94625 PHY/QHP OP PULM RHB W/O MNTR: CPT | Performed by: INTERNAL MEDICINE

## 2024-12-18 NOTE — PROGRESS NOTES
"INDIVIDUAL PULMONARY TREATMENT PLAN-60 DAY REASSESSMENT       Name: Raheel Cloud    Today's Date: 24   : 1950    Primary Provider: KEEGAN Rock  MRN: [id number]    Referring Physician: ROMMEL Velazquez     Diagnosis: COPD    Onset Date:        OXYGEN ASSESSMENT  SPO2 Rest: 99% SPO2 Exertion: 92%  Using supplemental O2: Yes  Liters rest: 2-2.5 L Liters exertion: 2-3 L  Demonstrates appropriate knowledge of O2 use: Yes  Demonstrates appropriate knowledge of O2 safety: Yes  Home O2 system: Concentrator  Portable O2: POC, backpack style  Home pulse oximeter: Yes, on oxygen, usually is 96-98%    Initial MMRC score: 3  Discharge MMRC score:     OXYGEN PLAN   Oxygen Goals:  1. Decrease MMRC score at discharge. Will reassess at discharge.   2. Decrease overall dyspnea during exercise using management techniques by discharge. Will reassess at discharge.     Oxygen Intervention/Education:   *Titrate supplemental oxygen if needed to maintain SPO2 greater than 88%.  *Monitor SPO2 pre, during and post exercise.  *Education: Breathing Retraining      PSYCHOSOCIAL ASSESSMENT  Patient reported stress level: severe. Grief, health  Using stress management skills: Yes, watch TV, visit family, smoking (used to golf and travel)  HX of anxiety: No  HX of depression: No; Pt denies depression but is in active grief from loss of wife 5 months ago  Patient reported any new stress, depression and anxiety symptoms: No    Family/Support System: son, step-mother, 2 daughters, extended family  Seeing mental health provider: No  Psychosocial medications: Denies    Initial PHQ-9 score: 18 \"Moderately Severe Risk\"  [place] PHQ-9 score: pending  Discharge PHQ-9 score:   Was PHQ-9 sent to provider: No  Date sent:    Initial CAT score: 24  Discharge CAT score:     Stages of change: Precontemplation    PSYCHOSOCIAL PLAN  Psychosocial Goals:  1. Decrease PHQ9 category classification from\"Moderately Severe Risk\" to \"Moderate Risk\" while in the " program. Will reassess at discharge.   2. Improve emotional health and gain confidence while in the program. Pt demonstrates increasing improvement in emotional health, enjoys talking with staff and classmates.    Psychosocial Interventions/ Education:  *Provided one on one emotional support and will facilitate peer support within the context of other phase II patients while in the program.   *Education: Emotional Aspects of Lung Disease       OTHER CORE COMPONENTS/ RISK FACTORS ASSESSMENT  Medication compliance: good compliance  Inhaler use: Yes, PRN  Using pill box: No  Carries medication list: No  Comments:     Bronchial hygiene:  Cough: Moderately productive cough  Comments: clear, nickel size    Blood Pressure Management:  History of High BP: No; on Bystolic for high HR  Resting BP: 124/56    Tobacco: SMOKER  Form of tobacco: Cigarettes 1 PPD  Quit Date:   Anyone in the house smoke: Yes, self    Initial Knowledge Test Score: 15/15  Discharge Knowledge Test Score:    OTHER CORE COMPONENTS/ RISK FACTOR PLAN   Other Core components/Risk Factor Goals:  1. Set quit date for smoking cessation by discharge. Patient has not expressed desire to set quit date.  2. Gain knowledge of pulmonary disease and chronic disease management prior to discharge. Will reassess at discharge.     Other Components/ Risk Factors Intervention/Education:  *Will continue to monitor HR, BP, and SPO2 each session.  *Encourage review of educational materials.  *Education: Stroke Awareness/Hands-Only CPR, Online Resources/Visiting the Doctor/Advanced Directives, A&P/Lung Diseases/PFTs, Pharmacist Lecture, Temp. Extremes/Hydration/Environment/Energy Conservation    NUTRITION ASSESSMENT  Diabetes: No  HgbA1c: 5.7%  Date Checked:  Monitors glucose at home: No  Fasting Blood Sugar Range:  Frequency:  Hypoglycemic Episode: Denies    Weight Management  Weight:  143 lbs  Height: 69 inches   BMI:  21.6  Current Diet: regular  Barriers to dietary change:  "\"Difficult to meal plan for one person, lots of food I don't like\"    Initial Dietary Assessment Score: 55  Discharge Dietary Assessment Score:     NUTRITION PLAN  Nutrition Goals:   1. Improve Picture Your Plate assessment results by discharge. Will reassess at discharge.   2. Make lifestyle changes to diet to include healthy pulmonary options while in the program. Encouraged pt to decrease intake of sodium and red meat.     Nutrition Intervention/Education:   *Sent dietician Picture Your Plate assessment for scoring and recommendations.   *Encourage attendance to dietician lectures.   *Education: Dietician Lecture, Nutrition & Lung Disease, Healthy Eating for Pulmonary Patients       EXERCISE ASSESSMENT  Home Exercise?: No  Frequency:   Mode:  *Pt has been unable to exercise since wife passed in 5/24 with recurrent hospitalizations for A. Fib with RVR and just now stable enough to resume exercise--wanted to wait and start with phase 2 so he could be monitored.  Still nervous about going into A. Fib again with exertion.    Initial 6 Minute Walk Test Assessment:  Distance (meters): 227 meters  FiO2: 3 L NC SPO2: 98%    [place] 6 Minute Walk Test Assessment:  Distance (meters): 354 meters  FiO2: 2 L NC SPO2: 92%      EXERCISE PLAN  Exercise Goals:   1. Goal of 4.5 METs by discharge. Goal met with peak of 4.4 METs.  2. Six minute walk test increased by 30 meters or greater at discharge. Walked 127m more at midway, will reassess again at discharge.  3. Have a plan in place for continued exercise after the program by discharge. Will reassess at discharge.     Exercise Prescription:   Based on 6 Minute Walk Test on 10/24/24  Frequency: 3 days per week  Duration: 30 minutes  Intensity RPE: 11-14  Target HR: 101-133  MET Level Range: 2.7-4.4    Prescribed oxygen flow rate (l/m): 3L NC  Prescribed SPO2 parameters: >88%       Modality METS Load  Duration   1 Warm Up    05:00   2 NuStep 2.7 52 valenzuela Level 3 07:30   3 Upright " Bike 4.4 52 valenzuela Level 1 07:30   4 Recumbent Bike 2.4 10 valenzuela  07:30   5 Bands    07:30   7 Cool Down     05:00     Exercise Intervention/Education:   *Aim to progress 1 MET every 6-8 weeks or as tolerated.  *Incorporate resistance training for muscular endurance and strength.  *Education: Resistance Training/Flexibility/Balance    Date of first exercise session: 10/28/2024    LEARNING ASSESSMENT & BARRIERS  Readiness to Learn: Eager to learn  Barriers: reading glasses  Comments:    FALL RISK  moderate  Comments:  Pt walked with steady gait on track, pulling oxygen tank behind him.    INDIVIDUAL PATIENT GOALS   Improve fitness to increase walking distance by discharge.  2.    Add resistance training to increase strength while in the program.    MEDICATIONS  Albuterol sulfate 90mcg 2 puffs PRN; Ammonium lactate 12% PRN; Atorvastatin 40mg daily; Pulmicort 1mg nebulization BID; Budesonide/glycopyr/formoterol 160-9-4.8mcg 2 puffs BID; Fluticasone 50mcg 2 sprays/nostril BID; Duoneb 0.5-2.5mg/3mL QID, 20-100mcg/actuation mist 1 puff PRN; Nebivolol 2.5mg BID; Zofran 8mg PRN; Oxygen 3L/24hrs; Rivaroxaban 15mg daily in evening; Triamcinolone acetonide 0.1% BID PRN    STAFF COMMENTS:   James has completed 21/36 sessions thus far in pulmonary rehab. Pt is progressing well with his exercise, and has great motivation for getting stronger. Tolerates increasing workloads well, especially with resistance training. Cardiac monitor shows SR-ST with occasional PACs. Pt has not had any further episodes of SVT. Maintains adequate saturation on 2-3L O2 via NC. Staff has encouraged pt to decrease to 1L of supplemental O2 as tolerated, pt occasionally lowers to 1L. Rates dyspnea as mild to moderate.           ____________________________________________________________________________  Dr. ROMMEL Velazquez    Date:   Time:

## 2024-12-19 DIAGNOSIS — I48.91 ATRIAL FIBRILLATION WITH RVR (MULTI): ICD-10-CM

## 2024-12-19 RX ORDER — NEBIVOLOL 2.5 MG/1
2.5 TABLET ORAL DAILY
Start: 2024-12-19 | End: 2025-12-19

## 2024-12-19 NOTE — PROGRESS NOTES
Was alerted by pulmonary rehab that the patient was complaining of palpitations and slow heart rates.  I called the patient on the telephone.    He tells me that he has been having intermittent low blood pressures as low as 80s over 40s at rehab.  He also tells me that he has been monitoring his heart rate with a pulse ox as well as a [employer] mobile device.  At times he will feel a pulse in the 40s and it is confirmed by [employer] mobile to be having some pauses or bradycardia.  He is also finding PACs and PVCs.    Along with this he was on Roflumilast and was having significant diarrhea and nausea.  He lost about 20 pounds.  He was not sure if it was the Roflumilast or his atorvastatin.  He stopped both and in 1 week the nausea has resolved and his diarrhea is significantly improved.  He is gained 5 pounds back.    Previously he was on nebivolol 2.5 mg twice a day but we decrease that to daily because of hypotension.  He has remained on this dose.    I have asked him to stop the nebivolol as it might be causing hypotension given his weight loss.  I also would like a 7-day monitor to further assess his underlying rhythm.  If there is evidence of sick sinus syndrome then I would have him see EP again.  He had seen Dr. Myles however Dr. Myles is retiring.  We would have to have him establish with a new electrophysiologist.    I will have my office reach out to him to schedule the monitor.    He says that he will try to get back on the atorvastatin every other day at some point and if there is no significant diarrhea then he will slowly go back up to daily.

## 2024-12-20 ENCOUNTER — CLINICAL SUPPORT (OUTPATIENT)
Dept: CARDIAC REHAB | Facility: CLINIC | Age: 74
End: 2024-12-20
Payer: MEDICARE

## 2024-12-20 DIAGNOSIS — J44.9 CHRONIC OBSTRUCTIVE PULMONARY DISEASE, UNSPECIFIED COPD TYPE (MULTI): ICD-10-CM

## 2024-12-20 PROCEDURE — 94625 PHY/QHP OP PULM RHB W/O MNTR: CPT | Performed by: INTERNAL MEDICINE

## 2024-12-23 ENCOUNTER — CLINICAL SUPPORT (OUTPATIENT)
Dept: CARDIAC REHAB | Facility: CLINIC | Age: 74
End: 2024-12-23
Payer: MEDICARE

## 2024-12-23 DIAGNOSIS — J44.9 CHRONIC OBSTRUCTIVE PULMONARY DISEASE, UNSPECIFIED COPD TYPE (MULTI): ICD-10-CM

## 2024-12-23 PROCEDURE — 94625 PHY/QHP OP PULM RHB W/O MNTR: CPT | Performed by: INTERNAL MEDICINE

## 2024-12-24 ENCOUNTER — APPOINTMENT (OUTPATIENT)
Dept: CARDIOLOGY | Facility: HOSPITAL | Age: 74
End: 2024-12-24
Payer: MEDICARE

## 2024-12-27 ENCOUNTER — CLINICAL SUPPORT (OUTPATIENT)
Dept: CARDIAC REHAB | Facility: CLINIC | Age: 74
End: 2024-12-27
Payer: MEDICARE

## 2024-12-27 DIAGNOSIS — J44.9 CHRONIC OBSTRUCTIVE PULMONARY DISEASE, UNSPECIFIED COPD TYPE (MULTI): ICD-10-CM

## 2024-12-27 PROCEDURE — 94625 PHY/QHP OP PULM RHB W/O MNTR: CPT | Performed by: INTERNAL MEDICINE

## 2024-12-30 ENCOUNTER — CLINICAL SUPPORT (OUTPATIENT)
Dept: CARDIAC REHAB | Facility: CLINIC | Age: 74
End: 2024-12-30
Payer: MEDICARE

## 2024-12-30 DIAGNOSIS — J44.9 CHRONIC OBSTRUCTIVE PULMONARY DISEASE, UNSPECIFIED COPD TYPE (MULTI): ICD-10-CM

## 2024-12-30 PROCEDURE — 94625 PHY/QHP OP PULM RHB W/O MNTR: CPT | Performed by: INTERNAL MEDICINE

## 2025-01-02 ENCOUNTER — HOSPITAL ENCOUNTER (OUTPATIENT)
Dept: CARDIOLOGY | Facility: HOSPITAL | Age: 75
Discharge: HOME | End: 2025-01-02
Payer: MEDICARE

## 2025-01-02 DIAGNOSIS — I48.91 ATRIAL FIBRILLATION WITH RVR (MULTI): ICD-10-CM

## 2025-01-02 PROCEDURE — 93242 EXT ECG>48HR<7D RECORDING: CPT

## 2025-01-03 ENCOUNTER — APPOINTMENT (OUTPATIENT)
Dept: CARDIAC REHAB | Facility: CLINIC | Age: 75
End: 2025-01-03
Payer: MEDICARE

## 2025-01-06 ENCOUNTER — CLINICAL SUPPORT (OUTPATIENT)
Dept: CARDIAC REHAB | Facility: CLINIC | Age: 75
End: 2025-01-06
Payer: MEDICARE

## 2025-01-06 DIAGNOSIS — J44.9 CHRONIC OBSTRUCTIVE PULMONARY DISEASE, UNSPECIFIED COPD TYPE (MULTI): ICD-10-CM

## 2025-01-06 PROCEDURE — 94625 PHY/QHP OP PULM RHB W/O MNTR: CPT | Performed by: INTERNAL MEDICINE

## 2025-01-08 ENCOUNTER — CLINICAL SUPPORT (OUTPATIENT)
Dept: CARDIAC REHAB | Facility: CLINIC | Age: 75
End: 2025-01-08
Payer: MEDICARE

## 2025-01-08 DIAGNOSIS — J44.9 CHRONIC OBSTRUCTIVE PULMONARY DISEASE, UNSPECIFIED COPD TYPE (MULTI): ICD-10-CM

## 2025-01-08 PROCEDURE — 94625 PHY/QHP OP PULM RHB W/O MNTR: CPT | Performed by: INTERNAL MEDICINE

## 2025-01-10 ENCOUNTER — CLINICAL SUPPORT (OUTPATIENT)
Dept: CARDIAC REHAB | Facility: CLINIC | Age: 75
End: 2025-01-10
Payer: MEDICARE

## 2025-01-10 DIAGNOSIS — J44.9 CHRONIC OBSTRUCTIVE PULMONARY DISEASE, UNSPECIFIED COPD TYPE (MULTI): ICD-10-CM

## 2025-01-10 PROCEDURE — 94625 PHY/QHP OP PULM RHB W/O MNTR: CPT | Performed by: INTERNAL MEDICINE

## 2025-01-13 ENCOUNTER — CLINICAL SUPPORT (OUTPATIENT)
Dept: CARDIAC REHAB | Facility: CLINIC | Age: 75
End: 2025-01-13
Payer: MEDICARE

## 2025-01-13 DIAGNOSIS — J44.9 CHRONIC OBSTRUCTIVE PULMONARY DISEASE, UNSPECIFIED COPD TYPE (MULTI): ICD-10-CM

## 2025-01-13 PROCEDURE — 94625 PHY/QHP OP PULM RHB W/O MNTR: CPT | Performed by: INTERNAL MEDICINE

## 2025-01-15 ENCOUNTER — CLINICAL SUPPORT (OUTPATIENT)
Dept: CARDIAC REHAB | Facility: CLINIC | Age: 75
End: 2025-01-15
Payer: MEDICARE

## 2025-01-15 DIAGNOSIS — J44.9 CHRONIC OBSTRUCTIVE PULMONARY DISEASE, UNSPECIFIED COPD TYPE (MULTI): ICD-10-CM

## 2025-01-15 PROCEDURE — 94625 PHY/QHP OP PULM RHB W/O MNTR: CPT | Performed by: INTERNAL MEDICINE

## 2025-01-17 ENCOUNTER — CLINICAL SUPPORT (OUTPATIENT)
Dept: CARDIAC REHAB | Facility: CLINIC | Age: 75
End: 2025-01-17
Payer: MEDICARE

## 2025-01-17 DIAGNOSIS — J44.9 CHRONIC OBSTRUCTIVE PULMONARY DISEASE, UNSPECIFIED COPD TYPE (MULTI): ICD-10-CM

## 2025-01-17 PROCEDURE — 94625 PHY/QHP OP PULM RHB W/O MNTR: CPT | Performed by: INTERNAL MEDICINE

## 2025-01-20 ENCOUNTER — APPOINTMENT (OUTPATIENT)
Dept: CARDIAC REHAB | Facility: CLINIC | Age: 75
End: 2025-01-20
Payer: MEDICARE

## 2025-01-22 ENCOUNTER — APPOINTMENT (OUTPATIENT)
Dept: CARDIAC REHAB | Facility: CLINIC | Age: 75
End: 2025-01-22
Payer: MEDICARE

## 2025-01-24 ENCOUNTER — APPOINTMENT (OUTPATIENT)
Dept: CARDIAC REHAB | Facility: CLINIC | Age: 75
End: 2025-01-24
Payer: MEDICARE

## 2025-01-27 ENCOUNTER — APPOINTMENT (OUTPATIENT)
Dept: CARDIAC REHAB | Facility: CLINIC | Age: 75
End: 2025-01-27
Payer: MEDICARE

## 2025-01-27 DIAGNOSIS — J44.9 CHRONIC OBSTRUCTIVE PULMONARY DISEASE, UNSPECIFIED COPD TYPE (MULTI): ICD-10-CM

## 2025-01-27 PROCEDURE — 94625 PHY/QHP OP PULM RHB W/O MNTR: CPT | Performed by: INTERNAL MEDICINE

## 2025-01-29 ENCOUNTER — APPOINTMENT (OUTPATIENT)
Dept: CARDIAC REHAB | Facility: CLINIC | Age: 75
End: 2025-01-29
Payer: MEDICARE

## 2025-01-29 DIAGNOSIS — J44.9 CHRONIC OBSTRUCTIVE PULMONARY DISEASE, UNSPECIFIED COPD TYPE (MULTI): ICD-10-CM

## 2025-01-29 PROCEDURE — 94625 PHY/QHP OP PULM RHB W/O MNTR: CPT | Performed by: INTERNAL MEDICINE

## 2025-01-31 ENCOUNTER — APPOINTMENT (OUTPATIENT)
Dept: CARDIAC REHAB | Facility: CLINIC | Age: 75
End: 2025-01-31
Payer: MEDICARE

## 2025-01-31 DIAGNOSIS — J44.9 CHRONIC OBSTRUCTIVE PULMONARY DISEASE, UNSPECIFIED COPD TYPE (MULTI): ICD-10-CM

## 2025-01-31 PROCEDURE — 94625 PHY/QHP OP PULM RHB W/O MNTR: CPT | Performed by: INTERNAL MEDICINE

## 2025-02-03 ENCOUNTER — APPOINTMENT (OUTPATIENT)
Dept: CARDIAC REHAB | Facility: CLINIC | Age: 75
End: 2025-02-03
Payer: MEDICARE

## 2025-02-03 DIAGNOSIS — J44.9 CHRONIC OBSTRUCTIVE PULMONARY DISEASE, UNSPECIFIED COPD TYPE (MULTI): ICD-10-CM

## 2025-02-03 PROCEDURE — 94625 PHY/QHP OP PULM RHB W/O MNTR: CPT | Performed by: INTERNAL MEDICINE

## 2025-02-03 NOTE — PROGRESS NOTES
"INDIVIDUAL PULMONARY TREATMENT PLAN-90 DAY REASSESSMENT        Name: Raheel Cloud    Today's Date: 25   : 1950    Primary Provider: KEEGAN Rock  MRN: [id number]    Referring Physician: ROMMEL Velazquez     Diagnosis: COPD    Onset Date:        OXYGEN DISCHARGE/ FOLLOW-UP   SPO2 Rest: 97% SPO2 Exertion: 84% (during 6MWT)  Using supplemental O2: Yes  Liters rest: 2-2.5 L Liters exertion: 2-3 L  Demonstrates appropriate knowledge of O2 use: Yes  Demonstrates appropriate knowledge of O2 safety: Yes  Home O2 system: Concentrator  Portable O2: POC, backpack style  Home pulse oximeter: Yes, on oxygen, usually is 96-98%    Initial MMRC score: 3  Discharge MMRC score: 3    OXYGEN PLAN   Oxygen Goals:  1. Decrease MMRC score at discharge. Goal not met, patient maintains same overall level of dyspnea.  2. Decrease overall dyspnea during exercise using management techniques by discharge. Goal partially met, patient reports same dyspnea levels but manages them much better than at the start of the program.    Oxygen Intervention/Education:   *Education: Breathing Retraining, Using Oxygen/Sleep & Sleep Apnea      PSYCHOSOCIAL DISCHARGE/ FOLLOW-UP   Patient reported stress level: severe. Grief, health  Using stress management skills: Yes, watch TV, visit family, smoking (used to golf and travel)  HX of anxiety: No  HX of depression: No; Pt denies depression but is in active grief from loss of wife   Patient reported any new stress, depression and anxiety symptoms: No, patient has noticeably improved his mental health since starting the program    Family/Support System: son, step-mother, 2 daughters, extended family  Seeing mental health provider: No  Psychosocial medications: Denies    Initial PHQ-9 score: 18 \"Moderately Severe Risk\"  [place] PHQ-9 score: 21 \"Severe\"  Discharge PHQ-9 score: 7 \"Mild\"  Was PHQ-9 sent to provider: No, pt's care team aware of his grief due to the loss of his wife  Date sent:    Initial CAT " "score: 24  Discharge CAT score: 27    Stages of change: Contemplation    PSYCHOSOCIAL PLAN  Psychosocial Goals:  1. Decrease PHQ9 category classification from\"Moderately Severe Risk\" to \"Moderate Risk\" while in the program. Goal met, patient has much improved his mental health since starting the program.  2. Improve emotional health and gain confidence while in the program. Goal met.    Psychosocial Interventions/ Education:  *Education: Emotional Aspects of Lung Disease, Stress Series, Anxiety & Mindfulness       OTHER CORE COMPONENTS/ RISK FACTORS DISCHARGE/ FOLLOW-UP   Medication compliance: good compliance  Inhaler use: Yes, PRN  Using pill box: No  Carries medication list: No  Comments:     Bronchial hygiene:  Cough: Moderately productive cough  Comments: clear, nickel size    Blood Pressure Management:  History of High BP: No; on Bystolic for high HR  Resting BP: 122/54    Tobacco: SMOKER  Form of tobacco: Cigarettes 1 PPD  Quit Date:   Anyone in the house smoke: Yes, self    Initial Knowledge Test Score: 15/15  Discharge Knowledge Test Score: 15/15    OTHER CORE COMPONENTS/ RISK FACTOR PLAN   Other Core components/Risk Factor Goals:  1. Set quit date for smoking cessation by discharge. Goal not met, patient does not express desire to quit at this time.  2. Gain knowledge of pulmonary disease and chronic disease management prior to discharge. Goal met.    Other Components/ Risk Factors Intervention/Education:  *Education: Stroke Awareness/Hands-Only CPR, Online Resources/Visiting the Doctor/Advanced Directives, A&P/Lung Diseases/PFTs, Pharmacist Lecture, Temp. Extremes/Hydration/Environment/Energy Conservation, Medication Overview/Inhaled Medications, Signs & Symptoms of Infection    NUTRITION DISCHARGE/ FOLLOW-UP   Diabetes: No  HgbA1c: 5.7%  Date Checked:  Monitors glucose at home: No  Fasting Blood Sugar Range:  Frequency:  Hypoglycemic Episode: Denies    Weight Management  Weight:  152 lbs  Height: 69 " "inches   BMI:  22.6  Current Diet: regular  Barriers to dietary change: \"Difficult to meal plan for one person, lots of food I don't like\"    Initial Dietary Assessment Score: 55  Discharge Dietary Assessment Score: 60    NUTRITION PLAN  Nutrition Goals:   1. Improve Picture Your Plate assessment results by discharge. Goal met.  2. Make lifestyle changes to diet to include healthy pulmonary options while in the program. Goal partially met, patient still needs to work on reducing red meat intake.    Nutrition Intervention/Education:   *Education: Dietician Lecture, Nutrition & Lung Disease, Healthy Eating for Pulmonary Patients       EXERCISE DISCHARGE/ FOLLOW-UP   Home Exercise?: No  Frequency:   Mode:      Initial 6 Minute Walk Test Assessment:  Distance (meters): 227 meters  FiO2: 3 L NC SPO2: 98%    [place] 6 Minute Walk Test Assessment:  Distance (meters): 354 meters  FiO2: 2 L NC SPO2: 92%    Discharge 6 Minute Walk Test Assessment:  Distance (meters): 372 meters  FiO2: 2 L NC SPO2: 84%      EXERCISE PLAN  Exercise Goals:   1. Goal of 4.5 METs by discharge. Initial goal of 3.7 METs met, secondary goal of 4.5 METs not achieved.  2. Six minute walk test increased by 30 meters or greater at discharge. Goal met.  3. Have a plan in place for continued exercise after the program by discharge. Goal partially met, patient plans to exercise at home or at a local gym.    Exercise Prescription:   Based on 6 Minute Walk Test on 10/24/24  Frequency: 3 days per week  Duration: 30 minutes  Intensity RPE: 11-14  Target HR: 101-133  MET Level Range: 2.7-4.4    Prescribed oxygen flow rate (l/m): 3L NC  Prescribed SPO2 parameters: >88%       Modality METS Load  Duration   1 Warm Up    05:00   2 NuStep 2.7 52 valeznuela Level 3 07:30   3 Upright Bike 4.4 52 valenzuela Level 1 07:30   4 Recumbent Bike 2.4 10 valenzuela  07:30   5 Bands    07:30   7 Cool Down     05:00     Exercise Intervention/Education:   *Education: Resistance " Training/Flexibility/Balance, Home Exercise/Benefits of Exercise/Enjoying Exercise    Date of first exercise session: 10/28/2024    LEARNING ASSESSMENT & BARRIERS  Readiness to Learn: Eager to learn  Barriers: reading glasses  Comments:    FALL RISK  moderate  Comments:  Pt walked with steady gait on track, pulling oxygen tank behind him.    INDIVIDUAL PATIENT GOALS   Improve fitness to increase walking distance by discharge. Goal met.  2.    Add resistance training to increase strength while in the program. Goal met.    MEDICATIONS  Albuterol sulfate 90mcg 2 puffs PRN; Ammonium lactate 12% PRN; Atorvastatin 40mg daily; Pulmicort 1mg nebulization BID; Budesonide/glycopyr/formoterol 160-9-4.8mcg 2 puffs BID; Fluticasone 50mcg 2 sprays/nostril BID; Duoneb 0.5-2.5mg/3mL QID, 20-100mcg/actuation mist 1 puff PRN; Nebivolol 2.5mg BID; Zofran 8mg PRN; Oxygen 3L/24hrs; Rivaroxaban 15mg daily in evening; Triamcinolone acetonide 0.1% BID PRN    STAFF COMMENTS:   James has completed 36/36 sessions and graduated from pulmonary rehab. James was able to achieve many of his goals, including greatly improving his MET level and better managing his dyspnea. Staff observed and James himself noted how significantly his mental health has improved while in the program, which he accredits to getting out of the house and being around other people. James plans to continue exercising either at home or at a local gym, and I encouraged him to seek out social opportunities while exercising since it has helped him so much. Cardiac monitor shows SR-ST with frequent PACs, no atrial bigeminy since last update. Maintained adequate saturation on 2-3L O2 via NC, though did desaturate on final 6MWT. Rated dyspnea as mild to moderate.           ____________________________________________________________________________  Dr. ROMMEL Velazquez    Date:   Time:

## 2025-02-07 DIAGNOSIS — R22.41 LOCALIZED SWELLING OF RIGHT LOWER EXTREMITY: Primary | ICD-10-CM

## 2025-02-07 NOTE — PROGRESS NOTES
Went over the patient's ambulatory monitor result.    Tells me that he was having bilateral lower extremity swelling however the left leg seems to be better.  Thought this was from dietary sodium indiscretion.  Still has some swelling of the right lower extremity.    Will be seeing his dermatologist soon.  Told him if the swelling does not improve that he should get a venous duplex which is ordered.  He can try using some the Lasix that he still has at home in the meantime.

## 2025-03-05 ENCOUNTER — HOSPITAL ENCOUNTER (OUTPATIENT)
Dept: CARDIOLOGY | Facility: HOSPITAL | Age: 75
Discharge: HOME | End: 2025-03-05
Payer: MEDICARE

## 2025-03-05 DIAGNOSIS — R22.41 LOCALIZED SWELLING OF RIGHT LOWER EXTREMITY: ICD-10-CM

## 2025-03-05 DIAGNOSIS — R60.0 LOCALIZED EDEMA: ICD-10-CM

## 2025-03-05 PROCEDURE — 93970 EXTREMITY STUDY: CPT

## 2025-03-05 PROCEDURE — 93970 EXTREMITY STUDY: CPT | Performed by: INTERNAL MEDICINE

## 2025-04-10 ENCOUNTER — APPOINTMENT (OUTPATIENT)
Dept: PRIMARY CARE | Facility: CLINIC | Age: 75
End: 2025-04-10
Payer: MEDICARE

## 2025-04-15 ENCOUNTER — APPOINTMENT (OUTPATIENT)
Dept: CARDIOLOGY | Facility: CLINIC | Age: 75
End: 2025-04-15
Payer: MEDICARE

## 2025-05-20 ENCOUNTER — APPOINTMENT (OUTPATIENT)
Dept: PRIMARY CARE | Facility: CLINIC | Age: 75
End: 2025-05-20
Payer: MEDICARE

## 2025-05-20 VITALS
OXYGEN SATURATION: 96 % | DIASTOLIC BLOOD PRESSURE: 80 MMHG | WEIGHT: 150 LBS | HEIGHT: 69 IN | SYSTOLIC BLOOD PRESSURE: 124 MMHG | RESPIRATION RATE: 14 BRPM | TEMPERATURE: 97.2 F | BODY MASS INDEX: 22.22 KG/M2 | HEART RATE: 68 BPM

## 2025-05-20 DIAGNOSIS — R73.9 ELEVATED BLOOD SUGAR: ICD-10-CM

## 2025-05-20 DIAGNOSIS — Z00.00 ROUTINE GENERAL MEDICAL EXAMINATION AT HEALTH CARE FACILITY: Primary | ICD-10-CM

## 2025-05-20 DIAGNOSIS — J41.8 MIXED SIMPLE AND MUCOPURULENT CHRONIC BRONCHITIS (MULTI): ICD-10-CM

## 2025-05-20 DIAGNOSIS — J96.11 CHRONIC RESPIRATORY FAILURE WITH HYPOXIA: ICD-10-CM

## 2025-05-20 DIAGNOSIS — C61 MALIGNANT NEOPLASM OF PROSTATE (MULTI): ICD-10-CM

## 2025-05-20 DIAGNOSIS — I48.91 ATRIAL FIBRILLATION WITH RVR (MULTI): ICD-10-CM

## 2025-05-20 PROCEDURE — 1170F FXNL STATUS ASSESSED: CPT | Performed by: INTERNAL MEDICINE

## 2025-05-20 PROCEDURE — 99213 OFFICE O/P EST LOW 20 MIN: CPT | Performed by: INTERNAL MEDICINE

## 2025-05-20 PROCEDURE — G0439 PPPS, SUBSEQ VISIT: HCPCS | Performed by: INTERNAL MEDICINE

## 2025-05-20 PROCEDURE — 1159F MED LIST DOCD IN RCRD: CPT | Performed by: INTERNAL MEDICINE

## 2025-05-20 PROCEDURE — 1123F ACP DISCUSS/DSCN MKR DOCD: CPT | Performed by: INTERNAL MEDICINE

## 2025-05-20 PROCEDURE — 1160F RVW MEDS BY RX/DR IN RCRD: CPT | Performed by: INTERNAL MEDICINE

## 2025-05-20 PROCEDURE — 3008F BODY MASS INDEX DOCD: CPT | Performed by: INTERNAL MEDICINE

## 2025-05-20 PROCEDURE — 1158F ADVNC CARE PLAN TLK DOCD: CPT | Performed by: INTERNAL MEDICINE

## 2025-05-20 RX ORDER — MONTELUKAST SODIUM 10 MG/1
10 TABLET ORAL NIGHTLY
Qty: 30 TABLET | Refills: 5 | Status: SHIPPED | OUTPATIENT
Start: 2025-05-20 | End: 2025-11-16

## 2025-05-20 RX ORDER — TAMSULOSIN HYDROCHLORIDE 0.4 MG/1
2 CAPSULE ORAL
COMMUNITY
Start: 2024-10-09

## 2025-05-20 ASSESSMENT — ACTIVITIES OF DAILY LIVING (ADL)
GROCERY_SHOPPING: INDEPENDENT
TAKING_MEDICATION: INDEPENDENT
DOING_HOUSEWORK: INDEPENDENT
BATHING: INDEPENDENT
DRESSING: INDEPENDENT
MANAGING_FINANCES: INDEPENDENT

## 2025-05-20 ASSESSMENT — ENCOUNTER SYMPTOMS
ABDOMINAL PAIN: 0
COUGH: 0
PALPITATIONS: 0
SHORTNESS OF BREATH: 1
CONSTIPATION: 0
DIARRHEA: 0
WHEEZING: 0

## 2025-05-20 ASSESSMENT — PATIENT HEALTH QUESTIONNAIRE - PHQ9
1. LITTLE INTEREST OR PLEASURE IN DOING THINGS: NOT AT ALL
2. FEELING DOWN, DEPRESSED OR HOPELESS: NOT AT ALL
SUM OF ALL RESPONSES TO PHQ9 QUESTIONS 1 AND 2: 0

## 2025-05-20 NOTE — PROGRESS NOTES
"Subjective   Reason for Visit: Raheel Cloud is an 74 y.o. male here for a Medicare Wellness visit.     Reviewed all medications by prescribing practitioner or clinical pharmacist (such as prescriptions, OTCs, herbal therapies and supplements) and documented in the medical record.    Overall he has been doing fairly well.  He did have a COPD flare after a family gathering which he feels was infective in etiology.  Then he had another one shortly after coming back from [facility].  This one he felt was more allergic - no fever or illness associated symptoms.    Currently back to his baseline.    Patient Care Team:  Silvestre Rokc DO as PCP - General  Silvest[facility] Rock DO [facility]  Sedrick Myles MD as Consulting Physician (Cardiology)     Review of Systems    Objective   Vitals:  /80 (BP Location: Left arm, Patient Position: Sitting, BP Cuff Size: Adult)   Pulse 68   Temp 36.2 °C (97.2 °F) (Tympanic)   Resp 14   Ht 1.753 m (5' 9\")   Wt 68 kg (150 lb)   SpO2 96% Comment: o2 2 lpm  BMI 22.15 kg/m²       Physical Exam  Vitals reviewed.   Constitutional:       Appearance: Normal appearance.   HENT:      Head: Normocephalic.   Cardiovascular:      Rate and Rhythm: Normal rate.   Pulmonary:      Effort: Pulmonary effort is normal.   Musculoskeletal:         General: Normal range of motion.   Neurological:      General: No focal deficit present.      Mental Status: He is alert.   Psychiatric:         Mood and Affect: Mood normal.         Assessment & Plan  Routine general medical examination at health care facility         Atrial fibrillation with RVR (Multi)    Orders:    Thyroid Stimulating Hormone; Future    Mixed simple and mucopurulent chronic bronchitis (Multi)         Chronic respiratory failure with hypoxia    Orders:    montelukast (Singulair) 10 mg tablet; Take 1 tablet (10 mg) by mouth once daily at bedtime.    Elevated blood sugar    Orders:    " Comprehensive Metabolic Panel; Future    Hemoglobin A1C; Future    Malignant neoplasm of prostate (Multi)         COPD advanced in nature.    Recent allergic trigger.  We discussed adding Singulair to help reduce allergic inflammation etc.    A fib has been stable.    We discussed his blood sugars and additional blood test results.    Annual Wellness Visit questions and answers were reviewed and discussed including the importance of discussing end of life wishes as well as having a living will and health care power of .     Follow up in 6 months - sooner if any issues.

## 2025-05-20 NOTE — PROGRESS NOTES
"Subjective   Patient ID: Raheel Cloud is a 74 y.o. male who presents for Medicare Annual Wellness Visit Subsequent.    HPI     Review of Systems   Respiratory:  Positive for shortness of breath. Negative for cough and wheezing.         Chronic   Cardiovascular:  Negative for chest pain and palpitations.   Gastrointestinal:  Negative for abdominal pain, constipation and diarrhea.       Objective   /80 (BP Location: Left arm, Patient Position: Sitting, BP Cuff Size: Adult)   Pulse 68   Temp 36.2 °C (97.2 °F) (Tympanic)   Resp 14   Ht 1.753 m (5' 9\")   Wt 68 kg (150 lb)   SpO2 96% Comment: o2 2 lpm  BMI 22.15 kg/m²     Physical Exam    Assessment/Plan          "

## 2025-05-22 ENCOUNTER — APPOINTMENT (OUTPATIENT)
Dept: PRIMARY CARE | Facility: CLINIC | Age: 75
End: 2025-05-22
Payer: MEDICARE

## 2025-08-01 ENCOUNTER — OFFICE VISIT (OUTPATIENT)
Dept: CARDIOLOGY | Facility: CLINIC | Age: 75
End: 2025-08-01
Payer: MEDICARE

## 2025-08-01 VITALS
BODY MASS INDEX: 22.66 KG/M2 | HEIGHT: 69 IN | HEART RATE: 70 BPM | DIASTOLIC BLOOD PRESSURE: 50 MMHG | SYSTOLIC BLOOD PRESSURE: 110 MMHG | WEIGHT: 153 LBS | OXYGEN SATURATION: 97 %

## 2025-08-01 DIAGNOSIS — I48.91 ATRIAL FIBRILLATION WITH RVR (MULTI): ICD-10-CM

## 2025-08-01 DIAGNOSIS — I48.0 PAROXYSMAL ATRIAL FIBRILLATION (MULTI): ICD-10-CM

## 2025-08-01 DIAGNOSIS — I25.10 CORONARY ARTERY CALCIFICATION: Primary | ICD-10-CM

## 2025-08-01 DIAGNOSIS — E78.5 DYSLIPIDEMIA: ICD-10-CM

## 2025-08-01 PROCEDURE — 1160F RVW MEDS BY RX/DR IN RCRD: CPT | Performed by: INTERNAL MEDICINE

## 2025-08-01 PROCEDURE — 3008F BODY MASS INDEX DOCD: CPT | Performed by: INTERNAL MEDICINE

## 2025-08-01 PROCEDURE — 99212 OFFICE O/P EST SF 10 MIN: CPT | Mod: 25

## 2025-08-01 PROCEDURE — 99214 OFFICE O/P EST MOD 30 MIN: CPT | Performed by: INTERNAL MEDICINE

## 2025-08-01 PROCEDURE — 93010 ELECTROCARDIOGRAM REPORT: CPT | Performed by: INTERNAL MEDICINE

## 2025-08-01 PROCEDURE — 1159F MED LIST DOCD IN RCRD: CPT | Performed by: INTERNAL MEDICINE

## 2025-08-01 PROCEDURE — 93005 ELECTROCARDIOGRAM TRACING: CPT | Performed by: INTERNAL MEDICINE

## 2025-08-01 NOTE — PROGRESS NOTES
"Chief Complaint:   No chief complaint on file.     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    Shortness of breath is about the same.  Denies any palpitations.  Still uses his smart watch and has not been alerted of any arrhythmias.    Does have some lower extremity swelling more so on the right ankle than the left.    Once again accompanied by his brother-in-law.    7-day ambulatory monitor January 2025: Predominantly sinus rhythm with frequent PACs.  Rare PVCs.  2 episodes of NSVT with the longest 13 beats and fastest 189 bpm.  PAC burden 34%.  180 occurrences of SVT with the fastest 158 bpm and longest 15 beats.  No significant pauses.  No evidence of atrial fibrillation or flutter.    CT angio with heart flow 7/5/2024:  LM: Distal calcified plaque without significant stenosis.  LAD: Wraps around the apex.  Proximal calcified plaque without significant stenosis.  Mid vessel with mixed plaque of 50%.  Distal LAD FFR 0.77.  D1 with proximal plaque without significant stenosis.  LCx nondominant.  Proximal vessel with no significant stenosis.  OM1 is small and normal.  OM 2 has proximal calcified plaque of 50 to 70% stenosis with mid FFR 0.67.  RCA dominant vessel.  Proximal calcified plaque without significant stenosis.  Mid vessel 50 to 70% stenosis however unable to obtain FFR due to motion artifact.  Stable subpleural density in the posterior lateral right lower lobe.    Limited echocardiogram 5/6/2024: EF 55%.  Hypokinesis of the basal to mid inferior septum and mid anterior septum.  Grade 1 diastolic dysfunction.    CT chest without contrast 5/5/2024: Peribronchial vascular groundglass infiltrates superimposed upon emphysematous changes.  Mild to moderate atherosclerotic calcifications " "noted of the thoracic aorta and moderate coronary artery calcifications noted.     Echocardiogram 1/10/2023: EF 60 to 65%.  Trace MR and trace to mild TR.     Treadmill stress echocardiogram 10/4/2022: Exercised for 2 minutes and 10 seconds on a standard Jose protocol achieving 95% of the age-predicted maximal heart rate.  EF 60% at baseline increasing to 80% at peak stress.       Allergies:  Patient has no known allergies.    Outpatient Medications:  Current Outpatient Medications   Medication Instructions    atorvastatin (LIPITOR) 40 mg, oral, Daily    budesonide (PULMICORT) 1 mg, 2 times daily RT    fluticasone (Flonase) 50 mcg/actuation nasal spray 2 sprays, Each Nostril, 2 times daily, Shake gently. Before first use, prime pump. After use, clean tip and replace cap.    ipratropium-albuteroL (Combivent Respimat)  mcg/actuation inhaler 1 puff, Every 4 hours PRN    ipratropium-albuteroL (Duo-Neb) 0.5-2.5 mg/3 mL nebulizer solution 3 mL, 4 times daily PRN    montelukast (SINGULAIR) 10 mg, oral, Nightly    nebivolol (BYSTOLIC) 2.5 mg, oral, Daily    ondansetron (ZOFRAN) 8 mg, oral, Every 8 hours PRN    oxygen (O2) 3 L/min, Continuous    rivaroxaban (XARELTO) 15 mg, oral, Daily with evening meal, Take with food.    tamsulosin (Flomax) 0.4 mg 24 hr capsule 2 capsules, Daily (0630)    triamcinolone (Kenalog) 0.1 % ointment 1 Application, 2 times daily PRN    Ventolin HFA 90 mcg/actuation inhaler 2 puffs, inhalation, Every 4 hours PRN       Last Recorded Vitals:  Visit Vitals  /50 (BP Location: Left arm, Patient Position: Sitting)   Pulse 70   Ht 1.753 m (5' 9\")   Wt 69.4 kg (153 lb)   SpO2 97%   BMI 22.59 kg/m²   Smoking Status Every Day   BSA 1.84 m²      LASTWT(3):   Wt Readings from Last 3 Encounters:   08/01/25 69.4 kg (153 lb)   05/20/25 68 kg (150 lb)   10/10/24 64.9 kg (143 lb)       Physical Exam:  In general: alert and in no acute distress.   HEENT: Carotid upstrokes normal with no bruits. JVP is " normal.   Pulmonary: Clear to auscultation bilaterally.  Cardiovascular: S1,S2, regular. No appreciable murmurs, rubs or gallops.   Lower extremities: Warm. 2+ distal pulses.  1+ bilateral (right greater than left) lower extremity edema.     Last Labs:  CBC -  Recent Labs     11/27/24  1442 06/08/24  0504 06/07/24 0139   WBC 8.8 12.0* 12.5*   HGB 11.7* 10.7* 13.0*   HCT 38.0* 35.2* 41.2    164 202   MCV 90 94 93       CMP -  Recent Labs     11/27/24  1442 06/08/24  0504 06/07/24 0139 05/27/24  0504 05/27/24  0036    143 137   < > 144   K 4.4 3.9 4.5   < > 3.7    102 96*   < > 109*   CO2 32 36* 32   < > 28   ANIONGAP 13 9* 14   < > 11   BUN 11 16 21   < > 18   CREATININE 0.82 0.88 0.98   < > 0.72   EGFR >90 >90 81   < > >90   MG  --  2.11 2.33  --  1.93    < > = values in this interval not displayed.     Recent Labs     11/27/24 1442 06/08/24  0504 06/07/24 0139 05/27/24  0504   ALBUMIN 4.5 3.4 4.1 3.6   ALKPHOS 65  --  46 44   ALT 14  --  26 30   AST 15  --  13 12   BILITOT 1.1  --  0.7 0.5       LIPID PANEL -   Recent Labs     11/27/24  1442 04/17/24  1130 11/17/22  1215 04/29/22  1537 06/18/21  1657   CHOL 112 160 128 151 181   LDLCALC 43 95  --   --   --    LDLF  --   --  64 81 114*   HDL 52.0 41.9 46.0 44.0 33.0*   TRIG 83 118 88 128 168*       Recent Labs     11/27/24  1442 06/07/24  0139 05/27/24  0036 05/24/24  2229 05/04/24  1433 04/17/24  1130   BNP  --  118* 107* 104*   < >  --    HGBA1C 6.3*  --   --   --   --  5.7*    < > = values in this interval not displayed.           Assessment/Plan   1) atrial fibrillation: No symptomatic recurrence of palpitations or detection of atrial fibrillation for the past several months.  Would recommend continued rate control with nebivolol and anticoagulation with Xarelto.  Still believe that Xarelto 15 mg daily is the appropriate dose.    Saw Dr. Myles in the past who had last recommended follow-up as needed.     2) dyslipidemia: LDL at goal  less than 70.  Continue atorvastatin 40 mg daily     3) CAD: CT coronary angiogram with heart flow demonstrated hemodynamically significant disease of the OM 2.  Although there was calcified plaque in the RCA it was not able to be evaluated from a hemodynamic standpoint due to motion artifact.    We had a very lengthy discussion about this.  I explained that as long as he has stable symptoms that there is little evidence that proceeding with catheterization and possible PCI would result in a reduction in mortality or MI.  It may make him feel better.  This was the crux of this discussion.  Clearly there is a component of shortness of breath that is due to his underlying severe COPD.  I explained that there may be a component that is related to obstructive coronary disease as well.  Because his symptoms have not changed significantly over the past several months (as long as he is not having a COPD exacerbation) then I believe it would be okay to continue to observe for now.    I have asked him to give our office a call if his shortness of breath seems to worsen or if he develops chest pain.  At that point I would discussed the idea of heart catheterization.    4) lower extremity edema: Has significantly improved off of steroids.  Still present.  Would like to hold off on any adjustments.  Talked about the use of loop diuretic such as furosemide but decided against it for now.    5) follow-up: 6 months or sooner if needed        Paul Ibrahim MD

## 2025-08-07 ENCOUNTER — APPOINTMENT (OUTPATIENT)
Dept: CARDIOLOGY | Facility: CLINIC | Age: 75
End: 2025-08-07
Payer: MEDICARE

## 2025-08-12 DIAGNOSIS — E78.5 DYSLIPIDEMIA: ICD-10-CM

## 2025-08-12 RX ORDER — ATORVASTATIN CALCIUM 40 MG/1
40 TABLET, FILM COATED ORAL DAILY
Qty: 90 TABLET | Refills: 3 | Status: SHIPPED | OUTPATIENT
Start: 2025-08-12 | End: 2026-08-12

## 2025-08-26 DIAGNOSIS — I48.91 ATRIAL FIBRILLATION WITH RVR (MULTI): ICD-10-CM

## 2025-08-26 RX ORDER — NEBIVOLOL 2.5 MG/1
2.5 TABLET ORAL DAILY
Qty: 30 TABLET | Refills: 11 | Status: SHIPPED | OUTPATIENT
Start: 2025-08-26 | End: 2025-08-28 | Stop reason: SDUPTHER

## 2025-08-28 DIAGNOSIS — E78.5 DYSLIPIDEMIA: ICD-10-CM

## 2025-08-28 DIAGNOSIS — I48.91 ATRIAL FIBRILLATION WITH RVR (MULTI): ICD-10-CM

## 2025-08-28 RX ORDER — ATORVASTATIN CALCIUM 40 MG/1
40 TABLET, FILM COATED ORAL DAILY
Qty: 90 TABLET | Refills: 3 | Status: SHIPPED | OUTPATIENT
Start: 2025-08-28 | End: 2026-08-28

## 2025-08-28 RX ORDER — NEBIVOLOL 2.5 MG/1
2.5 TABLET ORAL DAILY
Qty: 90 TABLET | Refills: 3 | Status: SHIPPED | OUTPATIENT
Start: 2025-08-28 | End: 2026-08-28

## 2025-11-17 ENCOUNTER — APPOINTMENT (OUTPATIENT)
Dept: PRIMARY CARE | Facility: CLINIC | Age: 75
End: 2025-11-17
Payer: MEDICARE